# Patient Record
Sex: FEMALE | Race: WHITE | Employment: UNEMPLOYED | URBAN - METROPOLITAN AREA
[De-identification: names, ages, dates, MRNs, and addresses within clinical notes are randomized per-mention and may not be internally consistent; named-entity substitution may affect disease eponyms.]

---

## 2017-08-08 ENCOUNTER — HOSPITAL ENCOUNTER (EMERGENCY)
Age: 47
Discharge: HOME | End: 2017-08-08
Attending: EMERGENCY MEDICINE
Payer: COMMERCIAL

## 2017-08-08 VITALS
WEIGHT: 187 LBS | HEIGHT: 63 IN | RESPIRATION RATE: 18 BRPM | OXYGEN SATURATION: 99 % | SYSTOLIC BLOOD PRESSURE: 111 MMHG | DIASTOLIC BLOOD PRESSURE: 72 MMHG | BODY MASS INDEX: 33.13 KG/M2 | HEART RATE: 72 BPM | TEMPERATURE: 98.2 F

## 2017-08-08 DIAGNOSIS — K52.9 GASTROENTERITIS: Primary | ICD-10-CM

## 2017-08-08 LAB
ABSOLUTE EOS #: 0.1 K/UL (ref 0–0.4)
ABSOLUTE LYMPH #: 2.4 K/UL (ref 1–4.8)
ABSOLUTE MONO #: 0.8 K/UL (ref 0.1–1.3)
ALBUMIN SERPL-MCNC: 4.2 G/DL (ref 3.5–5.2)
ALBUMIN/GLOBULIN RATIO: ABNORMAL (ref 1–2.5)
ALP BLD-CCNC: 63 U/L (ref 35–104)
ALT SERPL-CCNC: 17 U/L (ref 5–33)
ANION GAP SERPL CALCULATED.3IONS-SCNC: 14 MMOL/L (ref 9–17)
AST SERPL-CCNC: 17 U/L
BASOPHILS # BLD: 0 %
BASOPHILS ABSOLUTE: 0 K/UL (ref 0–0.2)
BILIRUB SERPL-MCNC: 0.28 MG/DL (ref 0.3–1.2)
BILIRUBIN DIRECT: <0.08 MG/DL
BILIRUBIN, INDIRECT: ABNORMAL MG/DL (ref 0–1)
BUN BLDV-MCNC: 13 MG/DL (ref 6–20)
BUN/CREAT BLD: NORMAL (ref 9–20)
CALCIUM SERPL-MCNC: 8.6 MG/DL (ref 8.6–10.4)
CHLORIDE BLD-SCNC: 100 MMOL/L (ref 98–107)
CO2: 23 MMOL/L (ref 20–31)
CREAT SERPL-MCNC: 0.75 MG/DL (ref 0.5–0.9)
DIFFERENTIAL TYPE: NORMAL
EOSINOPHILS RELATIVE PERCENT: 2 %
GFR AFRICAN AMERICAN: >60 ML/MIN
GFR NON-AFRICAN AMERICAN: >60 ML/MIN
GFR SERPL CREATININE-BSD FRML MDRD: NORMAL ML/MIN/{1.73_M2}
GFR SERPL CREATININE-BSD FRML MDRD: NORMAL ML/MIN/{1.73_M2}
GLOBULIN: ABNORMAL G/DL (ref 1.5–3.8)
GLUCOSE BLD-MCNC: 93 MG/DL (ref 70–99)
HCT VFR BLD CALC: 38.3 % (ref 36–46)
HEMOGLOBIN: 13 G/DL (ref 12–16)
LACTIC ACID: 0.6 MMOL/L (ref 0.5–2.2)
LIPASE: 45 U/L (ref 13–60)
LYMPHOCYTES # BLD: 31 %
MCH RBC QN AUTO: 30.6 PG (ref 26–34)
MCHC RBC AUTO-ENTMCNC: 33.9 G/DL (ref 31–37)
MCV RBC AUTO: 90.3 FL (ref 80–100)
MONOCYTES # BLD: 11 %
PDW BLD-RTO: 12.4 % (ref 11.5–14.9)
PLATELET # BLD: 192 K/UL (ref 150–450)
PLATELET ESTIMATE: NORMAL
PMV BLD AUTO: 9.5 FL (ref 6–12)
POTASSIUM SERPL-SCNC: 4 MMOL/L (ref 3.7–5.3)
RBC # BLD: 4.24 M/UL (ref 4–5.2)
RBC # BLD: NORMAL 10*6/UL
SEG NEUTROPHILS: 56 %
SEGMENTED NEUTROPHILS ABSOLUTE COUNT: 4.4 K/UL (ref 1.3–9.1)
SODIUM BLD-SCNC: 137 MMOL/L (ref 135–144)
TOTAL PROTEIN: 7 G/DL (ref 6.4–8.3)
WBC # BLD: 7.8 K/UL (ref 3.5–11)
WBC # BLD: NORMAL 10*3/UL

## 2017-08-08 PROCEDURE — 85025 COMPLETE CBC W/AUTO DIFF WBC: CPT

## 2017-08-08 PROCEDURE — 2580000003 HC RX 258: Performed by: EMERGENCY MEDICINE

## 2017-08-08 PROCEDURE — 96374 THER/PROPH/DIAG INJ IV PUSH: CPT

## 2017-08-08 PROCEDURE — 80048 BASIC METABOLIC PNL TOTAL CA: CPT

## 2017-08-08 PROCEDURE — 83605 ASSAY OF LACTIC ACID: CPT

## 2017-08-08 PROCEDURE — 99284 EMERGENCY DEPT VISIT MOD MDM: CPT

## 2017-08-08 PROCEDURE — 83690 ASSAY OF LIPASE: CPT

## 2017-08-08 PROCEDURE — 6360000002 HC RX W HCPCS: Performed by: EMERGENCY MEDICINE

## 2017-08-08 PROCEDURE — 36415 COLL VENOUS BLD VENIPUNCTURE: CPT

## 2017-08-08 PROCEDURE — 80076 HEPATIC FUNCTION PANEL: CPT

## 2017-08-08 RX ORDER — ONDANSETRON 2 MG/ML
4 INJECTION INTRAMUSCULAR; INTRAVENOUS ONCE
Status: COMPLETED | OUTPATIENT
Start: 2017-08-08 | End: 2017-08-08

## 2017-08-08 RX ORDER — DICYCLOMINE HYDROCHLORIDE 10 MG/1
10 CAPSULE ORAL EVERY 6 HOURS PRN
Qty: 20 CAPSULE | Refills: 0 | Status: SHIPPED | OUTPATIENT
Start: 2017-08-08 | End: 2017-09-24

## 2017-08-08 RX ORDER — METRONIDAZOLE 500 MG/1
500 TABLET ORAL 3 TIMES DAILY
Qty: 14 TABLET | Refills: 0 | Status: SHIPPED | OUTPATIENT
Start: 2017-08-08 | End: 2017-08-15

## 2017-08-08 RX ORDER — CIPROFLOXACIN 500 MG/1
500 TABLET, FILM COATED ORAL 2 TIMES DAILY
Qty: 14 TABLET | Refills: 0 | Status: SHIPPED | OUTPATIENT
Start: 2017-08-08 | End: 2017-08-15

## 2017-08-08 RX ORDER — ONDANSETRON HYDROCHLORIDE 8 MG/1
8 TABLET, FILM COATED ORAL EVERY 8 HOURS PRN
Qty: 20 TABLET | Refills: 0 | Status: SHIPPED | OUTPATIENT
Start: 2017-08-08 | End: 2017-09-24

## 2017-08-08 RX ORDER — 0.9 % SODIUM CHLORIDE 0.9 %
1000 INTRAVENOUS SOLUTION INTRAVENOUS ONCE
Status: COMPLETED | OUTPATIENT
Start: 2017-08-08 | End: 2017-08-08

## 2017-08-08 RX ADMIN — ONDANSETRON 4 MG: 2 INJECTION INTRAMUSCULAR; INTRAVENOUS at 21:39

## 2017-08-08 RX ADMIN — SODIUM CHLORIDE 1000 ML: 9 INJECTION, SOLUTION INTRAVENOUS at 21:39

## 2017-08-08 ASSESSMENT — PAIN SCALES - GENERAL: PAINLEVEL_OUTOF10: 7

## 2017-08-08 ASSESSMENT — PAIN DESCRIPTION - LOCATION: LOCATION: ABDOMEN

## 2017-08-08 ASSESSMENT — PAIN DESCRIPTION - DESCRIPTORS: DESCRIPTORS: SHARP;CRAMPING

## 2017-08-08 ASSESSMENT — PAIN DESCRIPTION - PAIN TYPE: TYPE: ACUTE PAIN

## 2017-08-08 ASSESSMENT — PAIN DESCRIPTION - ORIENTATION: ORIENTATION: MID

## 2017-08-09 ASSESSMENT — ENCOUNTER SYMPTOMS
SHORTNESS OF BREATH: 0
ABDOMINAL PAIN: 1
COUGH: 0
DIARRHEA: 1
VOMITING: 1
NAUSEA: 1

## 2017-09-24 ENCOUNTER — HOSPITAL ENCOUNTER (EMERGENCY)
Age: 47
Discharge: HOME | End: 2017-09-24
Attending: EMERGENCY MEDICINE
Payer: COMMERCIAL

## 2017-09-24 ENCOUNTER — APPOINTMENT (OUTPATIENT)
Dept: GENERAL RADIOLOGY | Age: 47
End: 2017-09-24
Payer: COMMERCIAL

## 2017-09-24 VITALS
SYSTOLIC BLOOD PRESSURE: 102 MMHG | DIASTOLIC BLOOD PRESSURE: 51 MMHG | HEART RATE: 73 BPM | RESPIRATION RATE: 14 BRPM | WEIGHT: 180 LBS | TEMPERATURE: 98.1 F | BODY MASS INDEX: 31.89 KG/M2 | OXYGEN SATURATION: 96 %

## 2017-09-24 DIAGNOSIS — J40 BRONCHITIS: Primary | ICD-10-CM

## 2017-09-24 LAB
ABSOLUTE EOS #: 0.2 K/UL (ref 0–0.4)
ABSOLUTE LYMPH #: 1.6 K/UL (ref 1–4.8)
ABSOLUTE MONO #: 0.8 K/UL (ref 0.1–1.3)
ANION GAP SERPL CALCULATED.3IONS-SCNC: 15 MMOL/L (ref 9–17)
BASOPHILS # BLD: 1 %
BASOPHILS ABSOLUTE: 0.1 K/UL (ref 0–0.2)
BNP INTERPRETATION: NORMAL
BUN BLDV-MCNC: 5 MG/DL (ref 6–20)
BUN/CREAT BLD: ABNORMAL (ref 9–20)
CALCIUM SERPL-MCNC: 8.7 MG/DL (ref 8.6–10.4)
CHLORIDE BLD-SCNC: 103 MMOL/L (ref 98–107)
CO2: 21 MMOL/L (ref 20–31)
CREAT SERPL-MCNC: 0.66 MG/DL (ref 0.5–0.9)
DIFFERENTIAL TYPE: NORMAL
EOSINOPHILS RELATIVE PERCENT: 3 %
GFR AFRICAN AMERICAN: >60 ML/MIN
GFR NON-AFRICAN AMERICAN: >60 ML/MIN
GFR SERPL CREATININE-BSD FRML MDRD: ABNORMAL ML/MIN/{1.73_M2}
GFR SERPL CREATININE-BSD FRML MDRD: ABNORMAL ML/MIN/{1.73_M2}
GLUCOSE BLD-MCNC: 85 MG/DL (ref 70–99)
HCT VFR BLD CALC: 40.6 % (ref 36–46)
HEMOGLOBIN: 13.8 G/DL (ref 12–16)
LYMPHOCYTES # BLD: 23 %
MCH RBC QN AUTO: 31.3 PG (ref 26–34)
MCHC RBC AUTO-ENTMCNC: 34.1 G/DL (ref 31–37)
MCV RBC AUTO: 91.8 FL (ref 80–100)
MONOCYTES # BLD: 11 %
MYOGLOBIN: 34 NG/ML (ref 25–58)
PDW BLD-RTO: 12.9 % (ref 11.5–14.9)
PLATELET # BLD: 185 K/UL (ref 150–450)
PLATELET ESTIMATE: NORMAL
PMV BLD AUTO: 8.9 FL (ref 6–12)
POTASSIUM SERPL-SCNC: 3.9 MMOL/L (ref 3.7–5.3)
PRO-BNP: 102 PG/ML
RBC # BLD: 4.42 M/UL (ref 4–5.2)
RBC # BLD: NORMAL 10*6/UL
SEG NEUTROPHILS: 62 %
SEGMENTED NEUTROPHILS ABSOLUTE COUNT: 4.3 K/UL (ref 1.3–9.1)
SODIUM BLD-SCNC: 139 MMOL/L (ref 135–144)
TROPONIN INTERP: NORMAL
TROPONIN T: <0.03 NG/ML
WBC # BLD: 6.9 K/UL (ref 3.5–11)
WBC # BLD: NORMAL 10*3/UL

## 2017-09-24 PROCEDURE — 71020 XR CHEST STANDARD TWO VW: CPT

## 2017-09-24 PROCEDURE — 36415 COLL VENOUS BLD VENIPUNCTURE: CPT

## 2017-09-24 PROCEDURE — 85025 COMPLETE CBC W/AUTO DIFF WBC: CPT

## 2017-09-24 PROCEDURE — 2580000003 HC RX 258: Performed by: EMERGENCY MEDICINE

## 2017-09-24 PROCEDURE — 99285 EMERGENCY DEPT VISIT HI MDM: CPT

## 2017-09-24 PROCEDURE — 96360 HYDRATION IV INFUSION INIT: CPT

## 2017-09-24 PROCEDURE — 93005 ELECTROCARDIOGRAM TRACING: CPT

## 2017-09-24 PROCEDURE — 6370000000 HC RX 637 (ALT 250 FOR IP): Performed by: EMERGENCY MEDICINE

## 2017-09-24 PROCEDURE — 80048 BASIC METABOLIC PNL TOTAL CA: CPT

## 2017-09-24 PROCEDURE — 96361 HYDRATE IV INFUSION ADD-ON: CPT

## 2017-09-24 PROCEDURE — 83874 ASSAY OF MYOGLOBIN: CPT

## 2017-09-24 PROCEDURE — 84484 ASSAY OF TROPONIN QUANT: CPT

## 2017-09-24 PROCEDURE — 83880 ASSAY OF NATRIURETIC PEPTIDE: CPT

## 2017-09-24 RX ORDER — BENZONATATE 100 MG/1
100 CAPSULE ORAL 3 TIMES DAILY PRN
Qty: 30 CAPSULE | Refills: 0 | Status: SHIPPED | OUTPATIENT
Start: 2017-09-24 | End: 2018-11-07 | Stop reason: ALTCHOICE

## 2017-09-24 RX ORDER — 0.9 % SODIUM CHLORIDE 0.9 %
500 INTRAVENOUS SOLUTION INTRAVENOUS ONCE
Status: COMPLETED | OUTPATIENT
Start: 2017-09-24 | End: 2017-09-24

## 2017-09-24 RX ORDER — BENZONATATE 100 MG/1
100 CAPSULE ORAL ONCE
Status: COMPLETED | OUTPATIENT
Start: 2017-09-24 | End: 2017-09-24

## 2017-09-24 RX ORDER — OXYMETAZOLINE HYDROCHLORIDE 0.05 G/100ML
2 SPRAY NASAL 2 TIMES DAILY
Qty: 1 BOTTLE | Refills: 0 | Status: SHIPPED | OUTPATIENT
Start: 2017-09-24 | End: 2017-09-27

## 2017-09-24 RX ADMIN — BENZONATATE 100 MG: 100 CAPSULE ORAL at 13:34

## 2017-09-24 RX ADMIN — SODIUM CHLORIDE 500 ML: 9 INJECTION, SOLUTION INTRAVENOUS at 13:39

## 2017-09-24 ASSESSMENT — ENCOUNTER SYMPTOMS
COLOR CHANGE: 0
SHORTNESS OF BREATH: 1
RHINORRHEA: 1
CONSTIPATION: 0
ABDOMINAL PAIN: 1
EYE REDNESS: 0
VOMITING: 0
EYE DISCHARGE: 0
CHEST TIGHTNESS: 1
BACK PAIN: 0
BLOOD IN STOOL: 0
SINUS PRESSURE: 0
NAUSEA: 0
COUGH: 1
DIARRHEA: 0
EYE PAIN: 0
WHEEZING: 0
FACIAL SWELLING: 0
SORE THROAT: 0
TROUBLE SWALLOWING: 0

## 2017-09-24 ASSESSMENT — PAIN SCALES - GENERAL: PAINLEVEL_OUTOF10: 8

## 2017-09-24 ASSESSMENT — PAIN DESCRIPTION - PAIN TYPE: TYPE: ACUTE PAIN

## 2017-09-24 ASSESSMENT — PAIN DESCRIPTION - LOCATION: LOCATION: CHEST

## 2017-09-24 ASSESSMENT — PAIN SCALES - WONG BAKER: WONGBAKER_NUMERICALRESPONSE: 8

## 2017-09-25 LAB
EKG ATRIAL RATE: 72 BPM
EKG P AXIS: 66 DEGREES
EKG P-R INTERVAL: 114 MS
EKG Q-T INTERVAL: 368 MS
EKG QRS DURATION: 90 MS
EKG QTC CALCULATION (BAZETT): 402 MS
EKG R AXIS: 79 DEGREES
EKG T AXIS: 49 DEGREES
EKG VENTRICULAR RATE: 72 BPM

## 2018-11-07 ENCOUNTER — OFFICE VISIT (OUTPATIENT)
Dept: FAMILY MEDICINE CLINIC | Age: 48
End: 2018-11-07
Payer: COMMERCIAL

## 2018-11-07 ENCOUNTER — HOSPITAL ENCOUNTER (OUTPATIENT)
Age: 48
Setting detail: SPECIMEN
Discharge: HOME | End: 2018-11-07
Payer: COMMERCIAL

## 2018-11-07 VITALS
OXYGEN SATURATION: 97 % | SYSTOLIC BLOOD PRESSURE: 114 MMHG | BODY MASS INDEX: 33.17 KG/M2 | DIASTOLIC BLOOD PRESSURE: 68 MMHG | HEIGHT: 63 IN | HEART RATE: 73 BPM | WEIGHT: 187.2 LBS

## 2018-11-07 DIAGNOSIS — R51.9 CHRONIC NONINTRACTABLE HEADACHE, UNSPECIFIED HEADACHE TYPE: ICD-10-CM

## 2018-11-07 DIAGNOSIS — R35.0 FREQUENT URINATION: ICD-10-CM

## 2018-11-07 DIAGNOSIS — F41.9 ANXIETY: ICD-10-CM

## 2018-11-07 DIAGNOSIS — G37.9 DEMYELINATING DISEASE OF CENTRAL NERVOUS SYSTEM (HCC): Primary | ICD-10-CM

## 2018-11-07 DIAGNOSIS — Z23 NEED FOR INFLUENZA VACCINATION: ICD-10-CM

## 2018-11-07 DIAGNOSIS — Z00.00 PREVENTATIVE HEALTH CARE: ICD-10-CM

## 2018-11-07 DIAGNOSIS — R82.90 ABNORMAL URINALYSIS: ICD-10-CM

## 2018-11-07 DIAGNOSIS — Z83.3 FAMILY HISTORY OF DIABETES MELLITUS: ICD-10-CM

## 2018-11-07 DIAGNOSIS — G89.29 CHRONIC NONINTRACTABLE HEADACHE, UNSPECIFIED HEADACHE TYPE: ICD-10-CM

## 2018-11-07 DIAGNOSIS — R31.29 OTHER MICROSCOPIC HEMATURIA: ICD-10-CM

## 2018-11-07 DIAGNOSIS — D50.9 IRON DEFICIENCY ANEMIA, UNSPECIFIED IRON DEFICIENCY ANEMIA TYPE: ICD-10-CM

## 2018-11-07 DIAGNOSIS — E55.9 VITAMIN D DEFICIENCY: ICD-10-CM

## 2018-11-07 DIAGNOSIS — Z11.4 ENCOUNTER FOR SCREENING FOR HIV: ICD-10-CM

## 2018-11-07 PROBLEM — D64.9 ANEMIA: Status: ACTIVE | Noted: 2018-11-07

## 2018-11-07 LAB
BILIRUBIN, POC: NEGATIVE
BLOOD URINE, POC: NORMAL
CLARITY, POC: CLEAR
COLOR, POC: NORMAL
GLUCOSE URINE, POC: NEGATIVE
KETONES, POC: NEGATIVE
LEUKOCYTE EST, POC: NEGATIVE
NITRITE, POC: NEGATIVE
PH, POC: 7
PROTEIN, POC: NEGATIVE
SPECIFIC GRAVITY, POC: >1.03
UROBILINOGEN, POC: 0.2

## 2018-11-07 PROCEDURE — G8427 DOCREV CUR MEDS BY ELIG CLIN: HCPCS | Performed by: NURSE PRACTITIONER

## 2018-11-07 PROCEDURE — 90471 IMMUNIZATION ADMIN: CPT | Performed by: NURSE PRACTITIONER

## 2018-11-07 PROCEDURE — 96160 PT-FOCUSED HLTH RISK ASSMT: CPT | Performed by: NURSE PRACTITIONER

## 2018-11-07 PROCEDURE — 81003 URINALYSIS AUTO W/O SCOPE: CPT | Performed by: NURSE PRACTITIONER

## 2018-11-07 PROCEDURE — 90686 IIV4 VACC NO PRSV 0.5 ML IM: CPT | Performed by: NURSE PRACTITIONER

## 2018-11-07 PROCEDURE — 99215 OFFICE O/P EST HI 40 MIN: CPT | Performed by: NURSE PRACTITIONER

## 2018-11-07 PROCEDURE — G8482 FLU IMMUNIZE ORDER/ADMIN: HCPCS | Performed by: NURSE PRACTITIONER

## 2018-11-07 PROCEDURE — 1036F TOBACCO NON-USER: CPT | Performed by: NURSE PRACTITIONER

## 2018-11-07 PROCEDURE — G8417 CALC BMI ABV UP PARAM F/U: HCPCS | Performed by: NURSE PRACTITIONER

## 2018-11-07 RX ORDER — LANOLIN ALCOHOL/MO/W.PET/CERES
325 CREAM (GRAM) TOPICAL
Qty: 30 TABLET | Refills: 5 | Status: SHIPPED | OUTPATIENT
Start: 2018-11-07 | End: 2019-12-17 | Stop reason: SDUPTHER

## 2018-11-07 RX ORDER — BUSPIRONE HYDROCHLORIDE 7.5 MG/1
7.5 TABLET ORAL 3 TIMES DAILY PRN
Qty: 90 TABLET | Refills: 0 | Status: SHIPPED | OUTPATIENT
Start: 2018-11-07 | End: 2019-12-17 | Stop reason: SDUPTHER

## 2018-11-07 ASSESSMENT — ENCOUNTER SYMPTOMS
SHORTNESS OF BREATH: 0
EYE DISCHARGE: 0
COUGH: 0
ABDOMINAL PAIN: 0
BLOOD IN STOOL: 0

## 2018-11-07 ASSESSMENT — PATIENT HEALTH QUESTIONNAIRE - PHQ9
6. FEELING BAD ABOUT YOURSELF - OR THAT YOU ARE A FAILURE OR HAVE LET YOURSELF OR YOUR FAMILY DOWN: 0
8. MOVING OR SPEAKING SO SLOWLY THAT OTHER PEOPLE COULD HAVE NOTICED. OR THE OPPOSITE, BEING SO FIGETY OR RESTLESS THAT YOU HAVE BEEN MOVING AROUND A LOT MORE THAN USUAL: 0
4. FEELING TIRED OR HAVING LITTLE ENERGY: 3
3. TROUBLE FALLING OR STAYING ASLEEP: 3
SUM OF ALL RESPONSES TO PHQ9 QUESTIONS 1 & 2: 6
2. FEELING DOWN, DEPRESSED OR HOPELESS: 3
9. THOUGHTS THAT YOU WOULD BE BETTER OFF DEAD, OR OF HURTING YOURSELF: 0
7. TROUBLE CONCENTRATING ON THINGS, SUCH AS READING THE NEWSPAPER OR WATCHING TELEVISION: 3
5. POOR APPETITE OR OVEREATING: 0
SUM OF ALL RESPONSES TO PHQ QUESTIONS 1-9: 15
1. LITTLE INTEREST OR PLEASURE IN DOING THINGS: 3
SUM OF ALL RESPONSES TO PHQ QUESTIONS 1-9: 15
10. IF YOU CHECKED OFF ANY PROBLEMS, HOW DIFFICULT HAVE THESE PROBLEMS MADE IT FOR YOU TO DO YOUR WORK, TAKE CARE OF THINGS AT HOME, OR GET ALONG WITH OTHER PEOPLE: 3

## 2018-11-07 ASSESSMENT — ANXIETY QUESTIONNAIRES
4. TROUBLE RELAXING: 3-NEARLY EVERY DAY
7. FEELING AFRAID AS IF SOMETHING AWFUL MIGHT HAPPEN: 3-NEARLY EVERY DAY
3. WORRYING TOO MUCH ABOUT DIFFERENT THINGS: 3-NEARLY EVERY DAY
6. BECOMING EASILY ANNOYED OR IRRITABLE: 3-NEARLY EVERY DAY
5. BEING SO RESTLESS THAT IT IS HARD TO SIT STILL: 1-SEVERAL DAYS
2. NOT BEING ABLE TO STOP OR CONTROL WORRYING: 3-NEARLY EVERY DAY
GAD7 TOTAL SCORE: 19
1. FEELING NERVOUS, ANXIOUS, OR ON EDGE: 3-NEARLY EVERY DAY

## 2018-11-07 NOTE — PROGRESS NOTES
mouth 3 times daily as needed (anxiety) 90 tablet 0    ferrous sulfate 325 (65 Fe) MG EC tablet Take 1 tablet by mouth daily (with breakfast) 30 tablet 5     No current facility-administered medications for this visit. Allergies   Allergen Reactions    Effexor [Venlafaxine Hcl]     Morphine     Pcn [Penicillins]          Subjective:      Review of Systems   Constitutional: Positive for fatigue. Negative for activity change, chills and fever. Eyes: Negative for discharge and visual disturbance. Respiratory: Negative for cough and shortness of breath. Cardiovascular: Negative for chest pain and leg swelling. Gastrointestinal: Negative for abdominal pain and blood in stool. Endocrine: Negative for cold intolerance and heat intolerance. Genitourinary: Positive for frequency. Negative for dysuria and flank pain. Musculoskeletal: Negative for joint swelling and myalgias. Skin: Negative for pallor and rash.        woiunds of face from anxiety scratching    Neurological: Positive for tremors (at night rarely ,when tired ) and headaches. Negative for dizziness. Psychiatric/Behavioral: Negative for hallucinations and suicidal ideas. The patient is nervous/anxious. Objective:     Physical Exam   Constitutional: She is oriented to person, place, and time. She appears well-developed and well-nourished. /68   Pulse 73   Ht 5' 3\" (1.6 m)   Wt 187 lb 3.2 oz (84.9 kg)   SpO2 97%   BMI 33.16 kg/m²      HENT:   Head: Normocephalic and atraumatic. Eyes: Conjunctivae and EOM are normal. No scleral icterus. Neck: Neck supple. Carotid bruit is not present. Cardiovascular: Normal rate, regular rhythm, normal heart sounds and intact distal pulses. Pulmonary/Chest: Effort normal and breath sounds normal. She has no wheezes. She has no rales. Abdominal: Soft. Bowel sounds are normal.   Musculoskeletal: Normal range of motion. She exhibits no edema.    Neurological: She is alert and oriented to person, place, and time. No essential tremors noted today on exam   Skin: Skin is warm and dry. Abrasions , superficial on  face from anxiety scratching . On chin and perioral area    Psychiatric: She has a normal mood and affect. Nursing note and vitals reviewed. /68   Pulse 73   Ht 5' 3\" (1.6 m)   Wt 187 lb 3.2 oz (84.9 kg)   SpO2 97%   BMI 33.16 kg/m²     CBC:   Lab Results   Component Value Date    WBC 6.9 09/24/2017    RBC 4.42 09/24/2017    HGB 13.8 09/24/2017    HCT 40.6 09/24/2017    MCV 91.8 09/24/2017    MCH 31.3 09/24/2017    MCHC 34.1 09/24/2017    RDW 12.9 09/24/2017     09/24/2017    MPV 8.9 09/24/2017     CMP:    Lab Results   Component Value Date     09/24/2017    K 3.9 09/24/2017     09/24/2017    CO2 21 09/24/2017    BUN 5 09/24/2017    CREATININE 0.66 09/24/2017    GFRAA >60 09/24/2017    LABGLOM >60 09/24/2017    GLUCOSE 85 09/24/2017    PROT 7.0 08/08/2017    LABALBU 4.2 08/08/2017    CALCIUM 8.7 09/24/2017    BILITOT 0.28 08/08/2017    ALKPHOS 63 08/08/2017    AST 17 08/08/2017    ALT 17 08/08/2017     No results found for: LABA1C  No radiation information found for this patient   Narrative   MRI brain with and without intravenous contrast, 7/3/2015       History: Demyelinating disease of the central nervous system.       Technique: Multiplanar multisequence MR imaging of the brain was performed before and after intravenous demonstration of 16 cc of Magnevist.       Findings: No evidence for shift of midline structures, mass effect or compression of the ventricles noted.       No acute intra-or extra-axial hemorrhage is seen. No abnormal intracranial fluid collections are identified.       Basal cisterns are patent.  Posterior fossa structures demonstrate no discrete mass.       Scattered foci of T2/FLAIR hyperintensity noted in the periventricular and subcortical white matter which essentially a nonspecific in imaging appearance however 5     Sig: Take 1 tablet by mouth daily (with breakfast)       All patient questions answered. Patient voiced understanding. Quality Measures    Body mass index is 33.16 kg/m². Elevated. Weight control planned discussed Healthy diet and regular exercise. BP: 114/68 Blood pressure is normal. Treatment plan consists of No treatment change needed. No results found for: LDLCALC, LDLCHOLESTEROL, LDLDIRECT (goal LDL reduction with dx if diabetes is 50% LDL reduction)      PHQ Scores 11/7/2018 1/27/2015   PHQ2 Score 6 6   PHQ9 Score 15 19     Interpretation of Total Score Depression Severity: 1-4 = Minimal depression, 5-9 = Mild depression, 10-14 = Moderate depression, 15-19 = Moderately severe depression, 20-27 = Severe depression    Return for and oralee. Orders Placed This Encounter   Procedures    Urine Culture     Order Specific Question:   Specify (ex-cath, midstream, cysto, etc)?      Answer:   midstream    INFLUENZA, QUADV, 3 YRS AND OLDER, IM, PF, PREFILL SYR OR SDV, 0.5ML (FLUZONE QUADV, PF)    HIV Screen     Standing Status:   Future     Standing Expiration Date:   11/7/2019    Lipid, Fasting     Standing Status:   Future     Standing Expiration Date:   11/7/2019    Vitamin D 25 Hydroxy     Standing Status:   Future     Standing Expiration Date:   11/7/2019    CBC Auto Differential     Standing Status:   Future     Standing Expiration Date:   11/7/2019    Hemoglobin A1C     Standing Status:   Future     Standing Expiration Date:   11/7/2019   Amado Angelucci, MD, [facility] [place]     Referral Priority:   Routine     Referral Type:   Consult for Advice and Opinion     Referral Reason:   Specialty Services Required     Referred to Provider:   Jose Martin MD     Requested Specialty:   Neurology     Number of Visits Requested:   Freda Heard MD, [facility] [place]*     Referral Priority:   Routine     Referral Type:   Consult for Advice and Opinion     Referral Reason:

## 2018-11-08 LAB
CULTURE: NORMAL
Lab: NORMAL
SPECIMEN DESCRIPTION: NORMAL
STATUS: NORMAL

## 2019-04-08 ENCOUNTER — HOSPITAL ENCOUNTER (OUTPATIENT)
Age: 49
Discharge: HOME | End: 2019-04-08
Payer: COMMERCIAL

## 2019-04-08 DIAGNOSIS — D50.9 IRON DEFICIENCY ANEMIA, UNSPECIFIED IRON DEFICIENCY ANEMIA TYPE: ICD-10-CM

## 2019-04-08 DIAGNOSIS — Z11.4 ENCOUNTER FOR SCREENING FOR HIV: ICD-10-CM

## 2019-04-08 DIAGNOSIS — E55.9 VITAMIN D DEFICIENCY: ICD-10-CM

## 2019-04-08 DIAGNOSIS — Z83.3 FAMILY HISTORY OF DIABETES MELLITUS: ICD-10-CM

## 2019-04-08 LAB
ABSOLUTE EOS #: 0.1 K/UL (ref 0–0.4)
ABSOLUTE IMMATURE GRANULOCYTE: ABNORMAL K/UL (ref 0–0.3)
ABSOLUTE LYMPH #: 2.5 K/UL (ref 1–4.8)
ABSOLUTE MONO #: 0.6 K/UL (ref 0.1–1.3)
BASOPHILS # BLD: 1 % (ref 0–2)
BASOPHILS ABSOLUTE: 0 K/UL (ref 0–0.2)
DIFFERENTIAL TYPE: ABNORMAL
EOSINOPHILS RELATIVE PERCENT: 2 % (ref 0–4)
ESTIMATED AVERAGE GLUCOSE: 114 MG/DL
HBA1C MFR BLD: 5.6 % (ref 4–6)
HCT VFR BLD CALC: 42 % (ref 36–46)
HEMOGLOBIN: 13.9 G/DL (ref 12–16)
HIV AG/AB: NONREACTIVE
IMMATURE GRANULOCYTES: ABNORMAL %
LYMPHOCYTES # BLD: 34 % (ref 24–44)
MCH RBC QN AUTO: 29.8 PG (ref 26–34)
MCHC RBC AUTO-ENTMCNC: 33.1 G/DL (ref 31–37)
MCV RBC AUTO: 90 FL (ref 80–100)
MONOCYTES # BLD: 8 % (ref 1–7)
NRBC AUTOMATED: ABNORMAL PER 100 WBC
PDW BLD-RTO: 12.8 % (ref 11.5–14.9)
PLATELET # BLD: 220 K/UL (ref 150–450)
PLATELET ESTIMATE: ABNORMAL
PMV BLD AUTO: 9.3 FL (ref 6–12)
RBC # BLD: 4.66 M/UL (ref 4–5.2)
RBC # BLD: ABNORMAL 10*6/UL
SEG NEUTROPHILS: 55 % (ref 36–66)
SEGMENTED NEUTROPHILS ABSOLUTE COUNT: 4.2 K/UL (ref 1.3–9.1)
VITAMIN D 25-HYDROXY: 22 NG/ML (ref 30–100)
WBC # BLD: 7.5 K/UL (ref 3.5–11)
WBC # BLD: ABNORMAL 10*3/UL

## 2019-04-08 PROCEDURE — 87389 HIV-1 AG W/HIV-1&-2 AB AG IA: CPT

## 2019-04-08 PROCEDURE — 82306 VITAMIN D 25 HYDROXY: CPT

## 2019-04-08 PROCEDURE — 83036 HEMOGLOBIN GLYCOSYLATED A1C: CPT

## 2019-04-08 PROCEDURE — 87086 URINE CULTURE/COLONY COUNT: CPT

## 2019-04-08 PROCEDURE — 85025 COMPLETE CBC W/AUTO DIFF WBC: CPT

## 2019-04-08 PROCEDURE — 36415 COLL VENOUS BLD VENIPUNCTURE: CPT

## 2019-04-09 ENCOUNTER — HOSPITAL ENCOUNTER (OUTPATIENT)
Age: 49
Discharge: HOME | End: 2019-04-09
Payer: COMMERCIAL

## 2019-04-09 DIAGNOSIS — Z00.00 PREVENTATIVE HEALTH CARE: ICD-10-CM

## 2019-04-09 LAB
CHOLESTEROL, FASTING: 198 MG/DL
CHOLESTEROL/HDL RATIO: 4
CULTURE: NORMAL
HDLC SERPL-MCNC: 50 MG/DL
LDL CHOLESTEROL: 125 MG/DL (ref 0–130)
Lab: NORMAL
SPECIMEN DESCRIPTION: NORMAL
TRIGLYCERIDE, FASTING: 114 MG/DL
VLDLC SERPL CALC-MCNC: NORMAL MG/DL (ref 1–30)

## 2019-04-09 PROCEDURE — 80061 LIPID PANEL: CPT

## 2019-04-09 PROCEDURE — 36415 COLL VENOUS BLD VENIPUNCTURE: CPT

## 2019-04-10 DIAGNOSIS — E55.9 VITAMIN D DEFICIENCY: Primary | ICD-10-CM

## 2019-04-10 RX ORDER — ERGOCALCIFEROL 1.25 MG/1
50000 CAPSULE ORAL WEEKLY
Qty: 12 CAPSULE | Refills: 0 | Status: SHIPPED | OUTPATIENT
Start: 2019-04-10 | End: 2020-09-15 | Stop reason: SDUPTHER

## 2019-04-18 ENCOUNTER — HOSPITAL ENCOUNTER (OUTPATIENT)
Age: 49
Setting detail: SPECIMEN
Discharge: HOME | End: 2019-04-18
Payer: COMMERCIAL

## 2019-04-18 ENCOUNTER — OFFICE VISIT (OUTPATIENT)
Dept: NEUROLOGY | Age: 49
End: 2019-04-18
Payer: COMMERCIAL

## 2019-04-18 VITALS
DIASTOLIC BLOOD PRESSURE: 78 MMHG | BODY MASS INDEX: 34.69 KG/M2 | WEIGHT: 195.8 LBS | SYSTOLIC BLOOD PRESSURE: 113 MMHG | HEIGHT: 63 IN | HEART RATE: 66 BPM

## 2019-04-18 DIAGNOSIS — G44.221 CHRONIC TENSION-TYPE HEADACHE, INTRACTABLE: ICD-10-CM

## 2019-04-18 DIAGNOSIS — R26.9 GAIT DIFFICULTY: ICD-10-CM

## 2019-04-18 DIAGNOSIS — G95.9 MYELOPATHY (HCC): ICD-10-CM

## 2019-04-18 DIAGNOSIS — R42 DIZZINESS: ICD-10-CM

## 2019-04-18 DIAGNOSIS — G95.9 MYELOPATHY (HCC): Primary | ICD-10-CM

## 2019-04-18 LAB
ANION GAP SERPL CALCULATED.3IONS-SCNC: 12 MMOL/L (ref 9–17)
BUN BLDV-MCNC: 9 MG/DL (ref 6–20)
BUN/CREAT BLD: ABNORMAL (ref 9–20)
CALCIUM SERPL-MCNC: 9.2 MG/DL (ref 8.6–10.4)
CHLORIDE BLD-SCNC: 108 MMOL/L (ref 98–107)
CO2: 24 MMOL/L (ref 20–31)
CREAT SERPL-MCNC: 0.87 MG/DL (ref 0.5–0.9)
GFR AFRICAN AMERICAN: >60 ML/MIN
GFR NON-AFRICAN AMERICAN: >60 ML/MIN
GFR SERPL CREATININE-BSD FRML MDRD: ABNORMAL ML/MIN/{1.73_M2}
GFR SERPL CREATININE-BSD FRML MDRD: ABNORMAL ML/MIN/{1.73_M2}
GLUCOSE BLD-MCNC: 96 MG/DL (ref 70–99)
POTASSIUM SERPL-SCNC: 4.2 MMOL/L (ref 3.7–5.3)
SODIUM BLD-SCNC: 144 MMOL/L (ref 135–144)

## 2019-04-18 PROCEDURE — 1036F TOBACCO NON-USER: CPT | Performed by: PSYCHIATRY & NEUROLOGY

## 2019-04-18 PROCEDURE — G8417 CALC BMI ABV UP PARAM F/U: HCPCS | Performed by: PSYCHIATRY & NEUROLOGY

## 2019-04-18 PROCEDURE — G8427 DOCREV CUR MEDS BY ELIG CLIN: HCPCS | Performed by: PSYCHIATRY & NEUROLOGY

## 2019-04-18 PROCEDURE — 99204 OFFICE O/P NEW MOD 45 MIN: CPT | Performed by: PSYCHIATRY & NEUROLOGY

## 2019-04-18 NOTE — PROGRESS NOTES
NEUROLOGY CONSULT  Patient Name:       Kentrell Bhat  :        1970  Clinic Visit Date:    2019    Dear Dr. Cortez Maradiaga MD     I had the opportunity to see your patient, Ms. Kentrell Bhat in neurology consultation today. As you know she  is a 50 y.o. right handed  female presented after prolonged absence of 4 year duration. She comes to the clinic stating that she went to [facility] in the past and they did not repeat MRI brain nor spinal fluid studies but they told her that she needs to be followed up for abnormal white matter changes but they started treating her with Botox injections. She stopped going as Botox injections made her to have facial twitching from Botox therapy. She was never given any diagnosis but she was told that she needs to be followed up for multiple sclerosis. She comes to the clinic stating that she has been having gait difficulties and tremors or shaking at night occurring every night affecting her sleep and weakness of lower extremities and right wrist is acting up and headaches never got better even after having had botox injection therapy. She does not want to go back to headache clinic and also does not want to have botox injection therapy. She also has been having constant aching pain \" feeling of getting big and about to explode\" lasting for several hours of the day and occur almost everyday; pain at level 7/10 severity. She has photophobia but denied phonophobia. She does have nausea all the time and denied vomiting. She was on Zofran in the past with help. She does have dizziness with lightheadedness and occasional room spinning sensations. She has been taking ibuprofen on frequent basis. She has taken Percocet and Vicodin but she is aware of addiction potential.   She has been having increasing gait difficulties and tripping on her own feet. She is very concerned about these problems and not able to function properly.   She has been age 48     Blood pressure 113/78, pulse 66, height 5' 3\" (1.6 m), weight 195 lb 12.8 oz (88.8 kg). GENERAL  Appears uncomfortable and exhausted due to lack of sleep but in no acute cardioresp distress   HEENT  NC/ AT   NECK  Supple and no bruits heard   MENTAL STATUS:  Alert, oriented, intact memory, no confusion, normal speech, normal language, no hallucination or delusion   CRANIAL NERVES: II     -      DEREK, Fundi reveal intact venous pulsations; Visual fields intact to confrontation  III,IV,VI -  EOMs full, no afferent defect, no                      MISBAH, no ptosis  V     -     Normal facial sensation  VII    -     Normal facial symmetry  VIII   -     Intact hearing  IX,X -     Symmetrical palate  XI    -     Symmetrical shoulder shrug  XII   -     Midline tongue, no atrophy    MOTOR FUNCTION:  significant for good strength of grade 5/5 in bilateral proximal and distal muscle groups of both upper and lower extremities with normal bulk, normal tone and no involuntary movements, no tremor   SENSORY FUNCTION:  Normal touch, normal pin, normal vibration, normal proprioception   CEREBELLAR FUNCTION:  Intact fine motor control over upper limbs   REFLEX FUNCTION:  Symmetric and brisk DTRs with equivocal plantars bilaterally   STATION and GAIT  Normal station, unable to walk on heels and toes and unable to do tandem gait           Diagnostic data reviewed with the patient:     MRI brain (w/wo) (11-3-2014): No acute or subacute ischemic insult seen. Multiple scattered white matter changes. MRI cervical spine (w/wo) (11-3-2014): with multilevel degenerative changes. There is no abnormal signal or enhancement within the cervical spine.      EEG (11-3-2014): normal.         Impression and Plan: Ms. Martín Benavides is a 50 y.o. female with   Chronic daily headaches; refractory to NSAIDs and failed Botox therapy  Progressive gait difficulties with dizziness and unsteadiness with features raising concern for myelopathy  Unprovoked do fatigue and increased affective disorder symptoms due to ongoing symptomatology  Hx of abnormal brain MRI with white matter changes    Will get MRI brain(w/wo) and MRI cervical spine (w/wo) in further evaluation  She has tried multiple medications and she is more concerned about diagnostic possibilities. Therefore will see her in the clinic once the above testing is done. Please note that portions of this note were completed with a voice recognition program.  Although every effort was made to ensure the accuracy of this  automated transcription, some errors in transcription may have occurred, occasionally words are mis-transcribed.

## 2019-04-18 NOTE — LETTER
[id number] [facility] Neurology Specialist  [address] [place] [id number]  Phone: [phone]  Fax: [phone]    Braden Leo MD        2019     MD Kaci Ricks [facility] [address]  [address] [facility] [address]    Patient: Hany Dixon  MR Number: [id number]  YOB: 1970  Date of Visit: 2019    Dear Dr. Greer Speak: Thank you for the request for consultation for Marylene Patient to me for the evaluation of Deena Evan  Below are the relevant portions of my assessment and plan of care. NEUROLOGY FOLLOW-UP  Patient Name:  Hany Dixon  :   1970  Clinic Visit Date: 2019        REVIEW OF SYSTEMS  Constitutional Weight changes: present, Fevers : absent, Fatigue: present; Any recent hospitalizations:  absent.    HEENT Ears: pain and ringing, Eyes: normal, Visual disturbance: present   Reespiratory Shortness of breath: present, Cough: absent   Cardivascular Chest pain: present, Leg swelling :absent   GI Constipation: absent, Diarrhea: absent, Swallowing change: absent    Urinary frequency: present, Urinary urgency: absent, Urinary incontinence: absent   Musculoskeletal Neck pain: present, Back pain: present, Stiffness: present, Muscle pain: present, Joint pain: present   Dermatological Hair loss: absent, Skin changes: absent   Neurological Memory loss: present, Confusion: present, Seizures: absent; Trouble walking or imbalance: present, Dizziness: present, Weakness: present, Numbness present, Tremor: present, Spasm: present, Speech difficulty: present, Headache: present, Light sensitivity: present   Psychiatric Anxiety: present, Depression  present, Suicidal ideations absent   Hematologic Abnormal bleeding: absent, Anemia: present, Clotting disorder: absent, Lymph gland changes: absent           NEUROLOGY CONSULT  Patient Name:       Hany Dixon  :        1970 Clinic Visit Date:    4/18/2019    Dear Dr. Yoon Sal:     I had the opportunity to see your patient, Ms. Jordyn Sarabia in neurology consultation today. As you know she  is a 50 y.o. right handed  female presented after prolonged absence of 4 year duration. She comes to the clinic stating that she went to [facility] in the past and they did not repeat MRI brain nor spinal fluid studies but they told her that she needs to be followed up for abnormal white matter changes but they started treating her with Botox injections. She stopped going as Botox injections made her to have facial twitching from Botox therapy. She was never given any diagnosis but she was told that she needs to be followed up for multiple sclerosis. She comes to the clinic stating that she has been having gait difficulties and tremors or shaking at night occurring every night affecting her sleep and weakness of lower extremities and right wrist is acting up and headaches never got better even after having had botox injection therapy. She does not want to go back to headache clinic and also does not want to have botox injection therapy. She also has been having constant aching pain \" feeling of getting big and about to explode\" lasting for several hours of the day and occur almost everyday; pain at level 7/10 severity. She has photophobia but denied phonophobia. She does have nausea all the time and denied vomiting. She was on Zofran in the past with help. She does have dizziness with lightheadedness and occasional room spinning sensations. She has been taking ibuprofen on frequent basis. She has taken Percocet and Vicodin but she is aware of addiction potential.   She has been having increasing gait difficulties and tripping on her own feet. She is very concerned about these problems and not able to function properly.   She has been bothered by fatigue waking up with fatigue even after having good night sleep. She also has been having neck pain and radiating pain into the arms and also with occasional numbness in arms and also at times; she has been having loss of fine motor control in hands or clumsiness in the hands. She has been having weakness in lower extremities. Denies bladder dysfunction. But admits to having electric shock-like sensation in the neck, radiating down the spine or into the arms by bending the neck forward. Review of systems done by staff reviewed and pertinent positives include headaches, dizziness and gait difficulties as stated above. Current Outpatient Medications on File Prior to Visit   Medication Sig Dispense Refill    vitamin D (ERGOCALCIFEROL) 35795 units CAPS capsule Take 1 capsule by mouth once a week 12 capsule 0    ferrous sulfate 325 (65 Fe) MG EC tablet Take 1 tablet by mouth daily (with breakfast) 30 tablet 5     No current facility-administered medications on file prior to visit. Allergies: Florentin Boone is allergic to effexor [venlafaxine hcl]; morphine; and pcn [penicillins]. Past Medical History:   Diagnosis Date    Anxiety     unison ,on meds in past     CAD (coronary artery disease) 5/5/2014    Chronic back pain     Depression     post partum and childhood    Fibromyalgia     Headache(784.0)     High cholesterol     Palpitations        Past Surgical History:   Procedure Laterality Date    APPENDECTOMY      SPINE SURGERY      TUBAL LIGATION       Social History: Florentin Boone  reports that she has never smoked. She has never used smokeless tobacco. She reports that she drinks alcohol. She reports that she does not use drugs.     Family History   Problem Relation Age of Onset    Diabetes Mother     Cancer Mother         breast  age 76 cervical     Alcohol Abuse Father     Coronary Art Dis Sister     Cervical Cancer Sister    [facility] Breast Cancer Sister         age 48 Blood pressure 113/78, pulse 66, height 5' 3\" (1.6 m), weight 195 lb 12.8 oz (88.8 kg). GENERAL  Appears uncomfortable and exhausted due to lack of sleep but in no acute cardioresp distress   HEENT  NC/ AT   NECK  Supple and no bruits heard   MENTAL STATUS:  Alert, oriented, intact memory, no confusion, normal speech, normal language, no hallucination or delusion   CRANIAL NERVES: II     -      DEREK, Fundi reveal intact venous pulsations; Visual fields intact to confrontation  III,IV,VI -  EOMs full, no afferent defect, no                      MISBAH, no ptosis  V     -     Normal facial sensation  VII    -     Normal facial symmetry  VIII   -     Intact hearing  IX,X -     Symmetrical palate  XI    -     Symmetrical shoulder shrug  XII   -     Midline tongue, no atrophy    MOTOR FUNCTION:  significant for good strength of grade 5/5 in bilateral proximal and distal muscle groups of both upper and lower extremities with normal bulk, normal tone and no involuntary movements, no tremor   SENSORY FUNCTION:  Normal touch, normal pin, normal vibration, normal proprioception   CEREBELLAR FUNCTION:  Intact fine motor control over upper limbs   REFLEX FUNCTION:  Symmetric and brisk DTRs with equivocal plantars bilaterally   STATION and GAIT  Normal station, unable to walk on heels and toes and unable to do tandem gait           Diagnostic data reviewed with the patient:     MRI brain (w/wo) (11-3-2014): No acute or subacute ischemic insult seen. Multiple scattered white matter changes. MRI cervical spine (w/wo) (11-3-2014): with multilevel degenerative changes. There is no abnormal signal or enhancement within the cervical spine.      EEG (11-3-2014): normal.         Impression and Plan: Ms. Anjel Alvarado is a 50 y.o. female with   Chronic daily headaches; refractory to NSAIDs and failed Botox therapy  Progressive gait difficulties with dizziness and unsteadiness with features raising concern for myelopathy Unprovoked do fatigue and increased affective disorder symptoms due to ongoing symptomatology  Hx of abnormal brain MRI with white matter changes    Will get MRI brain(w/wo) and MRI cervical spine (w/wo) in further evaluation  She has tried multiple medications and she is more concerned about diagnostic possibilities. Therefore will see her in the clinic once the above testing is done. Please note that portions of this note were completed with a voice recognition program.  Although every effort was made to ensure the accuracy of this  automated transcription, some errors in transcription may have occurred, occasionally words are mis-transcribed. If you have questions, please do not hesitate to call me. I look forward to following Kim along with you.     Sincerely,        Allyson Muhammad MD

## 2019-04-18 NOTE — PROGRESS NOTES
NEUROLOGY FOLLOW-UP  Patient Name:  Em Potts  :   1970  Clinic Visit Date: 2019        REVIEW OF SYSTEMS  Constitutional Weight changes: present, Fevers : absent, Fatigue: present; Any recent hospitalizations:  absent.    HEENT Ears: pain and ringing, Eyes: normal, Visual disturbance: present   Reespiratory Shortness of breath: present, Cough: absent   Cardivascular Chest pain: present, Leg swelling :absent   GI Constipation: absent, Diarrhea: absent, Swallowing change: absent    Urinary frequency: present, Urinary urgency: absent, Urinary incontinence: absent   Musculoskeletal Neck pain: present, Back pain: present, Stiffness: present, Muscle pain: present, Joint pain: present   Dermatological Hair loss: absent, Skin changes: absent   Neurological Memory loss: present, Confusion: present, Seizures: absent; Trouble walking or imbalance: present, Dizziness: present, Weakness: present, Numbness present, Tremor: present, Spasm: present, Speech difficulty: present, Headache: present, Light sensitivity: present   Psychiatric Anxiety: present, Depression  present, Suicidal ideations absent   Hematologic Abnormal bleeding: absent, Anemia: present, Clotting disorder: absent, Lymph gland changes: absent

## 2019-04-26 ENCOUNTER — HOSPITAL ENCOUNTER (OUTPATIENT)
Age: 49
Setting detail: SPECIMEN
Discharge: HOME | End: 2019-04-26
Payer: COMMERCIAL

## 2019-04-26 ENCOUNTER — OFFICE VISIT (OUTPATIENT)
Dept: UROLOGY | Age: 49
End: 2019-04-26
Payer: COMMERCIAL

## 2019-04-26 VITALS
HEART RATE: 76 BPM | HEIGHT: 63 IN | SYSTOLIC BLOOD PRESSURE: 103 MMHG | BODY MASS INDEX: 34.55 KG/M2 | DIASTOLIC BLOOD PRESSURE: 69 MMHG | WEIGHT: 195 LBS

## 2019-04-26 DIAGNOSIS — R31.21 ASYMPTOMATIC MICROSCOPIC HEMATURIA: ICD-10-CM

## 2019-04-26 DIAGNOSIS — R31.9 HEMATURIA, UNSPECIFIED TYPE: ICD-10-CM

## 2019-04-26 DIAGNOSIS — R31.9 HEMATURIA, UNSPECIFIED TYPE: Primary | ICD-10-CM

## 2019-04-26 LAB
BILIRUBIN, POC: NEGATIVE
BLOOD URINE, POC: ABNORMAL
CLARITY, POC: CLEAR
COLOR, POC: YELLOW
GLUCOSE URINE, POC: NEGATIVE
KETONES, POC: NEGATIVE
LEUKOCYTE EST, POC: NEGATIVE
NITRITE, POC: NEGATIVE
PH, POC: 5.5
PROTEIN, POC: NEGATIVE
SPECIFIC GRAVITY, POC: 1.02
UROBILINOGEN, POC: 0.2

## 2019-04-26 PROCEDURE — 81003 URINALYSIS AUTO W/O SCOPE: CPT | Performed by: UROLOGY

## 2019-04-26 PROCEDURE — G8427 DOCREV CUR MEDS BY ELIG CLIN: HCPCS | Performed by: UROLOGY

## 2019-04-26 PROCEDURE — 99203 OFFICE O/P NEW LOW 30 MIN: CPT | Performed by: UROLOGY

## 2019-04-26 PROCEDURE — 1036F TOBACCO NON-USER: CPT | Performed by: UROLOGY

## 2019-04-26 PROCEDURE — G8417 CALC BMI ABV UP PARAM F/U: HCPCS | Performed by: UROLOGY

## 2019-04-26 NOTE — PROGRESS NOTES
[facility] [place], Bennett Naik Tyler. [facility] [address] [facility] Consultation / New Patient Visit    Patient:  Jennie Kim  YOB: 1970  Date: 4/26/2019  Consult requested from Alli Velásquez MD   for purpose of evaluation of AMH. HISTORY OF PRESENT ILLNESS:   The patient is a 50 y.o. female who presents today for follow-up for the following problem(s): AMH  Overall the problem(s) : are improving. Associated Symptoms: No dysuria, gross hematuria. Pain Severity:   0/10    Hematuria:   History of AMH  Previous work up many years ago, unremarkable  Asymptomatic    Non smoker  No blood thinning meds   history: Kidney stones   family history: none  Environmental exposure: none  Hematuria,  has resolved at this time  Imaging: none    Summary of old records: N/A  (Patient's old records, notes and chart reviewed and summarized above.)    Urinalysis today:  No results found for this visit on 04/26/19.     Last BUN and creatinine:  Lab Results   Component Value Date    BUN 9 04/18/2019     Lab Results   Component Value Date    CREATININE 0.87 04/18/2019       Imaging Reviewed during this Office Visit:   (results were independently reviewed by physician and radiology report verified)    PAST MEDICAL, FAMILY AND SOCIAL HISTORY:  Past Medical History:   Diagnosis Date    Anxiety     unison ,on meds in past     CAD (coronary artery disease) 5/5/2014    Chronic back pain     Depression     post partum and childhood    Fibromyalgia     Headache(784.0)     High cholesterol     Palpitations      Past Surgical History:   Procedure Laterality Date    APPENDECTOMY      SPINE SURGERY      TUBAL LIGATION       Family History   Problem Relation Age of Onset    Diabetes Mother     Cancer Mother         breast  age 76 cervical     Alcohol Abuse Father     Coronary Art Dis Sister     Cervical Cancer Sister     Breast Cancer Sister         age 48     Outpatient Medications Marked as Taking for the 4/26/19 encounter (Office Visit) with Justin Ross MD   Medication Sig Dispense Refill    vitamin D (ERGOCALCIFEROL) 76719 units CAPS capsule Take 1 capsule by mouth once a week 12 capsule 0    ferrous sulfate 325 (65 Fe) MG EC tablet Take 1 tablet by mouth daily (with breakfast) 30 tablet 5       Effexor [venlafaxine hcl]; Morphine; and Pcn [penicillins]  Social History     Tobacco Use   Smoking Status Never Smoker   Smokeless Tobacco Never Used       Social History     Substance and Sexual Activity   Alcohol Use Yes    Alcohol/week: 0.0 oz    Comment: rarely       REVIEW OF SYSTEMS:  Constitutional: negative  Eyes: negative  Respiratory: negative  Cardiovascular: negative  Gastrointestinal: Positive for abdominal pain, nausea  negative  Genitourinary: positive for difficulty urinating, flank pain, blood in urine, pelvic pain  Musculoskeletal: positive for  Back pain, muscle aches   Skin: negative   Neurological: negative  Hematological/Lymphatic: negative  Psychological: negative    Physical Exam:    This a 50 y.o. female    There were no vitals filed for this visit. Constitutional: Patient in no acute distress   Neuro: alert and oriented to person place and time. Psych: Mood and affect normal.  Head: atraumatic normocephalic  Eyes: EOMi  HEENT: neck supple, trachea midline  Lungs: Respiratory effort normal  Cardiovascular:  Normal peripheral pulses  Abdomen: Soft, non-tender, non-distended, No CVA  Bladder: non-tender and not distended. FROMx4, no cyanosis clubbing edema  Skin: warm and dry     Assessment and Plan      1. Hematuria, unspecified type    2. Asymptomatic microscopic hematuria           Plan:      No follow-ups on file.   AMH workup  CTU  Cystoscopy  UA UCx

## 2019-04-26 NOTE — PATIENT INSTRUCTIONS
Patient Education        Blood in the Urine: Care Instructions  Your Care Instructions    Blood in the urine, or hematuria, may make the urine look red, brown, or pink. There may be blood every time you urinate or just from time to time. You cannot always see blood in the urine, but it will show up in a urine test.  Blood in the urine may be serious. It should always be checked by a doctor. Your doctor may recommend more tests, including an X-ray, a CT scan, or a cystoscopy (which lets a doctor look inside the urethra and bladder). Blood in the urine can be a sign of another problem. Common causes are bladder infections and kidney stones. An injury to your groin or your genital area can also cause bleeding in the urinary tract. Very hard exercise--such as running a marathon--can cause blood in the urine. Blood in the urine can also be a sign of kidney disease or cancer in the bladder or kidney. Many cases of blood in the urine are caused by a harmless condition that runs in families. This is called benign familial hematuria. It does not need any treatment. Sometimes your urine may look red or brown even though it does not contain blood. For example, not getting enough fluids (dehydration), taking certain medicines, or having a liver problem can change the color of your urine. Eating foods such as beets, rhubarb, or blackberries or foods with red food coloring can make your urine look red or pink. Follow-up care is a key part of your treatment and safety. Be sure to make and go to all appointments, and call your doctor if you are having problems. It's also a good idea to know your test results and keep a list of the medicines you take. When should you call for help? Call your doctor now or seek immediate medical care if:    · You have symptoms of a urinary infection. For example:  ? You have pus in your urine. ? You have pain in your back just below your rib cage. This is called flank pain. ?  You have a fever, chills, or body aches. ? It hurts to urinate. ? You have groin or belly pain.     · You have more blood in your urine.    Watch closely for changes in your health, and be sure to contact your doctor if:    · You have new urination problems.     · You do not get better as expected. Where can you learn more? Go to https://Quarticspepiceweb.RallyOn. org and sign in to your [employer] account. Enter H258 in the 60mo box to learn more about \"Blood in the Urine: Care Instructions. \"     If you do not have an account, please click on the \"Sign Up Now\" link. Current as of: March 20, 2018  Content Version: 11.9  © 5428-6194 [employer]. Care instructions adapted under license by [facility] ([facility]). If you have questions about a medical condition or this instruction, always ask your healthcare professional. [address] any warranty or liability for your use of this information.

## 2019-04-27 LAB
CULTURE: NORMAL
Lab: NORMAL
SPECIMEN DESCRIPTION: NORMAL

## 2019-05-02 ENCOUNTER — HOSPITAL ENCOUNTER (OUTPATIENT)
Dept: MRI IMAGING | Age: 49
Discharge: HOME | End: 2019-05-04
Payer: COMMERCIAL

## 2019-05-02 DIAGNOSIS — G95.9 MYELOPATHY (HCC): ICD-10-CM

## 2019-05-02 DIAGNOSIS — G44.221 CHRONIC TENSION-TYPE HEADACHE, INTRACTABLE: ICD-10-CM

## 2019-05-02 DIAGNOSIS — R42 DIZZINESS: ICD-10-CM

## 2019-05-02 DIAGNOSIS — R26.9 GAIT DIFFICULTY: ICD-10-CM

## 2019-05-02 PROCEDURE — A9576 INJ PROHANCE MULTIPACK: HCPCS | Performed by: PSYCHIATRY & NEUROLOGY

## 2019-05-02 PROCEDURE — 72156 MRI NECK SPINE W/O & W/DYE: CPT

## 2019-05-02 PROCEDURE — 70553 MRI BRAIN STEM W/O & W/DYE: CPT

## 2019-05-02 PROCEDURE — 6360000004 HC RX CONTRAST MEDICATION: Performed by: PSYCHIATRY & NEUROLOGY

## 2019-05-02 RX ADMIN — GADOTERIDOL 17 ML: 279.3 INJECTION, SOLUTION INTRAVENOUS at 15:50

## 2019-05-03 ENCOUNTER — HOSPITAL ENCOUNTER (EMERGENCY)
Age: 49
Discharge: HOME | End: 2019-05-03
Attending: EMERGENCY MEDICINE
Payer: COMMERCIAL

## 2019-05-03 ENCOUNTER — HOSPITAL ENCOUNTER (OUTPATIENT)
Dept: CT IMAGING | Age: 49
Discharge: HOME | End: 2019-05-05
Payer: COMMERCIAL

## 2019-05-03 VITALS
RESPIRATION RATE: 14 BRPM | DIASTOLIC BLOOD PRESSURE: 67 MMHG | WEIGHT: 195 LBS | HEART RATE: 76 BPM | OXYGEN SATURATION: 98 % | SYSTOLIC BLOOD PRESSURE: 118 MMHG | TEMPERATURE: 97.3 F | BODY MASS INDEX: 34.54 KG/M2

## 2019-05-03 DIAGNOSIS — R31.21 ASYMPTOMATIC MICROSCOPIC HEMATURIA: ICD-10-CM

## 2019-05-03 DIAGNOSIS — M54.50 CHRONIC BILATERAL LOW BACK PAIN WITHOUT SCIATICA: Primary | ICD-10-CM

## 2019-05-03 DIAGNOSIS — G89.29 CHRONIC BILATERAL LOW BACK PAIN WITHOUT SCIATICA: Primary | ICD-10-CM

## 2019-05-03 DIAGNOSIS — R31.9 HEMATURIA, UNSPECIFIED TYPE: ICD-10-CM

## 2019-05-03 LAB
-: ABNORMAL
ABSOLUTE EOS #: 0.12 K/UL (ref 0–0.44)
ABSOLUTE IMMATURE GRANULOCYTE: 0.03 K/UL (ref 0–0.3)
ABSOLUTE LYMPH #: 2.6 K/UL (ref 1.1–3.7)
ABSOLUTE MONO #: 0.91 K/UL (ref 0.1–1.2)
ALBUMIN SERPL-MCNC: 3.9 G/DL (ref 3.5–5.2)
ALBUMIN/GLOBULIN RATIO: 1.3 (ref 1–2.5)
ALP BLD-CCNC: 78 U/L (ref 35–104)
ALT SERPL-CCNC: 18 U/L (ref 5–33)
AMORPHOUS: ABNORMAL
ANION GAP SERPL CALCULATED.3IONS-SCNC: 11 MMOL/L (ref 9–17)
AST SERPL-CCNC: 14 U/L
BACTERIA: ABNORMAL
BASOPHILS # BLD: 0 % (ref 0–2)
BASOPHILS ABSOLUTE: 0.04 K/UL (ref 0–0.2)
BILIRUB SERPL-MCNC: 0.33 MG/DL (ref 0.3–1.2)
BILIRUBIN URINE: NEGATIVE
BUN BLDV-MCNC: 9 MG/DL (ref 6–20)
BUN/CREAT BLD: NORMAL (ref 9–20)
CALCIUM SERPL-MCNC: 8.9 MG/DL (ref 8.6–10.4)
CASTS UA: ABNORMAL /LPF (ref 0–8)
CHLORIDE BLD-SCNC: 100 MMOL/L (ref 98–107)
CO2: 24 MMOL/L (ref 20–31)
COLOR: YELLOW
CREAT SERPL-MCNC: 0.88 MG/DL (ref 0.5–0.9)
CRYSTALS, UA: ABNORMAL /HPF
DIFFERENTIAL TYPE: ABNORMAL
EOSINOPHILS RELATIVE PERCENT: 1 % (ref 1–4)
EPITHELIAL CELLS UA: ABNORMAL /HPF (ref 0–5)
GFR AFRICAN AMERICAN: >60 ML/MIN
GFR NON-AFRICAN AMERICAN: >60 ML/MIN
GFR SERPL CREATININE-BSD FRML MDRD: NORMAL ML/MIN/{1.73_M2}
GFR SERPL CREATININE-BSD FRML MDRD: NORMAL ML/MIN/{1.73_M2}
GLUCOSE BLD-MCNC: 83 MG/DL (ref 70–99)
GLUCOSE URINE: NEGATIVE
HCT VFR BLD CALC: 39.5 % (ref 36.3–47.1)
HEMOGLOBIN: 12.9 G/DL (ref 11.9–15.1)
IMMATURE GRANULOCYTES: 0 %
KETONES, URINE: NEGATIVE
LEUKOCYTE ESTERASE, URINE: NEGATIVE
LIPASE: 50 U/L (ref 13–60)
LYMPHOCYTES # BLD: 24 % (ref 24–43)
MCH RBC QN AUTO: 30.1 PG (ref 25.2–33.5)
MCHC RBC AUTO-ENTMCNC: 32.7 G/DL (ref 28.4–34.8)
MCV RBC AUTO: 92.1 FL (ref 82.6–102.9)
MONOCYTES # BLD: 8 % (ref 3–12)
MUCUS: ABNORMAL
NITRITE, URINE: NEGATIVE
NRBC AUTOMATED: 0 PER 100 WBC
OTHER OBSERVATIONS UA: ABNORMAL
PDW BLD-RTO: 12.1 % (ref 11.8–14.4)
PH UA: 6 (ref 5–8)
PLATELET # BLD: 180 K/UL (ref 138–453)
PLATELET ESTIMATE: ABNORMAL
PMV BLD AUTO: 10.6 FL (ref 8.1–13.5)
POTASSIUM SERPL-SCNC: 4 MMOL/L (ref 3.7–5.3)
PROTEIN UA: NEGATIVE
RBC # BLD: 4.29 M/UL (ref 3.95–5.11)
RBC # BLD: ABNORMAL 10*6/UL
RBC UA: ABNORMAL /HPF (ref 0–4)
RENAL EPITHELIAL, UA: ABNORMAL /HPF
SEG NEUTROPHILS: 67 % (ref 36–65)
SEGMENTED NEUTROPHILS ABSOLUTE COUNT: 7.13 K/UL (ref 1.5–8.1)
SODIUM BLD-SCNC: 135 MMOL/L (ref 135–144)
SPECIFIC GRAVITY UA: 1.02 (ref 1–1.03)
TOTAL PROTEIN: 6.8 G/DL (ref 6.4–8.3)
TRICHOMONAS: ABNORMAL
TURBIDITY: CLEAR
URINE HGB: ABNORMAL
UROBILINOGEN, URINE: NORMAL
WBC # BLD: 10.8 K/UL (ref 3.5–11.3)
WBC # BLD: ABNORMAL 10*3/UL
WBC UA: ABNORMAL /HPF (ref 0–5)
YEAST: ABNORMAL

## 2019-05-03 PROCEDURE — 6360000002 HC RX W HCPCS: Performed by: PHYSICIAN ASSISTANT

## 2019-05-03 PROCEDURE — 6360000004 HC RX CONTRAST MEDICATION: Performed by: UROLOGY

## 2019-05-03 PROCEDURE — 96374 THER/PROPH/DIAG INJ IV PUSH: CPT

## 2019-05-03 PROCEDURE — 81001 URINALYSIS AUTO W/SCOPE: CPT

## 2019-05-03 PROCEDURE — 80053 COMPREHEN METABOLIC PANEL: CPT

## 2019-05-03 PROCEDURE — 74178 CT ABD&PLV WO CNTR FLWD CNTR: CPT

## 2019-05-03 PROCEDURE — 99283 EMERGENCY DEPT VISIT LOW MDM: CPT

## 2019-05-03 PROCEDURE — 85025 COMPLETE CBC W/AUTO DIFF WBC: CPT

## 2019-05-03 PROCEDURE — 83690 ASSAY OF LIPASE: CPT

## 2019-05-03 RX ORDER — OXYCODONE HYDROCHLORIDE AND ACETAMINOPHEN 5; 325 MG/1; MG/1
1 TABLET ORAL EVERY 6 HOURS PRN
Qty: 6 TABLET | Refills: 0 | Status: SHIPPED | OUTPATIENT
Start: 2019-05-03 | End: 2019-05-05

## 2019-05-03 RX ORDER — KETOROLAC TROMETHAMINE 15 MG/ML
15 INJECTION, SOLUTION INTRAMUSCULAR; INTRAVENOUS ONCE
Status: COMPLETED | OUTPATIENT
Start: 2019-05-03 | End: 2019-05-03

## 2019-05-03 RX ADMIN — KETOROLAC TROMETHAMINE 15 MG: 15 INJECTION, SOLUTION INTRAMUSCULAR; INTRAVENOUS at 17:48

## 2019-05-03 RX ADMIN — IOHEXOL 130 ML: 350 INJECTION, SOLUTION INTRAVENOUS at 16:51

## 2019-05-03 ASSESSMENT — ENCOUNTER SYMPTOMS
VOMITING: 0
SHORTNESS OF BREATH: 0
COLOR CHANGE: 0
NAUSEA: 0
BACK PAIN: 1
SORE THROAT: 0
DIARRHEA: 0
ABDOMINAL PAIN: 0
COUGH: 0

## 2019-05-03 ASSESSMENT — PAIN SCALES - GENERAL
PAINLEVEL_OUTOF10: 9
PAINLEVEL_OUTOF10: 9

## 2019-05-03 ASSESSMENT — PAIN DESCRIPTION - ORIENTATION: ORIENTATION: RIGHT;LEFT

## 2019-05-03 ASSESSMENT — PAIN DESCRIPTION - LOCATION: LOCATION: FLANK

## 2019-05-03 NOTE — ED PROVIDER NOTES
[facility]     Emergency Department     Faculty Attestation    I performed a history and physical examination of the patient and discussed management with the resident. I have reviewed and agree with the residents findings including all diagnostic interpretations, and treatment plans as written. Any areas of disagreement are noted on the chart. I was personally present for the key portions of any procedures. I have documented in the chart those procedures where I was not present during the key portions. I have reviewed the emergency nurses triage note. I agree with the chief complaint, past medical history, past surgical history, allergies, medications, social and family history as documented unless otherwise noted below. Documentation of the HPI, Physical Exam and Medical Decision Making performed by marcosibsridhar is based on my personal performance of the HPI, PE and MDM. For Physician Assistant/ Nurse Practitioner cases/documentation I have personally evaluated this patient and have completed at least one if not all key elements of the E/M (history, physical exam, and MDM). Additional findings are as noted. Primary Care Physician: Lexis Castro MD    History: This is a 50 y.o. female who presents to the Emergency Department with complaint of lower back pain, radiating from the left side over to the right. Patient states it has been ongoing over the past week and a half. She does reports she's had a history of a kidney stone, she's also had a history of a back surgery years ago. She recently just followed with her urologist in regards to some previous issues she had with her bladder, and had a CT urogram today. After the imaging study she came up to the ER because she was just tired of having the pain. She reports that she's been taking Motrin 800s without relief of symptoms.   She does feel nauseated, she states nothing makes the pain better or worse.  She denies any dysuria or hematuria. No vomiting, no fevers or chills. No numbness, tingling or weakness. She has chronic increased frequency in urination which is unchanged. She denies a history of IV drug use. No recent back injections. Physical:   weight is 195 lb (88.5 kg). Her oral temperature is 97.3 °F (36.3 °C). Her blood pressure is 118/67 and her pulse is 76. Her respiration is 14 and oxygen saturation is 98%. Patient is well-appearing nontoxic, speaking in full sentences in no distress next line abdomen is soft nondistended nontender to palpation all quadrants no rebound or guarding noted  Patient with tenderness to palpation in the bilateral lumbar or spinal musculature bilaterally. Negative straight leg raise. 5 out of 5 lower extremity motor strength, pedal pulses are 2+ bilaterally, and sensation to light touch intact bilaterally in the lower extremities. Proprioception and takes toes is intact bilaterally. Patient ambulates without any difficulty. Impression: Bilateral back pain    Plan: We'll call radiology and upgrade the [facility] for her CT urogram.  And pain control, urine analysis. We'll also get basic labs.       Ross Mahoney D.O, M.P.H  Attending Emergency Medicine Physician         Ross Mahoney,   05/03/19 [id number]

## 2019-05-03 NOTE — ED PROVIDER NOTES
[address] [facility]  ED  eMERGENCY dEPARTMENT eNCOUnter      Pt Name: Janet Simon  MRN: [id number]  [place] 1970  Date of evaluation: 5/3/2019  Provider: Haylee Ferris Dr       Chief Complaint   Patient presents with    Flank Pain     both sides, more on the left. was in hospital getting CTs done per urologist for lower back pain but came to ED due to pain. pt states the pain has been ongoing for a week and a half. hx of kidney stones             HISTORY OF PRESENT ILLNESS  (Location/Symptom, Timing/Onset, Context/Setting, Quality, Duration, Modifying Factors, Severity.)   Janet Simon is a 50 y.o. female who presents to the emergencydepartment complaining of low back pain that radiates to her low abdomen starting approximately week and half ago. She does have a history of chronic low back pain and does also have a history of kidney stone and denies any hematuria and states she cannot differentiate between the 2 at this time. No chest pain or shortness of breath. No abdominal pain. No fevers or chills. No nausea or vomiting. She is ambulatory. No other symptoms. She is followed by urology at this time and did just have a CT urogram done today. REVIEW OF SYSTEMS    (2-9 systems for level 4, 10 ormore for level 5)     Review of Systems   Constitutional: Negative for chills, fatigue and fever. HENT: Negative for sore throat. Respiratory: Negative for cough and shortness of breath. Cardiovascular: Negative for chest pain, palpitations and leg swelling. Gastrointestinal: Negative for abdominal pain, diarrhea, nausea and vomiting. Genitourinary: Positive for flank pain. Negative for dysuria, frequency, hematuria, pelvic pain, vaginal bleeding and vaginal discharge. Musculoskeletal: Positive for back pain. Negative for neck pain and neck stiffness. Skin: Negative for color change.    Neurological: Negative for dizziness, facial asymmetry, speech Cardiovascular: Normal rate, regular rhythm, normal heart sounds and intact distal pulses. Exam reveals no gallop and no friction rub. No murmur heard. Pulmonary/Chest: Effort normal and breath sounds normal. No stridor. No respiratory distress. She has no wheezes. She has no rales. Abdominal: Soft. Bowel sounds are normal. She exhibits no distension and no mass. There is no tenderness. There is no rigidity, no rebound, no guarding, no CVA tenderness, no tenderness at McBurney's point and negative Mims's sign. No hernia. No peritoneal signs. Musculoskeletal: Normal range of motion. Back:    No midline lumbar tenderness. No radicular symptoms. No saddle anesthesia. No bowel or bladder dysfunction. No urinary retention. No IV drug use. Low suspicion for epidural abscess or cauda equina syndrome. Ambulatory. Neurovascularly intact. Cap refills less than 2 seconds in all extremities. Light sensation is intact. Proprioception is within normal limits. All compartments are soft and compressible. No septic joints noted. Distal pulses and deep tendon reflexes are within normal limits. Motor function is 5 out of 5 bilaterally in all extremities. Neurological: She is alert and oriented to person, place, and time. No cranial nerve deficit. Skin: Skin is warm and dry. Capillary refill takes less than 2 seconds. She is not diaphoretic. Psychiatric: She has a normal mood and affect. Her behavior is normal. Judgment and thought content normal.   Nursing note and vitals reviewed.         DIAGNOSTIC RESULTS       No orders to display         LABS:  Labs Reviewed   URINALYSIS WITH MICROSCOPIC - Abnormal; Notable for the following components:       Result Value    Urine Hgb TRACE (*)     All other components within normal limits   CBC WITH AUTO DIFFERENTIAL - Abnormal; Notable for the following components:    Seg Neutrophils 67 (*)     All other components within normal limits   COMPREHENSIVE METABOLIC PANEL LIPASE           EMERGENCY DEPARTMENT COURSE and DIFFERENTIAL DIAGNOSIS/MDM:   Vitals:    Vitals:    05/03/19 1727   BP: 118/67   Pulse: 76   Resp: 14   Temp: 97.3 °F (36.3 °C)   TempSrc: Oral   SpO2: 98%   Weight: 195 lb (88.5 kg)       This is a 49-year-old female presenting to the emergency department complaining of low back pain and flank pain. This could potentially be a kidney stone versus her chronic back pain. We will obtain basic laboratory studies including a urinalysis at this time. We will give her Toradol for pain and I will speak with radiology to have a stat read on her CT urogram. Laboratory studies are unremarkable at this time and her CT urogram was unremarkable from our standpoint as well. There is a potential this is colitis and I did discuss this with the patient. We will send her home with a short course of Percocet to take as needed and she should follow-up with her urologist as scheduled. Patient states she does feel better at this time and is ready for discharge. We will discharge patient home in stable condition and nontoxic-appearing. Patient understood and will comply. Patient is satisfied. All questions answered. Attending physician, myself, and patient agree no further workup is necessary at this time. Patient was given very strict return protocols and is completely agreeable with this plan. This patient was seen by the attending [phone] they agreed with the assessment and plan. CONSULTS:  None    PROCEDURES:  Procedures    FINAL IMPRESSION      1.  Chronic bilateral low back pain without sciatica          DISPOSITION/PLAN   DISPOSITION        PATIENT REFERRED TO:  [facility] ED  [address] [address]  [phone]  Go to   As needed, If symptoms worsen    Debra Gomez MD  [address] [facility]  [address] [facility]  [address] [address]  [phone]    Schedule an appointment as soon as possible for a visit in 3 days  For Re-check      DISCHARGE MEDICATIONS:  New Prescriptions    OXYCODONE-ACETAMINOPHEN (PERCOCET) 5-325 MG PER TABLET    Take 1 tablet by mouth every 6 hours as needed for Pain for up to 2 days. Intended supply: 3 days.  Take lowest dose possible to manage pain       (Please note that portions of this note were completed with a voice recognition program.  Efforts were made to edit the dictations but occasionally words are mis-transcribed.)    ONUR Thrasher PA-C  05/03/19 7367

## 2019-05-06 ENCOUNTER — TELEPHONE (OUTPATIENT)
Dept: NEUROLOGY | Age: 49
End: 2019-05-06

## 2019-05-06 ENCOUNTER — TELEPHONE (OUTPATIENT)
Dept: FAMILY MEDICINE CLINIC | Age: 49
End: 2019-05-06

## 2019-05-06 NOTE — TELEPHONE ENCOUNTER
[facility]) ED Follow up Call    Reason for ED visit:  CHRONIC LOW BACK PAIN         Kip Valdivia , this is HYACINTH from Dr. Gita Vizcarra office, just calling to see how you are doing after your recent ED visit. Did you receive discharge instructions? Do you understand the discharge instructions? Did the ED give you any new prescriptions? Were you able to fill your prescriptions? Do you have one of our red, yellow and green  Zone sheets that help you to determine when you should go to the ED? Do you need or want to make a follow up appt with your PCP? Do you have any further needs in the home i.e. Equipment? FU appts/Provider:    Future Appointments   Date Time Provider Derek Rosas   6/5/2019  9:40 AM Brandie Valdez MD [facility]   6/11/2019  9:30 [facility]       VOICEMAIL DOCUMENTATION - ERASE IF NOT USED  Hi, this message is for: [facility]  This is Hyacinth from [address] [place] [address] office. Just calling to see how you are doing after your recent visit to the Emergency Room. [address] [place] [address] wants to make sure you were able to fill any prescriptions and that you understand your discharge instructions. Please return our call if you need to make a follow up appointment with your provider or have any further needs. Our phone number is [phone]. Have a great day.

## 2019-05-06 NOTE — TELEPHONE ENCOUNTER
Skip called in today and lm she would like her MRI results. The results are in. Can you please advise. She also wanted to see if she can come in sooner as she is having back issues.   At this time June is soonest we have but will put on cancellation list.

## 2019-05-07 NOTE — TELEPHONE ENCOUNTER
Tito Husain  Please let the patient know that   MRI Brain is showing nonspecific white matter changes  MRI Cervical spine is showing multiple bone spurs causing some compression on nerve roots but no compression on spinal cord  I review findings more during upcoming visit   Thank you.   -dr. Alexus Lopez

## 2019-05-09 ENCOUNTER — TELEPHONE (OUTPATIENT)
Dept: NEUROLOGY | Age: 49
End: 2019-05-09

## 2019-05-17 ENCOUNTER — TELEPHONE (OUTPATIENT)
Dept: UROLOGY | Age: 49
End: 2019-05-17

## 2019-05-17 ENCOUNTER — OFFICE VISIT (OUTPATIENT)
Dept: NEUROLOGY | Age: 49
End: 2019-05-17
Payer: COMMERCIAL

## 2019-05-17 VITALS
HEIGHT: 63 IN | SYSTOLIC BLOOD PRESSURE: 112 MMHG | HEART RATE: 69 BPM | DIASTOLIC BLOOD PRESSURE: 73 MMHG | BODY MASS INDEX: 34.91 KG/M2 | WEIGHT: 197 LBS

## 2019-05-17 DIAGNOSIS — M54.50 ACUTE LEFT-SIDED LOW BACK PAIN WITHOUT SCIATICA: ICD-10-CM

## 2019-05-17 DIAGNOSIS — G44.221 CHRONIC TENSION-TYPE HEADACHE, INTRACTABLE: Primary | ICD-10-CM

## 2019-05-17 PROCEDURE — 1036F TOBACCO NON-USER: CPT | Performed by: PSYCHIATRY & NEUROLOGY

## 2019-05-17 PROCEDURE — 99214 OFFICE O/P EST MOD 30 MIN: CPT | Performed by: PSYCHIATRY & NEUROLOGY

## 2019-05-17 PROCEDURE — G8427 DOCREV CUR MEDS BY ELIG CLIN: HCPCS | Performed by: PSYCHIATRY & NEUROLOGY

## 2019-05-17 PROCEDURE — G8417 CALC BMI ABV UP PARAM F/U: HCPCS | Performed by: PSYCHIATRY & NEUROLOGY

## 2019-05-17 RX ORDER — TOPIRAMATE 25 MG/1
TABLET ORAL
Qty: 70 TABLET | Refills: 0 | Status: SHIPPED | OUTPATIENT
Start: 2019-05-17 | End: 2020-09-15 | Stop reason: ALTCHOICE

## 2019-05-17 RX ORDER — TOPIRAMATE 50 MG/1
50 TABLET, FILM COATED ORAL 2 TIMES DAILY
Qty: 60 TABLET | Refills: 3 | Status: SHIPPED | OUTPATIENT
Start: 2019-05-17 | End: 2020-09-15 | Stop reason: ALTCHOICE

## 2019-05-17 NOTE — PROGRESS NOTES
NEUROLOGY FOLLOW UP VISIT  Patient Name:       Percy So  :        1970  Clinic Visit Date:    2019    Dear Dr. Sancho Astudillo MD     I saw Ms. Percy So in follow-up in the office today in continuation of neurologic care. As you know she  is a 50 y.o. right handed  female is seen in clinic on 19 for evaluation of chronic daily headaches. Today is the first follow up visit. She also stated that in addition to ongoing headaches she has been having low back pain predominantly in left lower back and it does not radiate down the extremities but it does radiate down into the hip. She has extreme difficulty with ongoing back pain. Denied bladder dysfunction. Regarding headaches she was evaluated at [facility] clinic in the past.  She was treated with steroid injections and Botox injections without any relief. She stopped going to [facility] clinic as she started having side effects from Botox injection therapy. She also has been having constant aching pain \" feeling of getting big and about to explode\" lasting for several hours of the day and occur almost everyday; pain at level 7/10 severity. She has photophobia but denied phonophobia. She does have nausea all the time and denied vomiting. She was on Zofran in the past with help. She does have dizziness with lightheadedness and occasional room spinning sensations. She has been taking ibuprofen on frequent basis. She has taken Percocet and Vicodin but she is aware of addiction potential.   She has been bothered by fatigue waking up with fatigue even after having good night sleep. She also has been having neck pain and radiating pain into the arms and also with occasional numbness in arms and also at times; she has been having loss of fine motor control in hands or clumsiness in the hands. She has been having weakness in lower extremities. Denies bladder dysfunction.  But admits to having electric shock-like sensation in the neck, radiating down the spine or into the arms by bending the neck forward. Review of systems done by staff reviewed and pertinent positives include headaches, dizziness and gait difficulties as stated above. Current Outpatient Medications on File Prior to Visit   Medication Sig Dispense Refill    vitamin D (ERGOCALCIFEROL) 47417 units CAPS capsule Take 1 capsule by mouth once a week 12 capsule 0    ferrous sulfate 325 (65 Fe) MG EC tablet Take 1 tablet by mouth daily (with breakfast) 30 tablet 5     No current facility-administered medications on file prior to visit. Allergies: Dillan Rocha is allergic to effexor [venlafaxine hcl]; morphine; and pcn [penicillins]. Past Medical History:   Diagnosis Date    Anxiety     unison ,on meds in past     CAD (coronary artery disease) 5/5/2014    Chronic back pain     Depression     post partum and childhood    Fibromyalgia     Headache(784.0)     High cholesterol     Palpitations        Past Surgical History:   Procedure Laterality Date    APPENDECTOMY      SPINE SURGERY      TUBAL LIGATION       Social History: Dillan Rocha  reports that she has never smoked. She has never used smokeless tobacco. She reports that she drinks alcohol. She reports that she does not use drugs. Family History   Problem Relation Age of Onset    Diabetes Mother     Cancer Mother         breast  age 76 cervical     Alcohol Abuse Father     Coronary Art Dis Sister     Cervical Cancer Sister     Breast Cancer Sister         age 48     Blood pressure 112/73, pulse 69, height 5' 3\" (1.6 m), weight 197 lb (89.4 kg), not currently breastfeeding.     GENERAL  Appears uncomfortable and exhausted due to lack of sleep but in no acute cardioresp distress   HEENT  NC/ AT   NECK  Supple and no bruits heard   MENTAL STATUS:  Alert, oriented, intact memory, no confusion, normal speech, normal language, no hallucination or delusion   CRANIAL NERVES: II     -      DEREK, Fundi reveal intact venous pulsations; Visual fields intact to confrontation  III,IV,VI -  EOMs full, no afferent defect, no                      MISBAH, no ptosis  V     -     Normal facial sensation  VII    -     Normal facial symmetry  VIII   -     Intact hearing  IX,X -     Symmetrical palate  XI    -     Symmetrical shoulder shrug  XII   -     Midline tongue, no atrophy    MOTOR FUNCTION:  significant for good strength of grade 5/5 in bilateral proximal and distal muscle groups of both upper and lower extremities with normal bulk, normal tone and no involuntary movements, no tremor; SLR negative. She has tenderness in left lower back. SENSORY FUNCTION:  Normal touch, normal pin, normal vibration, normal proprioception   CEREBELLAR FUNCTION:  Intact fine motor control over upper limbs   REFLEX FUNCTION:  Symmetric and brisk DTRs with equivocal plantars bilaterally   STATION and GAIT  Normal station, unable to walk on heels and toes and unable to do tandem gait           Diagnostic data reviewed with the patient:     MRI brain (w/wo) (11-3-2014): No acute or subacute ischemic insult seen. Multiple scattered white matter changes. MRI cervical spine (w/wo) (11-3-2014): with multilevel degenerative changes. There is no abnormal signal or enhancement within the cervical spine. EEG (11-3-2014): normal.     MRI Brain (w/wo) 5/2/19:      Unchanged nonspecific T2 hyperintense white matter foci. MRI Cervical Spine (w/wo) 5/2/19:   Multilevel degenerative disc disease with associated uncovertebral and facet   hypertrophy.       Moderate right and severe left foraminal narrowing at C6-7. Impression and Plan: Ms. Cleopatra Miller is a 50 y.o. female with   Chronic daily headaches; refractory to NSAIDs and failed Botox therapy; MRI brain and MRI cervical Spine unrevealing as above.  She is willing to try prophylactic meds as headaches continued; will start her on topiramate at smaller dose with gradual dose escalation. Medication counseling including risks and benefits discussed. She denied history of glaucoma, renal stones etc.      3-4 week history of progressive low back pain with features suggestive of left sacral iliac joint dysfunction: Will refer her to her prior orthopedic surgeon. Unprovoked fatigue and increased affective disorder symptoms due to ongoing symptomatology; does not want to be on any antidepressant therapy. Follow-up in 3 months. Please note that portions of this note were completed with a voice recognition program.  Although every effort was made to ensure the accuracy of this  automated transcription, some errors in transcription may have occurred, occasionally words are mis-transcribed.

## 2019-05-17 NOTE — PROGRESS NOTES
NEUROLOGY FOLLOW-UP  Patient Name:  Prieto Chery  :   1970  Clinic Visit Date: 2019        REVIEW OF SYSTEMS  Constitutional Weight changes: present, Fevers : absent, Fatigue: present; Any recent hospitalizations:  absent.    HEENT Ears: pain, Eyes: no correction, Visual disturbance: present   Reespiratory Shortness of breath: absent, Cough: absent   Cardivascular Chest pain: absent, Leg swelling :absent   GI Constipation: absent, Diarrhea: absent, Swallowing change: absent    Urinary frequency: present, Urinary urgency: absent, Urinary incontinence: absent   Musculoskeletal Neck pain: present, Back pain: present, Stiffness: present, Muscle pain: present, Joint pain: present   Dermatological Hair loss: absent, Skin changes: absent   Neurological Memory loss: absent, Confusion: absent, Seizures: absent; Trouble walking or imbalance: present, Dizziness: absent, Weakness: present, Numbness present, Tremor: present, Spasm: present, Speech difficulty: absent, Headache: present, Light sensitivity: absent   Psychiatric Anxiety: absent, Depression  absent, Suicidal ideations absent   Hematologic Abnormal bleeding: absent, Anemia: present, Clotting disorder: absent, Lymph gland changes: absent

## 2019-05-17 NOTE — TELEPHONE ENCOUNTER
Patient is scheduled for surgery on 5/24 at 1:00PM.  Left voicemail with date, time and instructions and to call back and confirm. Letter mailed out today.

## 2019-05-24 ENCOUNTER — HOSPITAL ENCOUNTER (OUTPATIENT)
Age: 49
Setting detail: OUTPATIENT SURGERY
Discharge: HOME | End: 2019-05-24
Attending: UROLOGY | Admitting: UROLOGY
Payer: COMMERCIAL

## 2019-05-24 VITALS
DIASTOLIC BLOOD PRESSURE: 64 MMHG | SYSTOLIC BLOOD PRESSURE: 95 MMHG | OXYGEN SATURATION: 98 % | RESPIRATION RATE: 16 BRPM | TEMPERATURE: 97.7 F | HEART RATE: 62 BPM

## 2019-05-24 PROCEDURE — 7100000040 HC SPAR PHASE II RECOVERY - FIRST 15 MIN: Performed by: UROLOGY

## 2019-05-24 PROCEDURE — 2709999900 HC NON-CHARGEABLE SUPPLY: Performed by: UROLOGY

## 2019-05-24 PROCEDURE — 3600000002 HC SURGERY LEVEL 2 BASE: Performed by: UROLOGY

## 2019-05-24 PROCEDURE — 3600000012 HC SURGERY LEVEL 2 ADDTL 15MIN: Performed by: UROLOGY

## 2019-05-24 PROCEDURE — 6370000000 HC RX 637 (ALT 250 FOR IP): Performed by: UROLOGY

## 2019-05-24 PROCEDURE — 7100000041 HC SPAR PHASE II RECOVERY - ADDTL 15 MIN: Performed by: UROLOGY

## 2019-05-24 RX ORDER — OXYBUTYNIN CHLORIDE 10 MG/1
10 TABLET, EXTENDED RELEASE ORAL DAILY
Qty: 30 TABLET | Refills: 1 | Status: SHIPPED | OUTPATIENT
Start: 2019-05-24 | End: 2020-09-15 | Stop reason: SDUPTHER

## 2019-05-24 ASSESSMENT — PAIN SCALES - GENERAL: PAINLEVEL_OUTOF10: 0

## 2019-05-24 ASSESSMENT — PAIN DESCRIPTION - DESCRIPTORS: DESCRIPTORS: ACHING

## 2019-05-24 ASSESSMENT — PAIN - FUNCTIONAL ASSESSMENT: PAIN_FUNCTIONAL_ASSESSMENT: 0-10

## 2019-05-24 NOTE — H&P
Descending colon is nondistended, limiting assessment of the bowel wall. There is no abnormal bowel distention or pericolonic inflammation. No free intraperitoneal air or fluid. Appendix is surgically absent. Pelvis: Uterus and adnexal structures are grossly unremarkable. No free fluid or lymphadenopathy in the pelvis. Peritoneum/Retroperitoneum: The abdominal aorta is normal in caliber. There is no retroperitoneal or mesenteric lymphadenopathy. Bones/Soft Tissues: There is no acute or suspicious osseous abnormality. Visualized superficial soft tissues are within normal limits. 1.  No urolithiasis or obstructive uropathy. No evidence of renal or urinary bladder mass. 2. Nondistended descending colon, limiting assessment of the bowel wall. Possibility of colitis would be difficult to exclude and clinical correlation is necessary. 3.  No other significant findings in the abdomen or pelvis. Past Medical History:    Past Medical History:   Diagnosis Date    Anxiety     unison ,on meds in past     CAD (coronary artery disease) 5/5/2014    Chronic back pain     Depression     post partum and childhood    Fibromyalgia     Headache(784.0)     High cholesterol     Palpitations        Past Surgical History:    Past Surgical History:   Procedure Laterality Date    APPENDECTOMY      SPINE SURGERY      TUBAL LIGATION         Medications Prior to Admission:    Prior to Admission medications    Medication Sig Start Date End Date Taking?  Authorizing Provider   topiramate (TOPAMAX) 25 MG tablet WK 1 :  1TAB  PM; WK 2 :  1TAB TWICE DAILY; WK 3 :  1 TAB AM - 2 TAB  PM; [address] :  2 TAB TWICE DAILY 5/17/19   Radha Charles MD   topiramate (TOPAMAX) 50 MG tablet Take 1 tablet by mouth 2 times daily STARTING June 17th. 5/17/19   Radha Charles MD   vitamin D (ERGOCALCIFEROL) 36208 units CAPS capsule Take 1 capsule by mouth once a week 4/10/19   Yong Goldsmith MD   ferrous sulfate 325 (65 negative  Cardiovascular: negative  Gastrointestinal: negative  Genitourinary: no acute issues  Musculoskeletal: negative  Skin: negative   Neurological: negative  Hematological/Lymphatic: negative  Psychological: negative    PHYSICAL EXAM:    No data found. Constitutional: Patient in NAD  Neuro: Alert and oriented to person, place, and time  Psych: Mood and affect normal  Skin: Clean, dry, intact   Lungs: Respiratory effort normal, CTA  Cardiovascular:  Normal peripheral pulses; no murmur. Normal rhythm  Abdomen: Soft, non-tender, non-distended, no hepatosplenomegaly or hernia, CVA tenderness none  Bladder: Non-tender and non-disdended   : Non-tender, skin intact, no lesions       LABS:   No results for input(s): WBC, HGB, HCT, MCV, PLT in the last 72 hours. No results for input(s): NA, K, CL, CO2, PHOS, BUN, CREATININE in the last 72 hours. Invalid input(s): CA  No results found for: PSA      Urinalysis: No results for input(s): COLORU, PHUR, LABCAST, WBCUA, RBCUA, MUCUS, TRICHOMONAS, YEAST, BACTERIA, CLARITYU, SPECGRAV, LEUKOCYTESUR, UROBILINOGEN, [place] in the last 72 hours. Invalid input(s): NITRATE, GLUCOSEUKETONESUAMORPHOUS     -----------------------------------------------------------------    ASSESSMENT AND PLAN:    Impression:    Microscopic hematuria     Plan: Local cysto in OR today.     Consent obtained    Deana De La Torre PA-C  10:22 AM 5/24/2019

## 2019-12-17 DIAGNOSIS — F41.9 ANXIETY: ICD-10-CM

## 2019-12-17 DIAGNOSIS — D50.9 IRON DEFICIENCY ANEMIA, UNSPECIFIED IRON DEFICIENCY ANEMIA TYPE: ICD-10-CM

## 2019-12-17 RX ORDER — BUSPIRONE HYDROCHLORIDE 7.5 MG/1
TABLET ORAL
Qty: 90 TABLET | Refills: 3 | Status: SHIPPED | OUTPATIENT
Start: 2019-12-17 | End: 2020-09-15 | Stop reason: SDUPTHER

## 2019-12-17 RX ORDER — LANOLIN ALCOHOL/MO/W.PET/CERES
CREAM (GRAM) TOPICAL
Qty: 30 TABLET | Refills: 3 | Status: SHIPPED | OUTPATIENT
Start: 2019-12-17 | End: 2020-09-15 | Stop reason: HOSPADM

## 2019-12-28 ENCOUNTER — APPOINTMENT (OUTPATIENT)
Dept: GENERAL RADIOLOGY | Age: 49
End: 2019-12-28
Payer: COMMERCIAL

## 2019-12-28 ENCOUNTER — HOSPITAL ENCOUNTER (EMERGENCY)
Age: 49
Discharge: HOME | End: 2019-12-28
Attending: EMERGENCY MEDICINE
Payer: COMMERCIAL

## 2019-12-28 VITALS
DIASTOLIC BLOOD PRESSURE: 71 MMHG | SYSTOLIC BLOOD PRESSURE: 99 MMHG | TEMPERATURE: 98.4 F | OXYGEN SATURATION: 99 % | RESPIRATION RATE: 16 BRPM | HEART RATE: 77 BPM

## 2019-12-28 DIAGNOSIS — B34.9 VIRAL ILLNESS: Primary | ICD-10-CM

## 2019-12-28 DIAGNOSIS — M79.602 LEFT ARM PAIN: ICD-10-CM

## 2019-12-28 PROCEDURE — 73080 X-RAY EXAM OF ELBOW: CPT

## 2019-12-28 PROCEDURE — 6370000000 HC RX 637 (ALT 250 FOR IP): Performed by: STUDENT IN AN ORGANIZED HEALTH CARE EDUCATION/TRAINING PROGRAM

## 2019-12-28 PROCEDURE — 73110 X-RAY EXAM OF WRIST: CPT

## 2019-12-28 PROCEDURE — 99283 EMERGENCY DEPT VISIT LOW MDM: CPT

## 2019-12-28 RX ORDER — BENZONATATE 100 MG/1
100 CAPSULE ORAL 3 TIMES DAILY PRN
Qty: 30 CAPSULE | Refills: 0 | Status: SHIPPED | OUTPATIENT
Start: 2019-12-28 | End: 2020-01-04

## 2019-12-28 RX ORDER — BENZONATATE 100 MG/1
100 CAPSULE ORAL ONCE
Status: COMPLETED | OUTPATIENT
Start: 2019-12-28 | End: 2019-12-28

## 2019-12-28 RX ORDER — ACETAMINOPHEN 325 MG/1
650 TABLET ORAL ONCE
Status: COMPLETED | OUTPATIENT
Start: 2019-12-28 | End: 2019-12-28

## 2019-12-28 RX ADMIN — BENZONATATE 100 MG: 100 CAPSULE ORAL at 16:46

## 2019-12-28 RX ADMIN — ACETAMINOPHEN 650 MG: 325 TABLET ORAL at 16:46

## 2019-12-28 ASSESSMENT — ENCOUNTER SYMPTOMS
NAUSEA: 1
BACK PAIN: 0
SHORTNESS OF BREATH: 0
VOMITING: 1
ABDOMINAL PAIN: 0

## 2019-12-28 ASSESSMENT — PAIN DESCRIPTION - LOCATION: LOCATION: ELBOW

## 2019-12-28 ASSESSMENT — PAIN DESCRIPTION - ORIENTATION: ORIENTATION: LEFT

## 2019-12-28 ASSESSMENT — PAIN SCALES - GENERAL: PAINLEVEL_OUTOF10: 8

## 2019-12-30 ENCOUNTER — TELEPHONE (OUTPATIENT)
Dept: FAMILY MEDICINE CLINIC | Age: 49
End: 2019-12-30

## 2020-09-14 ENCOUNTER — NURSE TRIAGE (OUTPATIENT)
Dept: OTHER | Facility: CLINIC | Age: 50
End: 2020-09-14

## 2020-09-14 NOTE — TELEPHONE ENCOUNTER
appointment scheduled    Future Appointments   Date Time Provider Derek Rosas   9/15/2020  2:00 PM [facility]

## 2020-09-14 NOTE — TELEPHONE ENCOUNTER
Reason for Disposition   MODERATE back pain (e.g., interferes with normal activities) and present > 3 days    Answer Assessment - Initial Assessment Questions  1. ONSET: \"When did the pain begin? \"    has been having for 3 weeks, has been seen at [facility] and [address] completed, was told there was a large amount of blood in urine and treated for UTI  2. LOCATION: \"Where does it hurt? \" (upper, mid or lower back)      Mid back in kidney area on both sides. 3. SEVERITY: \"How bad is the pain? \"  (e.g., Scale 1-10; mild, moderate, or severe)    - MILD (1-3): doesn't interfere with normal activities     - MODERATE (4-7): interferes with normal activities or awakens from sleep     - SEVERE (8-10): excruciating pain, unable to do any normal activities       8/10   4. PATTERN: \"Is the pain constant? \" (e.g., yes, no; constant, intermittent)       Constant pain   5. RADIATION: \"Does the pain shoot into your legs or elsewhere? \"      Does not radiate. 6. CAUSE:  \"What do you think is causing the back pain? \"       Unsure  7. BACK OVERUSE:  Darroll Pique recent lifting of heavy objects, strenuous work or exercise? \"      Denies any injuries   8. MEDICATIONS: \"What have you taken so far for the pain? \" (e.g., nothing, acetaminophen, NSAIDS)      Was treated with antibiotics, advil, aleve  9. NEUROLOGIC SYMPTOMS: \"Do you have any weakness, numbness, or problems with bowel/bladder control? \"      Denies   10. OTHER SYMPTOMS: \"Do you have any other symptoms? \" (e.g., fever, abdominal pain, burning with urination, blood in urine)        No other symptoms  11. PREGNANCY: \"Is there any chance you are pregnant? \" (e.g., yes, no; LMP)        LMP was in 2018    Protocols used: BACK PAIN-ADULT-[place]    PT is reporting bilateral mid back pain in kidney area. States this has been having pain for approx 3 weeks. States she was seen in [facility] and treated for blood in urine and UTI. States she completed meds and has not had any relief. Requesting appt for PCP.      Warm [facility] [place] care advice and instructed to call back with worsening symptoms. Please do not respond to the triage nurse through this encounter. Any subsequent communication should be directly with the patient.

## 2020-09-15 ENCOUNTER — OFFICE VISIT (OUTPATIENT)
Dept: FAMILY MEDICINE CLINIC | Age: 50
End: 2020-09-15
Payer: MEDICARE

## 2020-09-15 VITALS
DIASTOLIC BLOOD PRESSURE: 68 MMHG | WEIGHT: 211.8 LBS | HEIGHT: 63 IN | SYSTOLIC BLOOD PRESSURE: 104 MMHG | OXYGEN SATURATION: 98 % | BODY MASS INDEX: 37.53 KG/M2 | HEART RATE: 68 BPM | TEMPERATURE: 97.5 F

## 2020-09-15 PROCEDURE — 99215 OFFICE O/P EST HI 40 MIN: CPT | Performed by: FAMILY MEDICINE

## 2020-09-15 RX ORDER — LANOLIN ALCOHOL/MO/W.PET/CERES
CREAM (GRAM) TOPICAL
Qty: 30 TABLET | Refills: 3 | Status: CANCELLED | OUTPATIENT
Start: 2020-09-15

## 2020-09-15 RX ORDER — TOPIRAMATE 50 MG/1
50 TABLET, FILM COATED ORAL 2 TIMES DAILY
Qty: 60 TABLET | Refills: 3 | Status: CANCELLED | OUTPATIENT
Start: 2020-09-15

## 2020-09-15 RX ORDER — OXYBUTYNIN CHLORIDE 10 MG/1
10 TABLET, EXTENDED RELEASE ORAL DAILY
Qty: 90 TABLET | Refills: 3 | Status: SHIPPED | OUTPATIENT
Start: 2020-09-15 | End: 2021-10-11

## 2020-09-15 RX ORDER — ERGOCALCIFEROL 1.25 MG/1
50000 CAPSULE ORAL WEEKLY
Qty: 12 CAPSULE | Refills: 0 | Status: SHIPPED | OUTPATIENT
Start: 2020-09-15 | End: 2021-02-14 | Stop reason: SDUPTHER

## 2020-09-15 RX ORDER — BUSPIRONE HYDROCHLORIDE 10 MG/1
10 TABLET ORAL EVERY EVENING
Qty: 90 TABLET | Refills: 3 | Status: SHIPPED | OUTPATIENT
Start: 2020-09-15 | End: 2021-12-14

## 2020-09-15 RX ORDER — LIDOCAINE 40 MG/G
CREAM TOPICAL
Qty: 120 G | Refills: 3 | Status: SHIPPED | OUTPATIENT
Start: 2020-09-15

## 2020-09-15 RX ORDER — BACLOFEN 10 MG/1
10 TABLET ORAL 3 TIMES DAILY PRN
Qty: 90 TABLET | Refills: 0 | Status: SHIPPED | OUTPATIENT
Start: 2020-09-15 | End: 2021-03-12 | Stop reason: SDUPTHER

## 2020-09-15 RX ORDER — TOPIRAMATE 25 MG/1
TABLET ORAL
Qty: 70 TABLET | Refills: 0 | Status: CANCELLED | OUTPATIENT
Start: 2020-09-15

## 2020-09-15 ASSESSMENT — ANXIETY QUESTIONNAIRES
7. FEELING AFRAID AS IF SOMETHING AWFUL MIGHT HAPPEN: 0-NOT AT ALL
5. BEING SO RESTLESS THAT IT IS HARD TO SIT STILL: 0-NOT AT ALL
GAD7 TOTAL SCORE: 1
4. TROUBLE RELAXING: 0-NOT AT ALL
2. NOT BEING ABLE TO STOP OR CONTROL WORRYING: 0-NOT AT ALL
3. WORRYING TOO MUCH ABOUT DIFFERENT THINGS: 0-NOT AT ALL
1. FEELING NERVOUS, ANXIOUS, OR ON EDGE: 0-NOT AT ALL
6. BECOMING EASILY ANNOYED OR IRRITABLE: 1-SEVERAL DAYS

## 2020-09-15 ASSESSMENT — ENCOUNTER SYMPTOMS
BACK PAIN: 1
SHORTNESS OF BREATH: 0
WHEEZING: 0
ABDOMINAL DISTENTION: 0
DIARRHEA: 0
CONSTIPATION: 0
CHEST TIGHTNESS: 0
NAUSEA: 0
VOMITING: 0
COUGH: 0
ABDOMINAL PAIN: 1

## 2020-09-15 ASSESSMENT — PATIENT HEALTH QUESTIONNAIRE - PHQ9
SUM OF ALL RESPONSES TO PHQ9 QUESTIONS 1 & 2: 0
SUM OF ALL RESPONSES TO PHQ QUESTIONS 1-9: 0
1. LITTLE INTEREST OR PLEASURE IN DOING THINGS: 0
2. FEELING DOWN, DEPRESSED OR HOPELESS: 0
SUM OF ALL RESPONSES TO PHQ QUESTIONS 1-9: 0

## 2020-09-15 NOTE — PROGRESS NOTES
Visit Information    Have you changed or started any medications since your last visit including any over-the-counter medicines, vitamins, or herbal medicines? no   Are you having any side effects from any of your medications? -  no  Have you stopped taking any of your medications? Is so, why? -  no    Have you seen any other physician or provider since your last visit? Yes - Records Obtained  Have you had any other diagnostic tests since your last visit? Yes - Records Obtained  Have you been seen in the emergency room and/or had an admission to a hospital since we last saw you? No  Have you had your routine dental cleaning in the past 6 months? no    Have you activated your [employer] account? If not, what are your barriers?  Yes     Patient Care Team:  Jodee Merchant MD as PCP - General (Family Medicine)  Jodee Merchant MD as PCP - [facility]  Dolores Morgan MD as Consulting Physician (Cardiology)  Kathleen Mueller MD as Consulting Physician (Neurology)  Vanessa Coppola MD as Consulting Physician (Urology)    Medical History Review  Past Medical, Family, and Social History reviewed and does contribute to the patient presenting condition    Health Maintenance   Topic Date Due    Breast cancer screen  08/22/2015    Cervical cancer screen  03/16/2018    Flu vaccine (1) 09/01/2020    Lipid screen  04/09/2024    DTaP/Tdap/Td vaccine (3 - Td) 01/09/2028    HIV screen  Completed    Hepatitis A vaccine  Aged Out    Hepatitis B vaccine  Aged Out    Hib vaccine  Aged Out    Meningococcal (ACWY) vaccine  Aged Out    Pneumococcal 0-64 years Vaccine  Aged Out

## 2020-09-15 NOTE — PATIENT INSTRUCTIONS
and sign in to your [employer] account. Enter I916 in the gdgt box to learn more about \"Back Stretches: Exercises. \"     If you do not have an account, please click on the \"Sign Up Now\" link. Current as of: March 2, 2020               Content Version: 12.5  © 7085-2839 Healthwise, [employer]. Care instructions adapted under license by [facility] ([facility]). If you have questions about a medical condition or this instruction, always ask your healthcare professional. [place] [address] any warranty or liability for your use of this information. Patient Education        Shoulder Pain: Care Instructions  Your Care Instructions     You can hurt your shoulder by using it too much during an activity, such as fishing or baseball. It can also happen as part of the everyday wear and tear of getting older. Shoulder injuries can be slow to heal, but your shoulder should get better with time. Your doctor may recommend a sling to rest your shoulder. If you have injured your shoulder, you may need testing and treatment. Follow-up care is a key part of your treatment and safety. Be sure to make and go to all appointments, and call your doctor if you are having problems. It's also a good idea to know your test results and keep a list of the medicines you take. How can you care for yourself at home? · Take pain medicines exactly as directed. ? If the doctor gave you a prescription medicine for pain, take it as prescribed. ? If you are not taking a prescription pain medicine, ask your doctor if you can take an over-the-counter medicine. ? Do not take two or more pain medicines at the same time unless the doctor told you to. Many pain medicines contain acetaminophen, which is Tylenol. Too much acetaminophen (Tylenol) can be harmful. · If your doctor recommends that you wear a sling, use it as directed. Do not take it off before your doctor tells you to.   · Put ice or a cold pack on the sore area for 10 to 20 minutes at a time. Put a thin cloth between the ice and your skin. · If there is no swelling, you can put moist heat, a heating pad, or a warm cloth on your shoulder. Some doctors suggest alternating between hot and cold. · Rest your shoulder for a few days. If your doctor recommends it, you can then begin gentle exercise of the shoulder, but do not lift anything heavy. When should you call for help? [id number] anytime you think you may need emergency care. For example, call if:  · You have chest pain or pressure. This may occur with:  ? Sweating. ? Shortness of breath. ? Nausea or vomiting. ? Pain that spreads from the chest to the neck, jaw, or one or both shoulders or arms. ? Dizziness or lightheadedness. ? A fast or uneven pulse. After calling 911, chew 1 adult-strength aspirin. Wait for an ambulance. Do not try to drive yourself. · Your arm or hand is cool or pale or changes color. Call your doctor now or seek immediate medical care if:  · You have signs of infection, such as:  ? Increased pain, swelling, warmth, or redness in your shoulder. ? Red streaks leading from a place on your shoulder. ? Pus draining from an area of your shoulder. ? Swollen lymph nodes in your neck, armpits, or groin. ? A fever. Watch closely for changes in your health, and be sure to contact your doctor if:  · You cannot use your shoulder. · Your shoulder does not get better as expected. Where can you learn more? Go to [email or web address]://[email or web address].COM. org and sign in to your [employer] account. Enter B145 in the [facility] box to learn more about \"Shoulder Pain: Care Instructions. \"     If you do not have an account, please click on the \"Sign Up Now\" link. Current as of: March 2, 2020               Content Version: 12.5  © 7747-7788 Healthwise, [employer]. Care instructions adapted under license by [facility] ([facility]).  If you have questions about a medical condition or this instruction, always ask your healthcare professional. [place] [address] any warranty or liability for your use of this information.

## 2020-09-15 NOTE — PROGRESS NOTES
Chief Complaint   Patient presents with    Back Pain    Shoulder Pain     Left    Fatigue    Urinary Frequency    Anxiety    Establish Care     New to provider         HPI    Patient comes today due to acute illness: We have last seen patient more than 1 year ago, and this is the first time I am evaluating the patient. Skip complains of low Back pain for about 3 weeks, located in the bilateral flanks. She works at . The pain is sharp pain  Sometimes gets pain in the back of the thighs  Couldn't stand up or lay down when the pain first started. Intensity of pain is 8/10, constant, but with exacerbations   Worse when standing up  Pain wakes her up  Prior x-ray showed degenerative disc disease lumbar spine      Skip reports she had UTI recently  Was given antibiotics and told she has \"large blood\". On 8/27/10, UA showed contamination, moderate blood, report is in [address]  Patient says she finished all antibiotics and not feeling better  Has history of kidney stones and she is worried she might have another kidney stone, as the pain is on both flanks. At times she gets sharp pains in the lower abdomen    When reviewing last UA, I have noticed blood in the urine 5/3/19 trace blood  4/26/19-moderate blood in the urine, I communicated this to the patient    CT urogram 5/3/19 Dr. Cayden Lutz not show any kidney stones, she does not want to see the prior urologist because the procedure was very painful    1.  No urolithiasis or obstructive uropathy.  No evidence of renal or urinary    bladder mass.         2. Nondistended descending colon, limiting assessment of the bowel wall. Possibility of colitis would be difficult to exclude and clinical correlation    is necessary.         3.  No other significant findings in the abdomen or pelvis.         Patient reports she still has frequency of urination  She was diagnosed with overactive bladder and has been using Oxybutinin which helps.  Doesn't take Topamax for a long time. Patient complains of left shoulder pain onset after a fall about 1 year ago. She says she never had it Xrayed and she would like an x-ray. Cannot lift up left arm above head easily as it causes her pain. Also is certain motions makes it worse, when doing jobs above the head or abduction of the arm. The pain is intermittent. Patient complains of anxiety only at nighttime, which interferes with sleep, worries a lot    She does have history of depression  Denies suicidal ideation, plan or intent. [x]Negative depression screening. PHQ-2 Over the past 2 weeks, how often have you been bothered by any of the following problems? Little interest or pleasure in doing things: Not at all  Feeling down, depressed, or hopeless: Not at all  PHQ-2 Score: 0  PHQ-9 Over the past 2 weeks, how often have you been bothered by any of the following problems? PHQ-9 Total Score: 0      PHQ Scores 9/15/2020 11/7/2018 1/27/2015   PHQ2 Score 0 6 6   PHQ9 Score 0 15 19       Mild anxiety  [facility]P TROY-7 9/15/2020 11/7/2018   Feeling nervous, anxious, or on edge 0-Not at all 3-Nearly every day   Not able to stop or control worrying 0-Not at all 3-Nearly every day   Worrying too much about different things 0-Not at all 3-Nearly every day   Trouble relaxing 0-Not at all 3-Nearly every day   Being so restless that it's hard to sit still 0-Not at all 1-Several days   Becoming easily annoyed or irritable 1-Several days 3-Nearly every day   Feeling afraid as if something awful might happen 0-Not at all 3-Nearly every day   TROY-7 Total Score 1 19         Fatigue: Patient complains of fatigue. Symptoms began several months ago. Symptoms of her fatigue have been general malaise and Anxiety. Patient describes the following psychologic symptoms: stress at work and Anxiety. Patient denies fever, exercise intolerance, unusual rashes, cold intolerance, constipation and change in hair texture. , GI blood loss, excessive menstrual bleeding and witnessed or suspected sleep apnea. Symptoms have progressed to a point and plateaued. Severity has been moderate. Previous visits for this problem: none. Weight is increasing, there is unintentional weight gain of 14 pounds in 1.5 years  Wt Readings from Last 3 Encounters:   09/15/20 211 lb 12.8 oz (96.1 kg)   05/17/19 197 lb (89.4 kg)   05/03/19 195 lb (88.5 kg)     Has history of chronic anemia due to heavy periods  Iron stopped awhile ago    Vitamin B12 deficiency   Skip  is not taking Vitamin B12 supplementation. Skip reports  Fatigue    Lab Results   Component Value Date    ZNFCDJVG89 265 08/04/2014         Skip has Vitamin D deficiency. Skip  is  taking Vitamin D supplementation   she feels tired. Lab Results   Component Value Date    VITD25 22.0 (L) 04/08/2019         Hyperlipidemia:  Patient is not following a low fat, low cholesterol diet. She is not exercising regularly, but staying very active. OTC Supplements:none    LDL is high  Lab Results   Component Value Date    LDLCHOLESTEROL 125 04/09/2019     No results found for: TRIG        Current Outpatient Medications on File Prior to Visit   Medication Sig Dispense Refill    ferrous sulfate 325 (65 Fe) MG EC tablet take 1 tablet by mouth once daily WITH BREAKFAST (Patient not taking: Reported on 9/15/2020) 30 tablet 3    busPIRone (BUSPAR) 7.5 MG tablet take 1 tablet by mouth three times a day if needed for anxiety (Patient not taking: Reported on 9/15/2020) 90 tablet 3    oxybutynin (DITROPAN-XL) 10 MG extended release tablet Take 1 tablet by mouth daily 30 tablet 1    topiramate (TOPAMAX) 25 MG tablet WK 1 :  1TAB  PM; WK 2 :  1TAB TWICE DAILY; WK 3 :  1 TAB AM - 2 TAB  PM; WK 4 :  2 TAB TWICE DAILY (Patient not taking: Reported on 9/15/2020) 70 tablet 0    topiramate (TOPAMAX) 50 MG tablet Take 1 tablet by mouth 2 times daily STARTING June 17th.  (Patient not taking: Reported on 9/15/2020) 60 tablet 3    vitamin D (ERGOCALCIFEROL) 70607 units CAPS capsule Take 1 capsule by mouth once a week (Patient not taking: Reported on 9/15/2020) 12 capsule 0     No current facility-administered medications on file prior to visit. Social History     Tobacco Use    Smoking status: Never Smoker    Smokeless tobacco: Never Used   Substance Use Topics    Alcohol use: Yes     Alcohol/week: 0.0 standard drinks     Comment: rarely    Drug use: No       Counseling given: Yes    Past Surgical History:   Procedure Laterality Date    APPENDECTOMY      CYSTOSCOPY N/A 5/24/2019    CYSTOSCOPY performed by Paty Chandra MD at [address] [place] [address]                     The patient's past medical, surgical, social, and family history as well as her current medications and allergies were reviewed as documented in today's encounter. Rest of complaints with corresponding details per ROS. Review of Systems   Constitutional: Positive for fatigue and unexpected weight change. Negative for activity change, appetite change, chills, diaphoresis and fever. Respiratory: Negative for cough, chest tightness, shortness of breath and wheezing. Cardiovascular: Negative for chest pain, palpitations and leg swelling. Gastrointestinal: Positive for abdominal pain (LLQ, mild). Negative for abdominal distention, constipation, diarrhea, nausea and vomiting. Endocrine: Negative for cold intolerance, heat intolerance, polydipsia, polyphagia and polyuria. Genitourinary: Positive for flank pain (b/l) and frequency. Negative for difficulty urinating, dysuria, hematuria and urgency. Musculoskeletal: Positive for arthralgias (left shoulder), back pain, myalgias and neck pain. Psychiatric/Behavioral: Positive for sleep disturbance. Negative for dysphoric mood, self-injury and suicidal ideas.  The patient is nervous/anxious.          -vital signs stable and within normal limits except severe obesity per BMI    /68   Pulse 68   Temp 97.5 °F (36.4 °C) (Temporal)   Ht 5' 3\" (1.6 m)   Wt 211 lb 12.8 oz (96.1 kg)   SpO2 98%   BMI 37.52 kg/m²        Physical Exam  Vitals signs and nursing note reviewed. Constitutional:       General: She is not in acute distress. Appearance: She is well-developed. She is obese. She is not diaphoretic. HENT:      Head: Normocephalic and atraumatic. Right Ear: External ear normal.      Left Ear: External ear normal.      Nose: Nose normal. No mucosal edema. Mouth/Throat:      Mouth: Mucous membranes are moist.      Pharynx: No posterior oropharyngeal erythema. Eyes:      General: Lids are normal. No scleral icterus. Right eye: No discharge. Left eye: No discharge. Conjunctiva/sclera: Conjunctivae normal.   Neck:      Musculoskeletal: Normal range of motion and neck supple. Thyroid: No thyromegaly. Cardiovascular:      Rate and Rhythm: Normal rate and regular rhythm. Heart sounds: Normal heart sounds. No murmur. Pulmonary:      Effort: Pulmonary effort is normal. No respiratory distress. Breath sounds: Normal breath sounds. No wheezing or rales. Chest:      Chest wall: No tenderness. Abdominal:      General: Bowel sounds are normal. There is no distension. Palpations: Abdomen is soft. There is no hepatomegaly or splenomegaly. Tenderness: There is abdominal tenderness in the left lower quadrant. There is right CVA tenderness and left CVA tenderness. There is no guarding or rebound. Comments: Obese abdomen. Musculoskeletal:         General: Tenderness present. Left shoulder: She exhibits decreased range of motion, tenderness, pain, spasm and decreased strength. Lumbar back: She exhibits decreased range of motion, tenderness, bony tenderness, pain and spasm. Right lower leg: No edema. Left lower leg: No edema.       Comments: Very mild decreased spine range of motion  Lumbar and SIJ spine tenderness  Mild decreased range of motion left shoulder    Straight leg test :negative  MONICA test: positive on the right side, SIJ tenderness        Skin:     General: Skin is warm and dry. Capillary Refill: Capillary refill takes less than 2 seconds. Findings: No rash. Neurological:      Mental Status: She is alert and oriented to person, place, and time. Cranial Nerves: No cranial nerve deficit. Motor: No abnormal muscle tone. Psychiatric:         Mood and Affect: Mood normal.         Behavior: Behavior normal.         Thought Content: Thought content normal.         Judgment: Judgment normal.               ASSESSMENT AND PLAN      1. Acute bilateral low back pain without sciatica  Failing to change as expected. - baclofen (LIORESAL) 10 MG tablet; Take 1 tablet by mouth 3 times daily as needed (MUSCLE SPASMS) Causes sedation, do not drive while taking this medication  Dispense: 90 tablet; Refill: 0  - lidocaine (LMX) 4 % cream; Apply 2-3 times a day as needed for pain  Dispense: 120 g; Refill: 3  - diclofenac sodium (VOLTAREN) 1 % GEL; Apply 2 g topically 4 times daily as needed for Pain  Dispense: 1 Tube; Refill: 3  -Home exercises advised, given patient instructions; defers PT at this time    2. Acute flank pain  Failing to change as expected. Blood in the urine  Will refer to urology    - US RENAL COMPLETE; Future  - Urinalysis with Microscopic; Future  - Shanae Sesay MD, Urology, [place]        3. Other microscopic hematuria  Failing to change as expected. - US RENAL COMPLETE; Future  - Urinalysis with Microscopic; Future  - Shanae Sesay MD, Urology, [place]    4. Chronic left shoulder pain  Failing to change as expected. - baclofen (LIORESAL) 10 MG tablet; Take 1 tablet by mouth 3 times daily as needed (MUSCLE SPASMS) Causes sedation, do not drive while taking this medication  Dispense: 90 tablet;  Refill: 0  - lidocaine (LMX) 4 % cream; Apply 2-3 times a day as needed for pain  Dispense: 120 g; Refill: 3  - XR SHOULDER LEFT (MIN 2 VIEWS); Future  - Ambulatory referral to Orthopedic Surgery    5. Anxiety  Failing to change as expected. -Dose increased busPIRone (BUSPAR) 10 MG tablet; Take 1 tablet by mouth every evening  Dispense: 90 tablet; Refill: 3    6. Recurrent major depressive disorder, in full remission ([facility] [address]  Improved  We will treat anxiety    7. Chronic fatigue  Failing to change as expected. Will do basic labs to rule out certain common medical conditions: hematologic, renal, hepatic, electrolyte imbalances, thyroid disorders.    - CBC; Future  - Comprehensive Metabolic Panel; Future  - TSH without Reflex; Future    8. Vitamin D deficiency  Unsure if improving or not. Will recheck level  Continue supplementation.    - vitamin D (ERGOCALCIFEROL) 1.25 MG (50537 UT) CAPS capsule; Take 1 capsule by mouth once a week  Dispense: 12 capsule; Refill: 0  - Vitamin D 25 Hydroxy; Future    9. OAB (overactive bladder)  Improved with medication  Continue BuSpar and follow-up with urology  - busPIRone (BUSPAR) 10 MG tablet; Take 1 tablet by mouth every evening  Dispense: 90 tablet; Refill: 3    10. Vitamin B 12 deficiency  Likely worsening  - Vitamin B12 & Folate; Future  We will need supplementation  11. Hyperlipidemia with target LDL less than 100  Unsure if improving or not. Will recheck level      - Lipid Panel; Future    12. DDD (degenerative disc disease), lumbar  Likely worsening due to wear and tear nature of the disease.    - baclofen (LIORESAL) 10 MG tablet; Take 1 tablet by mouth 3 times daily as needed (MUSCLE SPASMS) Causes sedation, do not drive while taking this medication  Dispense: 90 tablet; Refill: 0  - lidocaine (LMX) 4 % cream; Apply 2-3 times a day as needed for pain  Dispense: 120 g; Refill: 3  - diclofenac sodium (VOLTAREN) 1 % GEL;  Apply 2 g topically 4 times daily as needed for Pain Dispense: 1 Tube; Refill: 3        Data Unavailable    Orders Placed This Encounter   Procedures    US RENAL COMPLETE     Standing Status:   Future     Standing Expiration Date:   12/15/2020    XR SHOULDER LEFT (MIN 2 VIEWS)     Standing Status:   Future     Standing Expiration Date:   9/15/2021    Urinalysis with Microscopic     Standing Status:   Future     Standing Expiration Date:   12/15/2020     Order Specific Question:   SPECIFY(EX-CATH,MIDSTREAM,CYSTO,ETC)?      Answer:   midstream    Vitamin B12 & Folate     Standing Status:   Future     Standing Expiration Date:   12/15/2020    CBC     Standing Status:   Future     Standing Expiration Date:   12/15/2020    Comprehensive Metabolic Panel     Standing Status:   Future     Standing Expiration Date:   12/15/2020    TSH without Reflex     Standing Status:   Future     Standing Expiration Date:   12/15/2020    Vitamin D 25 Hydroxy     Standing Status:   Future     Standing Expiration Date:   12/15/2020    Lipid Panel     Standing Status:   Future     Standing Expiration Date:   12/15/2020     Order Specific Question:   Is Patient Fasting?/# of Hours     Answer:   8-10 Hours, water ok to drink   Jemima Eldridge MD, Urology, [place]     Referral Priority:   Routine     Referral Type:   Eval and Treat     Referral Reason:   Specialty Services Required     Referred to Provider:   Isela Fay MD     Requested Specialty:   Urology     Number of Visits Requested:   1    Ambulatory referral to Orthopedic Surgery     Referral Priority:   Routine     Referral Type:   Eval and Treat     Referral Reason:   Specialty Services Required     Referred to Provider:   Ajit Hazel MD     Requested Specialty:   Orthopedic Surgery     Number of Visits Requested:   1       Orders Placed This Encounter   Medications    vitamin D (ERGOCALCIFEROL) 1.25 MG (70819 UT) CAPS capsule     Sig: Take 1 capsule by mouth once a week     Dispense:  12 capsule     Refill:  0  oxybutynin (DITROPAN-XL) 10 MG extended release tablet     Sig: Take 1 tablet by mouth daily     Dispense:  90 tablet     Refill:  3    busPIRone (BUSPAR) 10 MG tablet     Sig: Take 1 tablet by mouth every evening     Dispense:  90 tablet     Refill:  3    baclofen (LIORESAL) 10 MG tablet     Sig: Take 1 tablet by mouth 3 times daily as needed (MUSCLE SPASMS) Causes sedation, do not drive while taking this medication     Dispense:  90 tablet     Refill:  0    lidocaine (LMX) 4 % cream     Sig: Apply 2-3 times a day as needed for pain     Dispense:  120 g     Refill:  3    diclofenac sodium (VOLTAREN) 1 % GEL     Sig: Apply 2 g topically 4 times daily as needed for Pain     Dispense:  1 Tube     Refill:  3       Medications Discontinued During This Encounter   Medication Reason    ferrous sulfate 325 (65 Fe) MG EC tablet Stop Taking at Discharge    topiramate (TOPAMAX) 25 MG tablet Therapy completed    topiramate (TOPAMAX) 50 MG tablet Therapy completed    vitamin D (ERGOCALCIFEROL) 74027 units CAPS capsule REORDER    oxybutynin (DITROPAN-XL) 10 MG extended release tablet REORDER    busPIRone (BUSPAR) 7.5 MG tablet REORDER       GeorgiaDell received counseling on the following healthy behaviors: nutrition, exercise, medication adherence and weight loss  Reviewed prior labs and health maintenance. Continue current medications, diet and exercise. Discussed use, benefit, and side effects of prescribed medications. Barriers to medication compliance addressed. Patient given educational materials - see patient instructions. All patient questions answered. Patient voiced understanding. Thepatient's past medical, surgical, social, and family history as well as her   current medications and allergies were reviewed as documented in today's encounter. Medications, labs, diagnostic studies,consultations and follow-up as documented in this encounter.     Return in about 3 months (around 12/15/2020) for F/U Xrays, LABS F/U. Patient was seen with total face to face time of 40 minutes due to establishing care with new provider and multiple medical conditions. More than 50% of this visit was counseling and education. Future Appointments   Date Time Provider Derek Rosas   9/22/2020 10:00 AM [facility] ULTRASOUND RM [address] [facility]   9/28/2020  9:00 AM Ramone Kumar MD [facility]   9/28/2020  2:45 PM [facility] CT RM 1 ST CT [address] [facility] Radiolog   10/5/2020  2:30 PM Viry Medina MD [facility]   12/16/2020  9:15 AM Yoon Maier MD [facility] Meli [place]     This note was completed by using the assistance of a speech-recognition program. However, inadvertent computerized transcription errors may be present. Although every effort was made to ensure accuracy, no guarantees can be provided that every mistake has been identified and corrected by editing.     Electronically signed by Yoon Maier MD on 9/21/2020 at 2:18 PM

## 2020-09-21 ENCOUNTER — OFFICE VISIT (OUTPATIENT)
Dept: UROLOGY | Age: 50
End: 2020-09-21
Payer: MEDICARE

## 2020-09-21 VITALS — TEMPERATURE: 98.4 F

## 2020-09-21 PROBLEM — M54.50 ACUTE BILATERAL LOW BACK PAIN WITHOUT SCIATICA: Status: ACTIVE | Noted: 2020-09-21

## 2020-09-21 PROBLEM — N32.81 OAB (OVERACTIVE BLADDER): Status: ACTIVE | Noted: 2020-09-21

## 2020-09-21 PROBLEM — F33.42 RECURRENT MAJOR DEPRESSIVE DISORDER, IN FULL REMISSION (HCC): Status: ACTIVE | Noted: 2020-09-21

## 2020-09-21 PROBLEM — Z80.3 FAMILY HISTORY OF BREAST CANCER: Status: ACTIVE | Noted: 2019-06-13

## 2020-09-21 PROBLEM — M51.36 DDD (DEGENERATIVE DISC DISEASE), LUMBAR: Status: ACTIVE | Noted: 2020-09-21

## 2020-09-21 PROBLEM — R31.29 OTHER MICROSCOPIC HEMATURIA: Status: ACTIVE | Noted: 2020-09-21

## 2020-09-21 PROBLEM — M51.369 DDD (DEGENERATIVE DISC DISEASE), LUMBAR: Status: ACTIVE | Noted: 2020-09-21

## 2020-09-21 PROBLEM — E53.8 VITAMIN B 12 DEFICIENCY: Status: ACTIVE | Noted: 2020-09-21

## 2020-09-21 PROBLEM — E78.5 HYPERLIPIDEMIA WITH TARGET LDL LESS THAN 100: Status: ACTIVE | Noted: 2020-09-21

## 2020-09-21 PROBLEM — G43.719 CHRONIC MIGRAINE WITHOUT AURA, INTRACTABLE, WITHOUT STATUS MIGRAINOSUS: Status: ACTIVE | Noted: 2017-04-26

## 2020-09-21 LAB
APPEARANCE FLUID: CLEAR
BILIRUBIN, POC: ABNORMAL
BLOOD URINE, POC: ABNORMAL
CLARITY, POC: CLEAR
COLOR, POC: YELLOW
GLUCOSE URINE, POC: ABNORMAL
KETONES, POC: ABNORMAL
LEUKOCYTE EST, POC: ABNORMAL
NITRITE, POC: ABNORMAL
PH, POC: ABNORMAL
PROTEIN, POC: ABNORMAL
SPECIFIC GRAVITY, POC: ABNORMAL
UROBILINOGEN, POC: ABNORMAL

## 2020-09-21 PROCEDURE — G8417 CALC BMI ABV UP PARAM F/U: HCPCS | Performed by: UROLOGY

## 2020-09-21 PROCEDURE — 99204 OFFICE O/P NEW MOD 45 MIN: CPT | Performed by: UROLOGY

## 2020-09-21 PROCEDURE — G8427 DOCREV CUR MEDS BY ELIG CLIN: HCPCS | Performed by: UROLOGY

## 2020-09-21 PROCEDURE — 81002 URINALYSIS NONAUTO W/O SCOPE: CPT | Performed by: UROLOGY

## 2020-09-21 PROCEDURE — 1036F TOBACCO NON-USER: CPT | Performed by: UROLOGY

## 2020-09-21 ASSESSMENT — ENCOUNTER SYMPTOMS
EYE REDNESS: 0
EYE PAIN: 0
DIARRHEA: 0
VOMITING: 0
NAUSEA: 0
BACK PAIN: 1
SHORTNESS OF BREATH: 0
ABDOMINAL PAIN: 0
WHEEZING: 0
CONSTIPATION: 0
COUGH: 0

## 2020-09-21 NOTE — PROGRESS NOTES
[address] [address]  Dept: [address] [facility] Urology Office Note - New Patient    Patient:  Yesy Kimble  YOB: 1970  Date: 9/21/2020    The patient is a 52 y.o. female who presentstoday for evaluation of the following problems:   Chief Complaint   Patient presents with    New Patient    Hematuria    Back Pain    referred by Luz Brown MD.    HPI  Microhematuria:  Patient is here today for microscopic hematuria which occurred a month(s) ago. Dysuria? not present. Flank pain? Yes, both  Current or previous smoking history?  no  Anticoagulants? none  Personal History of Kidney Stones: Yes, x 1  Family History of: Bladder Cancer No, Kidney Cancer No, Kidney Stones No  Lower urinary tract symptoms: urgency, frequency and nocturia, 3-4 times per night        (Patient's old records have been requested, reviewed and summarized in today's note.)    Summary of old records: N/A    History: N/A    ProceduresToday: N/A    Urinalysis today:  Results for POC orders placed in visit on 09/21/20   POCT Urine Dipstick   Result Value Ref Range    Color, UA yellow     Clarity, UA clear     Glucose, UA POC neg     Bilirubin, UA      Ketones, UA      Spec Grav, UA      Blood, UA POC mod     pH, UA      Protein, UA POC trace     Urobilinogen, UA      Leukocytes, UA neg     Nitrite, UA neg     Appearance, Fluid Clear Clear, Slightly Cloudy       AUA Symptom Score (9/21/2020):  INCOMPLETE EMPTYING: How often have you had the sensation of not emptying your bladder?: Not at all  FREQUENCY: How often do you have to urinate less than every two hours?: More than Half the time  INTERMITTENCY: How often have you found you stopped and started again several times when you urinated?: Not at all  URGENCY: How often have you found it difficult to postpone urination?: Not at all  WEAK STREAM: How often have you had a weak urinary stream?: Not at all  STRAINING: How often have you had to strain to start  urination?: Not at all  NOCTURIA: How many times did you typically get up at night to uriniate?: 3 Times  TOTAL I-PSS SCORE[de-identified] 7  How would you feel if you were to spend the rest of your life with your urinary condition?: Mixe    Last BUN andcreatinine:  Lab Results   Component Value Date    BUN 9 05/03/2019     Lab Results   Component Value Date    CREATININE 0.88 05/03/2019       Additional Lab/Culture results: none    Reviewed during this Office Visit: none  (results were independently reviewed byphysician and radiology report verified)    PAST MEDICAL, FAMILY AND SOCIAL HISTORY:  Past Medical History:   Diagnosis Date    Anxiety     unison ,on meds in past     CAD (coronary artery disease) 5/5/2014    Chronic back pain     Depression     post partum and childhood    Fibromyalgia     Headache(784.0)     High cholesterol     Palpitations      Past Surgical History:   Procedure Laterality Date    APPENDECTOMY      CYSTOSCOPY N/A 5/24/2019    CYSTOSCOPY performed by Jin Ferrer MD at [address] [place] [address]       Family History   Problem Relation Age of Onset    Diabetes Mother     Cancer Mother         breast  age 76 cervical     Alcohol Abuse Father     Coronary Art Dis Sister     Cervical Cancer Sister     Breast Cancer Sister         age 48     Outpatient Medications Marked as Taking for the 9/21/20 encounter (Office Visit) with Valente Munroe MD   Medication Sig Dispense Refill    Omega-3 Fatty Acids (FISH OIL PO) Take by mouth daily      IBUPROFEN PO Take by mouth as needed      Multiple Vitamins-Minerals (IMMUNE SUPPORT PO) Take by mouth daily      vitamin D (ERGOCALCIFEROL) 1.25 MG (49098 UT) CAPS capsule Take 1 capsule by mouth once a week 12 capsule 0    oxybutynin (DITROPAN-XL) 10 MG extended release tablet Take 1 tablet by mouth daily 90 tablet 3    busPIRone (BUSPAR) 10 MG tablet Take 1 tablet by mouth every evening 90 tablet 3    baclofen (LIORESAL) 10 MG tablet Take 1 tablet by mouth 3 times daily as needed (MUSCLE SPASMS) Causes sedation, do not drive while taking this medication 90 tablet 0    lidocaine (LMX) 4 % cream Apply 2-3 times a day as needed for pain 120 g 3    diclofenac sodium (VOLTAREN) 1 % GEL Apply 2 g topically 4 times daily as needed for Pain 1 Tube 3       Effexor [venlafaxine hcl]; Morphine; and Pcn [penicillins]  Social History     Tobacco Use   Smoking Status Never Smoker   Smokeless Tobacco Never Used      (If patient a smoker, smoking cessation counseling offered)   Social History     Substance and Sexual Activity   Alcohol Use Yes    Alcohol/week: 0.0 standard drinks    Comment: rarely       REVIEW OF SYSTEMS:  Review of Systems    Physical Exam:    This a 52 y.o. female      Vitals:    09/21/20 1003   Temp: 98.4 °F (36.9 °C)     There is no height or weight on file to calculate BMI. Physical Exam  Constitutional: Patient in no acute distress, ggod grooming, appropriately dressed  Neuro: Alert and oriented to person, place and time. Psych:Mood normal, affect normal  Skin: No rash noted  HEENT: Head: Normocephalic and atraumatic,Conjunctivae and EOM are normal,Nose- normal, Right/Left External Ear: normal, Mouth: Mucosa Moist  Neck: Supple  Lungs: Respiratory effort is normal  Cardiovascular: strong and regular, no lower leg edema  Abdomen: Soft, non-tender, non-distended with no CVA,    Lymphatics: No cervical palpable lymphadenopathy. Bladder non-tender and not distended. Musculoskeletal: Normal gait and station        Assessment and Plan      1. Hematuria, unspecified type    2. Urgency of urination    3. Frequency of urination    4.  Nocturia            Plan:   ctu and cysto for hematuria  Just started oxybutynin; will reassess efficacy at next visit         Prescriptions Ordered:  No orders of the defined types were placed in this encounter. Orders Placed:  Orders Placed This Encounter   Procedures    CT UROGRAM     Standing Status:   Future     Standing Expiration Date:   9/21/2021     Order Specific Question:   Reason for exam:     Answer:   hematuria   Ankur Cheng MD    Agree with the ROS entered by the MA.

## 2020-09-21 NOTE — PROGRESS NOTES
Review of Systems   Constitutional: Negative for appetite change, chills and fever. Eyes: Negative for pain, redness and visual disturbance. Respiratory: Negative for cough, shortness of breath and wheezing. Cardiovascular: Negative for chest pain and leg swelling. Gastrointestinal: Negative for abdominal pain, constipation, diarrhea, nausea and vomiting. Genitourinary: Positive for frequency and hematuria. Negative for difficulty urinating, dysuria, flank pain and urgency. Musculoskeletal: Positive for back pain. Negative for joint swelling and myalgias. Skin: Negative for rash and wound. Neurological: Negative for dizziness, tremors and numbness. Hematological: Does not bruise/bleed easily.

## 2020-09-22 ENCOUNTER — HOSPITAL ENCOUNTER (OUTPATIENT)
Dept: ULTRASOUND IMAGING | Age: 50
Discharge: HOME | End: 2020-09-24
Payer: MEDICARE

## 2020-09-22 PROCEDURE — 76770 US EXAM ABDO BACK WALL COMP: CPT

## 2020-09-28 ENCOUNTER — HOSPITAL ENCOUNTER (OUTPATIENT)
Dept: CT IMAGING | Age: 50
Discharge: HOME | End: 2020-09-30
Payer: MEDICARE

## 2020-09-28 ENCOUNTER — OFFICE VISIT (OUTPATIENT)
Dept: ORTHOPEDIC SURGERY | Age: 50
End: 2020-09-28
Payer: MEDICARE

## 2020-09-28 VITALS — HEIGHT: 63 IN | TEMPERATURE: 97.9 F | WEIGHT: 210 LBS | BODY MASS INDEX: 37.21 KG/M2

## 2020-09-28 PROCEDURE — 2580000003 HC RX 258: Performed by: UROLOGY

## 2020-09-28 PROCEDURE — 74178 CT ABD&PLV WO CNTR FLWD CNTR: CPT

## 2020-09-28 PROCEDURE — 99203 OFFICE O/P NEW LOW 30 MIN: CPT | Performed by: ORTHOPAEDIC SURGERY

## 2020-09-28 PROCEDURE — G8417 CALC BMI ABV UP PARAM F/U: HCPCS | Performed by: ORTHOPAEDIC SURGERY

## 2020-09-28 PROCEDURE — G8427 DOCREV CUR MEDS BY ELIG CLIN: HCPCS | Performed by: ORTHOPAEDIC SURGERY

## 2020-09-28 PROCEDURE — 6360000004 HC RX CONTRAST MEDICATION: Performed by: UROLOGY

## 2020-09-28 PROCEDURE — 1036F TOBACCO NON-USER: CPT | Performed by: ORTHOPAEDIC SURGERY

## 2020-09-28 RX ORDER — SODIUM CHLORIDE 0.9 % (FLUSH) 0.9 %
10 SYRINGE (ML) INJECTION PRN
Status: DISCONTINUED | OUTPATIENT
Start: 2020-09-28 | End: 2020-10-01 | Stop reason: HOSPADM

## 2020-09-28 RX ORDER — 0.9 % SODIUM CHLORIDE 0.9 %
80 INTRAVENOUS SOLUTION INTRAVENOUS ONCE
Status: COMPLETED | OUTPATIENT
Start: 2020-09-28 | End: 2020-09-28

## 2020-09-28 RX ADMIN — SODIUM CHLORIDE 80 ML: 9 INJECTION, SOLUTION INTRAVENOUS at 15:28

## 2020-09-28 RX ADMIN — IOVERSOL 125 ML: 741 INJECTION INTRA-ARTERIAL; INTRAVENOUS at 15:28

## 2020-09-28 RX ADMIN — Medication 10 ML: at 15:28

## 2020-09-28 NOTE — PROGRESS NOTES
Orthopedic Shoulder Encounter Note     Chief complaint: Left shoulder pain    HPI: Lore Kuo is a 52 y.o.  right-hand dominant female who presents for evaluation of her left shoulder which is been hurting for about a year now. She states that a year ago she fell coming down the front steps to her home hurting her left shoulder and had \"massive pain in my shoulder\". She did not feel that she had broken anything so put off seen anybody for it but when she had persistent pain she thought about seeing her PCP but due to the onset of the coronavirus pandemic this got postponed until she felt comfortable getting in to see her PCP. At this time she complains of pain over the lateral aspect of the shoulder. It is constant but certainly worse with movement and use. She has a difficult time lifting her arm up or reaching out and abduction. She also has some positional nighttime pain. Prior to this she really did not have any problems with the shoulder but states that she occasionally will have a popping in her shoulder ever since she was in high school. This began after she fell once again down a flight of stairs while on field trip. Previous treatment:    NSAIDs: Ibuprofen    Physical Therapy:  No    Injections: None    Surgeries: None    Review of Systems:     Constitution: no fever or chills   Pain level: 8/10  Musculoskeletal: As noted in the HPI   Neurologic: no neurologic symptoms    Past Medical History  Skip  has a past medical history of Anxiety, CAD (coronary artery disease), Chronic back pain, Depression, Fibromyalgia, Headache(784.0), High cholesterol, and Palpitations. Past Surgical History  Skip  has a past surgical history that includes Appendectomy; Tubal ligation; Spine surgery; and Cystoscopy (N/A, 5/24/2019).     Current Medications  Current Outpatient Medications   Medication Sig Dispense Refill    Omega-3 Fatty Acids (FISH OIL PO) Take by mouth daily      IBUPROFEN PO Take by mouth as needed      Multiple Vitamins-Minerals (IMMUNE SUPPORT PO) Take by mouth daily      vitamin D (ERGOCALCIFEROL) 1.25 MG (70146 UT) CAPS capsule Take 1 capsule by mouth once a week 12 capsule 0    oxybutynin (DITROPAN-XL) 10 MG extended release tablet Take 1 tablet by mouth daily 90 tablet 3    busPIRone (BUSPAR) 10 MG tablet Take 1 tablet by mouth every evening 90 tablet 3    baclofen (LIORESAL) 10 MG tablet Take 1 tablet by mouth 3 times daily as needed (MUSCLE SPASMS) Causes sedation, do not drive while taking this medication 90 tablet 0    lidocaine (LMX) 4 % cream Apply 2-3 times a day as needed for pain 120 g 3    diclofenac sodium (VOLTAREN) 1 % GEL Apply 2 g topically 4 times daily as needed for Pain 1 Tube 3     No current facility-administered medications for this visit. Allergies  Allergies have been reviewed. Skip is allergic to effexor [venlafaxine hcl]; morphine; and pcn [penicillins]. Social History  Skip  reports that she has never smoked. She has never used smokeless tobacco. She reports current alcohol use. She reports that she does not use drugs. Family History  Skip's family history includes Alcohol Abuse in her father; Breast Cancer in her sister; Cancer in her mother; Cervical Cancer in her sister; Coronary Art Dis in her sister; Diabetes in her mother.      Physical Exam:     Temp 97.9 °F (36.6 °C) (Infrared)   Ht 5' 3\" (1.6 m)   Wt 210 lb (95.3 kg)   BMI 37.20 kg/m²    General Appearance: alert, well appearing, and in no distress  Mental Status: alert, oriented to person, place, and time  Gait: normal    Shoulder:    Skin: warm and dry, no rash or erythema; no swelling or obvious muscular atrophy  Vasculature: 2+ radial pulses bilaterally  Neuro: Sensation grossly intact to light touch diffusely  Tenderness: Tender to palpation over the anterior aspect of the left shoulder and also tender to palpation over the anterolateral corner. ROM: (Degrees)    Right   A P   Left   A P    Elevation  140    Elevation  135 135  Abduction  135    Abduction  140  145  ER   80    ER   75 80  IR   L1    IR   L1   90 abd/ER      90 abd/ER     90 abd/IR      90 abd/IR     Crepitation  No    Crepitation No  Dyskenesia  No    Dyskenesia No      Muscle strength:    Right       Left    Deltoid   5    Deltoid   5  Supraspinatus  5    Supraspinatus  4  ER   5    ER   5  IR   5    IR   5    Special tests    Right   Rotator Cuff    Left    n   Painful arc    y   n   Pain with ER    n    n   Neer's     n    n   Hawkin's    y    n   Drop Arm    n  n   Lift off/Belly Press   n  n   ER Lag    n          AC Joint  n   AC tenderness   n  n   Cross-chest adduction  n       Labrum/biceps    n   [place]'s    y (equivocal)   n   Biceps sheer    n      n   Speed's/Yergason's   y    n   Tenderness Biceps Groove  y    n   Michele's    n         Instability  n   Ant Apprehension   n    n   Post Apprehension   n    n   Ant Load shift    n    n   Post Load shift   n   n   Sulcus     n  n   Generalized Laxity   n  n   Relocation test   n  n   Crank test     n  n   Etienne-superior escape  n       Imaging:  Xrays: 4 views of the left shoulder obtained on 9/28/2020 were independently reviewed  Indications: Left shoulder pain  Findings: Glenohumeral joint space appears to be reasonably preserved. Some narrowing noted at the acromioclavicular joint space associated with osteophytic change. Type I acromion. No obvious fracture, dislocation, or subluxation. Impression: Left shoulder radiograph with moderate degenerative changes at the acromioclavicular joint    Impression/Plan:     [place]a [place] is a 52 y.o. old female  with left shoulder pain that is concerning for the possibility of a full-thickness rotator cuff tear.   Given the patient's age and activity level and the mechanism of injury I would recommend proceeding at this time with an MRI study of her shoulder to better delineate the nature and extent of any injury to the shoulder. We will facilitate her getting the study completed and I will have her follow-up afterwards to review and discuss results proceeding with treatment at that time as indicated.         NA = Not assessed  RTC = Rotator cuff  RCT = Rotator cuff tear  ER = External rotation  IR = Internal rotation  AC = Acromioclavicular  GH = Glenohumeral  n = No  y = Yes

## 2020-09-28 NOTE — LETTER
9/28/2020    Haily Fam MD  [address]  1 [facility],[address], [address] [facility]    RE: Ghazal Dillon    Dear Dr. Alexa Pearce,    Thank you for allowing me to participate in the care of Ms. Nathalia Silverman. I had the opportunity to evaluate the patient on 9/28/2020. Attached you will find my evaluation and recommendations. Thanks again for the confidence you have expressed in me by allowing my participation in the care of your patient. I will keep you apprised of further developments in the patients treatment course as it progresses. If I can be of further assistance in any fashion, please feel free to contact me at your convenience.     Sincerely,        Bea Moise  Shoulder and Elbow Surgery

## 2020-10-05 ENCOUNTER — PROCEDURE VISIT (OUTPATIENT)
Dept: UROLOGY | Age: 50
End: 2020-10-05
Payer: MEDICARE

## 2020-10-05 VITALS — HEART RATE: 68 BPM | TEMPERATURE: 97.6 F | DIASTOLIC BLOOD PRESSURE: 68 MMHG | SYSTOLIC BLOOD PRESSURE: 112 MMHG

## 2020-10-05 PROCEDURE — 52000 CYSTOURETHROSCOPY: CPT | Performed by: UROLOGY

## 2020-10-05 NOTE — PROGRESS NOTES
Cystoscopy Operative Note (10/5/20):  Surgeon: Dotnae Polanco MD   Anesthesia: Urethral 2% Xylocaine  Indications: Hematuria   Position: Dorsal Lithotomy    Findings:   Risks and Benefits discussed with patient prior to procedure. The patient was prepped and draped in the usual sterile fashion. The flexible cystoscope was advanced through the urethra and into the bladder. The bladder was thoroughly inspected and the following was noted:    Vagina: normal appearing vagina with normal color and discharge, no lesions  Residual Urine: none  Urethra: not indicated  Bladder: No tumors or CIS noted. Very small diverticulum. There was none trabeculation noted. Ureters: Clear efflux from both ureters. Orifices with normal configuration and location. The cystoscope was removed. The patient tolerated the procedure well. Agree with the ROS entered by the MA.     Plan: f/u 1 year ua  Cont oxybutynin

## 2020-10-07 ENCOUNTER — HOSPITAL ENCOUNTER (OUTPATIENT)
Dept: MRI IMAGING | Age: 50
Discharge: HOME | End: 2020-10-09
Payer: MEDICARE

## 2020-10-07 PROCEDURE — 73221 MRI JOINT UPR EXTREM W/O DYE: CPT

## 2020-10-11 NOTE — PROGRESS NOTES
HPI: Ms. Nena Sung is here today to review the results of her left shoulder MRI study which was completed on 10/7/2020. I did review the images with the patient today which demonstrates intact subscapularis, supraspinatus, infraspinatus, and teres minor tendons. Biceps is not adequately visualized within the bicipital groove. There is some increased signal within the subacromial space on T2-weighted images consistent with subacromial bursitis. No obvious chondral or labral injury. I had a discussion with the patient today with regards to her MRI findings. She has been dealing now with chronic pain for about a year. She really has not received any treatment for this. Based on her MRI findings I do not find any immediate surgical indications. I would recommend proceeding with a course of physical therapy and also recommended a cortisone injection into the subacromial space which she was amenable to. This was administered as outlined below. I anticipate improvement and resolution of her symptoms with this treatment and so I will have her follow-up my clinic as needed. However she was encouraged to return or call with persistent or worsening symptoms and with any questions and/or concerns. Procedure: left shoulder subacromial space injection  Following an appropriate discussion with the patient regarding the risks and benefits of the procedure she consented to proceed. her left shoulder was prepped using chlorhexadine solution. Using aseptic technique and through a posterior approach, her left shoulder subacromial space was injected with a 4 cc mixture of 1cc 40mg/ml kenalog and 3 cc of 1% lidocaine without epinephrine. A band aid was applied to the injection site. she tolerated the injection with no immediate adverse reactions.

## 2020-10-12 ENCOUNTER — OFFICE VISIT (OUTPATIENT)
Dept: ORTHOPEDIC SURGERY | Age: 50
End: 2020-10-12
Payer: MEDICARE

## 2020-10-12 VITALS — BODY MASS INDEX: 37.21 KG/M2 | TEMPERATURE: 97.9 F | HEIGHT: 63 IN | WEIGHT: 210 LBS

## 2020-10-12 PROCEDURE — G8417 CALC BMI ABV UP PARAM F/U: HCPCS | Performed by: ORTHOPAEDIC SURGERY

## 2020-10-12 PROCEDURE — 20610 DRAIN/INJ JOINT/BURSA W/O US: CPT | Performed by: ORTHOPAEDIC SURGERY

## 2020-10-12 PROCEDURE — 3017F COLORECTAL CA SCREEN DOC REV: CPT | Performed by: ORTHOPAEDIC SURGERY

## 2020-10-12 PROCEDURE — 1036F TOBACCO NON-USER: CPT | Performed by: ORTHOPAEDIC SURGERY

## 2020-10-12 PROCEDURE — G8484 FLU IMMUNIZE NO ADMIN: HCPCS | Performed by: ORTHOPAEDIC SURGERY

## 2020-10-12 PROCEDURE — G8427 DOCREV CUR MEDS BY ELIG CLIN: HCPCS | Performed by: ORTHOPAEDIC SURGERY

## 2020-10-12 PROCEDURE — 99212 OFFICE O/P EST SF 10 MIN: CPT | Performed by: ORTHOPAEDIC SURGERY

## 2020-10-12 RX ORDER — LIDOCAINE HYDROCHLORIDE 10 MG/ML
3 INJECTION, SOLUTION INFILTRATION; PERINEURAL ONCE
Status: COMPLETED | OUTPATIENT
Start: 2020-10-12 | End: 2020-10-12

## 2020-10-12 RX ORDER — TRIAMCINOLONE ACETONIDE 40 MG/ML
40 INJECTION, SUSPENSION INTRA-ARTICULAR; INTRAMUSCULAR ONCE
Status: COMPLETED | OUTPATIENT
Start: 2020-10-12 | End: 2020-10-12

## 2020-10-12 RX ADMIN — LIDOCAINE HYDROCHLORIDE 3 ML: 10 INJECTION, SOLUTION INFILTRATION; PERINEURAL at 09:45

## 2020-10-12 RX ADMIN — TRIAMCINOLONE ACETONIDE 40 MG: 40 INJECTION, SUSPENSION INTRA-ARTICULAR; INTRAMUSCULAR at 09:45

## 2020-10-12 NOTE — LETTER
10/12/2020    Garland Freitas MD  [address]  1 [facility],[address], [address] [facility]    RE: Chioma Parikh    Dear Dr. Chelsi Pereyra,    Thank you for allowing me to participate in the care of Ms. Chela Diego. I had the opportunity to evaluate the patient on 10/12/2020. Attached you will find my evaluation and recommendations. Thanks again for the confidence you have expressed in me by allowing my participation in the care of your patient. I will keep you apprised of further developments in the patients treatment course as it progresses. If I can be of further assistance in any fashion, please feel free to contact me at your convenience.     Sincerely,        Bea Moise  Shoulder and Elbow Surgery

## 2020-10-26 ENCOUNTER — TELEPHONE (OUTPATIENT)
Dept: FAMILY MEDICINE CLINIC | Age: 50
End: 2020-10-26

## 2020-10-26 RX ORDER — GABAPENTIN 100 MG/1
CAPSULE ORAL
Qty: 18 CAPSULE | Refills: 0 | Status: SHIPPED | OUTPATIENT
Start: 2020-10-26 | End: 2021-01-06

## 2020-10-26 NOTE — TELEPHONE ENCOUNTER
Yes, could by tens unit from over the counter at the store      We could try gabapentin small dosage, physical therapy  . Gabapentin is controlled, does she want to try it? How about referral to physical therapy ? ?      Future Appointments   Date Time Provider Derek Rosas   12/16/2020  9:15 AM Holly Umana MD [facility]

## 2020-10-26 NOTE — TELEPHONE ENCOUNTER
Please let the patient know to  prescription from pharmacy. Requested Prescriptions     Signed Prescriptions Disp Refills    gabapentin (NEURONTIN) 100 MG capsule 18 capsule 0     Sig: Take 1 capsule by mouth nightly for 3 days, THEN 1 capsule 2 times daily for 3 days, THEN 1 capsule 3 times daily for 3 days. Authorizing Provider: Milla Camacho #[id number] Mariah Fraustogilbert, [address] [place] [address]  Phone: [phone] Fax: [phone]      Thank you! FYI    Future Appointments   Date Time Provider Derek Rosas   12/16/2020  9:15 AM Garland Freitas MD [id number]       Controlled Substances Monitoring: Periodic Controlled Substance Monitoring: No signs of potential drug abuse or diversion identified.  Garland Freitas MD)

## 2020-11-03 PROBLEM — R42 DIZZINESS: Status: RESOLVED | Noted: 2020-11-03 | Resolved: 2020-11-03

## 2020-11-09 ENCOUNTER — OFFICE VISIT (OUTPATIENT)
Dept: UROLOGY | Age: 50
End: 2020-11-09
Payer: MEDICARE

## 2020-11-09 VITALS
BODY MASS INDEX: 37.21 KG/M2 | TEMPERATURE: 98.1 F | SYSTOLIC BLOOD PRESSURE: 95 MMHG | HEIGHT: 63 IN | DIASTOLIC BLOOD PRESSURE: 58 MMHG | WEIGHT: 210 LBS | HEART RATE: 73 BPM

## 2020-11-09 PROCEDURE — 1036F TOBACCO NON-USER: CPT | Performed by: UROLOGY

## 2020-11-09 PROCEDURE — 3017F COLORECTAL CA SCREEN DOC REV: CPT | Performed by: UROLOGY

## 2020-11-09 PROCEDURE — G8484 FLU IMMUNIZE NO ADMIN: HCPCS | Performed by: UROLOGY

## 2020-11-09 PROCEDURE — G8417 CALC BMI ABV UP PARAM F/U: HCPCS | Performed by: UROLOGY

## 2020-11-09 PROCEDURE — 99214 OFFICE O/P EST MOD 30 MIN: CPT | Performed by: UROLOGY

## 2020-11-09 PROCEDURE — G8427 DOCREV CUR MEDS BY ELIG CLIN: HCPCS | Performed by: UROLOGY

## 2020-11-09 RX ORDER — OXYCODONE HYDROCHLORIDE 5 MG/1
5 CAPSULE ORAL EVERY 6 HOURS PRN
COMMUNITY
Start: 2020-11-04 | End: 2020-11-09 | Stop reason: SDUPTHER

## 2020-11-09 ASSESSMENT — ENCOUNTER SYMPTOMS
BACK PAIN: 1
VOMITING: 0
CONSTIPATION: 0
NAUSEA: 1
DIARRHEA: 0
WHEEZING: 0
EYE PAIN: 0
ABDOMINAL PAIN: 1
COUGH: 0
EYE REDNESS: 0
SHORTNESS OF BREATH: 0

## 2020-11-09 NOTE — PROGRESS NOTES
Component Value Date    BUN 9 05/03/2019     Lab Results   Component Value Date    CREATININE 0.88 05/03/2019       Additional Lab/Culture results: none      PAST MEDICAL, FAMILY AND SOCIAL HISTORY UPDATE:  Past Medical History:   Diagnosis Date    Anxiety     unison ,on meds in past     CAD (coronary artery disease) 5/5/2014    Chronic back pain     Depression     post partum and childhood    Fibromyalgia     Headache(784.0)     High cholesterol     Palpitations      Past Surgical History:   Procedure Laterality Date    APPENDECTOMY      CYSTOSCOPY N/A 5/24/2019    CYSTOSCOPY performed by Alban Mackey MD at [address] [place] [address]       Family History   Problem Relation Age of Onset    Diabetes Mother     Cancer Mother         breast  age 76 cervical     Alcohol Abuse Father     Coronary Art Dis Sister     Cervical Cancer Sister     Breast Cancer Sister         age 48     Outpatient Medications Marked as Taking for the 11/9/20 encounter (Office Visit) with Carmen Renteria MD   Medication Sig Dispense Refill    Omega-3 Fatty Acids (FISH OIL PO) Take by mouth daily      IBUPROFEN PO Take by mouth as needed      Multiple Vitamins-Minerals (IMMUNE SUPPORT PO) Take by mouth daily      vitamin D (ERGOCALCIFEROL) 1.25 MG (44503 UT) CAPS capsule Take 1 capsule by mouth once a week 12 capsule 0    oxybutynin (DITROPAN-XL) 10 MG extended release tablet Take 1 tablet by mouth daily 90 tablet 3    busPIRone (BUSPAR) 10 MG tablet Take 1 tablet by mouth every evening 90 tablet 3    baclofen (LIORESAL) 10 MG tablet Take 1 tablet by mouth 3 times daily as needed (MUSCLE SPASMS) Causes sedation, do not drive while taking this medication 90 tablet 0    lidocaine (LMX) 4 % cream Apply 2-3 times a day as needed for pain 120 g 3    diclofenac sodium (VOLTAREN) 1 % GEL Apply 2 g topically 4 times daily as needed for Pain 1 Tube 3      (All medications reviewed and updated by

## 2020-11-10 ENCOUNTER — OFFICE VISIT (OUTPATIENT)
Dept: FAMILY MEDICINE CLINIC | Age: 50
End: 2020-11-10
Payer: MEDICARE

## 2020-11-10 VITALS
WEIGHT: 214 LBS | BODY MASS INDEX: 37.92 KG/M2 | SYSTOLIC BLOOD PRESSURE: 120 MMHG | DIASTOLIC BLOOD PRESSURE: 70 MMHG | HEART RATE: 62 BPM | HEIGHT: 63 IN | OXYGEN SATURATION: 99 %

## 2020-11-10 PROBLEM — R26.9 GAIT DIFFICULTY: Status: RESOLVED | Noted: 2019-04-18 | Resolved: 2020-11-10

## 2020-11-10 PROCEDURE — 1036F TOBACCO NON-USER: CPT | Performed by: FAMILY MEDICINE

## 2020-11-10 PROCEDURE — 99214 OFFICE O/P EST MOD 30 MIN: CPT | Performed by: FAMILY MEDICINE

## 2020-11-10 PROCEDURE — G8417 CALC BMI ABV UP PARAM F/U: HCPCS | Performed by: FAMILY MEDICINE

## 2020-11-10 PROCEDURE — G8427 DOCREV CUR MEDS BY ELIG CLIN: HCPCS | Performed by: FAMILY MEDICINE

## 2020-11-10 PROCEDURE — G8484 FLU IMMUNIZE NO ADMIN: HCPCS | Performed by: FAMILY MEDICINE

## 2020-11-10 PROCEDURE — 3017F COLORECTAL CA SCREEN DOC REV: CPT | Performed by: FAMILY MEDICINE

## 2020-11-10 ASSESSMENT — ENCOUNTER SYMPTOMS
ABDOMINAL DISTENTION: 0
NAUSEA: 0
ABDOMINAL PAIN: 0
CONSTIPATION: 0
VOMITING: 0
CHEST TIGHTNESS: 0
COUGH: 0
BACK PAIN: 1
SHORTNESS OF BREATH: 0
WHEEZING: 0
DIARRHEA: 0

## 2020-11-10 NOTE — PATIENT INSTRUCTIONS
Patient Education        Abdominal Pain: Care Instructions  Your Care Instructions     Abdominal pain has many possible causes. Some aren't serious and get better on their own in a few days. Others need more testing and treatment. If your pain continues or gets worse, you need to be rechecked and may need more tests to find out what is wrong. You may need surgery to correct the problem. Don't ignore new symptoms, such as fever, nausea and vomiting, urination problems, pain that gets worse, and dizziness. These may be signs of a more serious problem. Your doctor may have recommended a follow-up visit in the next 8 to 12 hours. If you are not getting better, you may need more tests or treatment. The doctor has checked you carefully, but problems can develop later. If you notice any problems or new symptoms, get medical treatment right away. Follow-up care is a key part of your treatment and safety. Be sure to make and go to all appointments, and call your doctor if you are having problems. It's also a good idea to know your test results and keep a list of the medicines you take. How can you care for yourself at home? · Rest until you feel better. · To prevent dehydration, drink plenty of fluids, enough so that your urine is light yellow or clear like water. Choose water and other caffeine-free clear liquids until you feel better. If you have kidney, heart, or liver disease and have to limit fluids, talk with your doctor before you increase the amount of fluids you drink. · If your stomach is upset, eat mild foods, such as rice, dry toast or crackers, bananas, and applesauce. Try eating several small meals instead of two or three large ones. · Wait until 48 hours after all symptoms have gone away before you have spicy foods, alcohol, and drinks that contain caffeine. · Do not eat foods that are high in fat. · Avoid anti-inflammatory medicines such as aspirin, ibuprofen (Advil, Motrin), and naproxen (Aleve). These can cause stomach upset. Talk to your doctor if you take daily aspirin for another health problem. When should you call for help? Call 911 anytime you think you may need emergency care. For example, call if:    · You passed out (lost consciousness).     · You pass maroon or very bloody stools.     · You vomit blood or what looks like coffee grounds.     · You have new, severe belly pain. Call your doctor now or seek immediate medical care if:    · Your pain gets worse, especially if it becomes focused in one area of your belly.     · You have a new or higher fever.     · Your stools are black and look like tar, or they have streaks of blood.     · You have unexpected vaginal bleeding.     · You have symptoms of a urinary tract infection. These may include:  ? Pain when you urinate. ? Urinating more often than usual.  ? Blood in your urine.     · You are dizzy or lightheaded, or you feel like you may faint. Watch closely for changes in your health, and be sure to contact your doctor if:    · You are not getting better after 1 day (24 hours). Where can you learn more? Go to https://[employer].Thinknum. org and sign in to your [employer] account. Enter W855 in the MyNextRun box to learn more about \"Abdominal Pain: Care Instructions. \"     If you do not have an account, please click on the \"Sign Up Now\" link. Current as of: June 26, 2019               Content Version: 12.6  © 6268-8500 [employer]. Care instructions adapted under license by [facility] ([facility]). If you have questions about a medical condition or this instruction, always ask your healthcare professional. [place] [address] any warranty or liability for your use of this information.

## 2020-11-10 NOTE — PROGRESS NOTES
Chief Complaint   Patient presents with    Flank Pain     persistant [facility]          HPI    Patient comes today  For follow up after ED visit    Was seen in ED on 11/4/2020 at  [address] [facility] [address] left flank pain was severe made her have nausea  UA showed blood in the urine  BMP, CBC within normal limits   She says she was given Oxycodone, but didn't help with pain      Currently she complains of left flank pain for 1 week, cramping like, worsening  Thought she pulled a muscle initially but is not getting better  Took Aleve but didn't get better, pain got worse, couldn't bend, couldn't sleep ll  Called off work and rested and the pain still didn't get better, so she went to emergency room. Works in childcare, but doesn't lift      Regarding the blood in the urine seen microscopically , patient says she saw urology, and was  told nothing it's concerning. The urologist also told her the changes on the CAT scan were read wrong on the right side, and they were actually on the left side. However, patient does have history of of kidney stones    She feels pain is not so intense anymore, today intensity of pain is 7-8/10  The pain seems to be worse with activities, especially when bending, twisting towards the left side or when bending forward. ED gave her 3 days off, she says she does not need a letter for work      Patient reports she has chronic frequency of urination and nocturia 3 times at night, every night, and the urologist is treating her for overactive bladder. Denies seeing passing a stone  Denies gross hematuria and dysuria    Patient reports she feels the pain is mostly under the left ribs, and she reports left lower chest pain when taking a deep breath. Pain \"is deep\".   She does not think is due to muscular skeletal tissues  Denies cough  Patient reports intermittently she gets palpitations \"for years\", frequency about monthly  Had stress test in the past and it was normal.    Reprinted all labs and advised patient to complete the testing, as she did not complete it. [address] show possible right renal pelvis prominence and possibly she has passed calculus/stone, her urologist says the radiologic finding is on the left side which coincides with the left flank pain she has. Again she never saw herself passing a stone. \"CT abdomen   Impression:     *  Mild prominence right renal pelvis without obstructive seen lesion may represent stenosis or recently passed calculus  \"    MRI lumbar spine from 7/3/2015 showed degenerative disc disease       Patient is due for colon cancer screening. Skip denies  nausea, vomiting, diarrhea, constipation, blood in the stool or abdominal pain. We discussed options, she would like to have: colonoscopy. Current Outpatient Medications on File Prior to Visit   Medication Sig Dispense Refill    Omega-3 Fatty Acids (FISH OIL PO) Take by mouth daily      IBUPROFEN PO Take by mouth as needed      Multiple Vitamins-Minerals (IMMUNE SUPPORT PO) Take by mouth daily      vitamin D (ERGOCALCIFEROL) 1.25 MG (76846 UT) CAPS capsule Take 1 capsule by mouth once a week 12 capsule 0    oxybutynin (DITROPAN-XL) 10 MG extended release tablet Take 1 tablet by mouth daily 90 tablet 3    busPIRone (BUSPAR) 10 MG tablet Take 1 tablet by mouth every evening 90 tablet 3    baclofen (LIORESAL) 10 MG tablet Take 1 tablet by mouth 3 times daily as needed (MUSCLE SPASMS) Causes sedation, do not drive while taking this medication 90 tablet 0    lidocaine (LMX) 4 % cream Apply 2-3 times a day as needed for pain 120 g 3    diclofenac sodium (VOLTAREN) 1 % GEL Apply 2 g topically 4 times daily as needed for Pain 1 Tube 3    gabapentin (NEURONTIN) 100 MG capsule Take 1 capsule by mouth nightly for 3 days, THEN 1 capsule 2 times daily for 3 days, THEN 1 capsule 3 times daily for 3 days.  18 capsule 0     No current facility-administered medications on file prior to visit. Social History     Tobacco Use    Smoking status: Never Smoker    Smokeless tobacco: Never Used   Substance Use Topics    Alcohol use: Yes     Alcohol/week: 0.0 standard drinks     Comment: rarely    Drug use: No       Counseling given: Yes                    The patient's past medical, surgical, social, and family history as well as her current medications and allergies were reviewed as documented in today's encounter. Rest of complaints with corresponding details per ROS. Review of Systems   Constitutional: Positive for fatigue. Negative for activity change, appetite change, chills, diaphoresis, fever and unexpected weight change. Respiratory: Negative for cough, chest tightness, shortness of breath and wheezing. Cardiovascular: Positive for chest pain (left lower chest, posteriorly) and palpitations. Negative for leg swelling. Gastrointestinal: Negative for abdominal distention, abdominal pain, constipation, diarrhea, nausea and vomiting. Endocrine: Negative for cold intolerance, heat intolerance, polydipsia, polyphagia and polyuria. Genitourinary: Positive for flank pain (left) and frequency. Negative for difficulty urinating, dysuria, hematuria and urgency. Musculoskeletal: Positive for back pain. Psychiatric/Behavioral: Positive for sleep disturbance.         -vital signs stable and within normal limits except severe Obesity per BMI. /70   Pulse 62   Ht 5' 3\" (1.6 m)   Wt 214 lb (97.1 kg)   SpO2 99%   BMI 37.91 kg/m²        Physical Exam  Vitals signs and nursing note reviewed. Constitutional:       General: She is not in acute distress. Appearance: Normal appearance. She is well-developed. She is obese. She is not diaphoretic. HENT:      Head: Normocephalic and atraumatic. Right Ear: External ear normal.      Left Ear: External ear normal.      Nose: Nose normal. No mucosal edema. Mouth/Throat:      Comments: I did not examine the mouth due to coronavirus pandemic and wearing masks    Eyes:      General: Lids are normal. No scleral icterus. Right eye: No discharge. Left eye: No discharge. Conjunctiva/sclera: Conjunctivae normal.   Neck:      Musculoskeletal: Normal range of motion and neck supple. Thyroid: No thyromegaly. Cardiovascular:      Rate and Rhythm: Normal rate and regular rhythm. Heart sounds: Normal heart sounds. No murmur. Pulmonary:      Effort: Pulmonary effort is normal. No respiratory distress. Breath sounds: Normal breath sounds. No wheezing or rales. Chest:      Chest wall: No tenderness. Abdominal:      General: Bowel sounds are normal. There is no distension. Palpations: Abdomen is soft. There is no hepatomegaly or splenomegaly. Tenderness: There is abdominal tenderness in the left upper quadrant. There is left CVA tenderness. Comments: Obese abdomen. Musculoskeletal:         General: Tenderness present. Lumbar back: She exhibits decreased range of motion, bony tenderness (lumbar spine, diffuse), pain and spasm. Right lower leg: No edema. Left lower leg: No edema. Comments: Reproducible pain with bending lateral and forward, and twisting   Skin:     General: Skin is warm and dry. Capillary Refill: Capillary refill takes less than 2 seconds. Findings: No rash. Neurological:      Mental Status: She is alert and oriented to person, place, and time. Cranial Nerves: No cranial nerve deficit. Motor: No abnormal muscle tone. Psychiatric:         Mood and Affect: Mood is anxious. Behavior: Behavior normal.         Thought Content: Thought content normal.         Judgment: Judgment normal.         ASSESSMENT AND PLAN      1. Left flank pain  Failing to change as expected. - XR CHEST (2 VW);  Future  -start camphor-menthol-methyl salicylate (BENGAY ULTRA STRENGTH) 4-10-30 % CREA cream; Apply topically 3 times daily as needed for Pain  Dispense: 113 g; Refill: 0  - [facility]  [place]  Likely musculoskeletal related to degenerative disc disease  Possibly she might have passed a small stone per CT findings  We need to rule out pulmonary and cardiac causes    2. Atypical chest pain  Pleuritic type   - XR CHEST (2 VW); Future  - EKG 12 Lead; Future  - camphor-menthol-methyl salicylate (BENGAY ULTRA STRENGTH) 4-10-30 % CREA cream; Apply topically 3 times daily as needed for Pain  Dispense: 113 g; Refill: 0    3. Lumbar back pain  Failing to change as expected. Topical creams  - [address]    4. Colon cancer screening  - [facility]    5. DDD (degenerative disc disease), lumbar  Likely worsening due to wear and tear nature of the disease. Start PT    Reprinted all labs and advised patient to complete the testing. Has order for urine testing to see if still hematuria, reprinted   Might have passed the stone per CT report  Likely musculoskeletal   rule out cardiac or lung disease     Data Unavailable    Orders Placed This Encounter   Procedures    XR CHEST (2 VW)     Standing Status:   Future     Standing Expiration Date:   11/10/2021     Order Specific Question:   Reason for exam:     Answer:   left lower chest pain when taking a deep breath   [facility] Screening Colonoscopy     Referral Priority:   Routine     Referral Type: Other     Referral Reason:   Specialty Services Required     Number of Visits Requested:   [address]     Referral Priority:   Routine     Referral Type:   Eval and Treat     Referral Reason:   Specialty Services Required     Requested Specialty:   Physical Therapy     Number of Visits Requested:   1    EKG 12 Lead     Standing Status:   Future     Standing Expiration Date:   11/10/2021     Order Specific Question:   Reason for Exam?     Answer:    Atypical chest pain Orders Placed This Encounter   Medications    camphor-menthol-methyl salicylate (BENGAY ULTRA STRENGTH) 4-10-30 % CREA cream     Sig: Apply topically 3 times daily as needed for Pain     Dispense:  113 g     Refill:  0       There are no discontinued medications. Skip received counseling on the following healthy behaviors: nutrition, exercise, medication adherence and weight loss    Reviewed prior labs and health maintenance. Continue current medications, diet and exercise. Discussed use, benefit, and side effects of prescribed medications. Barriers to medication compliance addressed. Patient given educational materials - see patient instructions. All patient questions answered. Patient voiced understanding. Thepatient's past medical, surgical, social, and family history as well as her   current medications and allergies were reviewed as documented in today's encounter. Medications, labs, diagnostic studies,consultations and follow-up as documented in this encounter. Return for KEEP APPT. Patient was seen with total face to face time of  25 minutes. More than 50% of this visit was counseling and education. Future Appointments   Date Time Provider Deerk Alison   12/16/2020  9:15 AM Karla Dockery MD [facility]     This note was completed by using the assistance of a speech-recognition program. However, inadvertent computerized transcription errors may be present. Although every effort was made to ensure accuracy, no guarantees can be provided that every mistake has been identified and corrected by editing.     Electronically signed by Karla Dockery MD on 11/10/2020 at 9:55 PM

## 2020-11-10 NOTE — PROGRESS NOTES
Visit Information    Have you changed or started any medications since your last visit including any over-the-counter medicines, vitamins, or herbal medicines? no   Are you having any side effects from any of your medications? -  no  Have you stopped taking any of your medications? Is so, why? -  no    Have you seen any other physician or provider since your last visit? No  Have you had any other diagnostic tests since your last visit? Yes - Records Obtained  Have you been seen in the emergency room and/or had an admission to a hospital since we last saw you? Yes - Records Obtained  Have you had your routine dental cleaning in the past 6 months? no    Have you activated your [employer] account? If not, what are your barriers?  Yes     Patient Care Team:  Karla Dockery MD as PCP - General (Family Medicine)  Karla Dockery MD as PCP - [facility]  Karlie Junior MD as Consulting Physician (Cardiology)  Justice Navas MD as Consulting Physician (Neurology)  Clayton Irwin MD as Consulting Physician (Urology)    Medical History Review  Past Medical, Family, and Social History reviewed and does contribute to the patient presenting condition    Health Maintenance   Topic Date Due    Cervical cancer screen  03/16/2018    Flu vaccine (1) 09/01/2020    Shingles Vaccine (1 of 2) 10/04/2020    Colon cancer screen colonoscopy  10/04/2020    Breast cancer screen  10/03/2021    Lipid screen  04/09/2024    DTaP/Tdap/Td vaccine (3 - Td) 01/09/2028    HIV screen  Completed    Hepatitis A vaccine  Aged Out    Hepatitis B vaccine  Aged Out    Hib vaccine  Aged Out    Meningococcal (ACWY) vaccine  Aged Out    Pneumococcal 0-64 years Vaccine  Aged Out

## 2020-12-28 ENCOUNTER — TELEPHONE (OUTPATIENT)
Dept: FAMILY MEDICINE CLINIC | Age: 50
End: 2020-12-28

## 2020-12-28 NOTE — TELEPHONE ENCOUNTER
Pt says that she has been experiencing chest pressure and wheezing. She was previously sick and had Covid-19 and has not felt right since getting over the sickness. She is requesting a chest X-ray and is being seen at a flu-clinic for her symptoms. Pt would like a call back with an update on her order or if Dr. Tory Mcallister would like to see her.      Pt contact: [phone]

## 2020-12-28 NOTE — TELEPHONE ENCOUNTER
Order placed  She is seen at urgent care now    Orders Placed This Encounter   Procedures    XR CHEST (2 VW)     Standing Status:   Future     Standing Expiration Date:   12/28/2021     Order Specific Question:   Reason for exam:     Answer:   wheezing, chest pressure, post covid

## 2020-12-29 ENCOUNTER — HOSPITAL ENCOUNTER (OUTPATIENT)
Age: 50
Setting detail: SPECIMEN
Discharge: HOME | End: 2020-12-29
Payer: MEDICARE

## 2020-12-29 ENCOUNTER — OFFICE VISIT (OUTPATIENT)
Dept: PRIMARY CARE CLINIC | Age: 50
End: 2020-12-29
Payer: MEDICARE

## 2020-12-29 VITALS
SYSTOLIC BLOOD PRESSURE: 106 MMHG | DIASTOLIC BLOOD PRESSURE: 75 MMHG | WEIGHT: 210 LBS | HEIGHT: 63 IN | HEART RATE: 78 BPM | TEMPERATURE: 98.3 F | RESPIRATION RATE: 16 BRPM | OXYGEN SATURATION: 98 % | BODY MASS INDEX: 37.21 KG/M2

## 2020-12-29 PROCEDURE — 1036F TOBACCO NON-USER: CPT | Performed by: PHYSICIAN ASSISTANT

## 2020-12-29 PROCEDURE — G8484 FLU IMMUNIZE NO ADMIN: HCPCS | Performed by: PHYSICIAN ASSISTANT

## 2020-12-29 PROCEDURE — G8417 CALC BMI ABV UP PARAM F/U: HCPCS | Performed by: PHYSICIAN ASSISTANT

## 2020-12-29 PROCEDURE — 99203 OFFICE O/P NEW LOW 30 MIN: CPT | Performed by: PHYSICIAN ASSISTANT

## 2020-12-29 PROCEDURE — G8427 DOCREV CUR MEDS BY ELIG CLIN: HCPCS | Performed by: PHYSICIAN ASSISTANT

## 2020-12-29 PROCEDURE — 3017F COLORECTAL CA SCREEN DOC REV: CPT | Performed by: PHYSICIAN ASSISTANT

## 2020-12-29 RX ORDER — PREDNISONE 20 MG/1
20 TABLET ORAL 2 TIMES DAILY
Qty: 10 TABLET | Refills: 0 | Status: SHIPPED | OUTPATIENT
Start: 2020-12-29 | End: 2021-01-03

## 2020-12-29 RX ORDER — BENZONATATE 200 MG/1
200 CAPSULE ORAL 3 TIMES DAILY PRN
Qty: 21 CAPSULE | Refills: 0 | Status: SHIPPED | OUTPATIENT
Start: 2020-12-29 | End: 2021-01-05

## 2020-12-29 ASSESSMENT — ENCOUNTER SYMPTOMS
DIARRHEA: 0
VOMITING: 0
COUGH: 1
EYES NEGATIVE: 1
ABDOMINAL PAIN: 0
CHEST TIGHTNESS: 0
SORE THROAT: 0
SINUS PRESSURE: 0
NAUSEA: 0
WHEEZING: 0
SINUS PAIN: 0

## 2020-12-29 NOTE — LETTER
[address]  [facility] [address]  Phone: [phone]  Fax: [phone]    Julia Sellers        December 29, 2020     Patient: Chetna Valle   YOB: 1970   Date of Visit: 12/29/2020       To Whom it May Concern:    Danette Garcia was seen in my clinic on 12/29/2020. She is to remain off work until her [place] comes back negative    If you have any questions or concerns, please don't hesitate to call.     Sincerely,         Ale Hansen, ONUR

## 2020-12-29 NOTE — PATIENT INSTRUCTIONS
Patient Education        Learning About Coronavirus [phone])  Coronavirus [phone]): Overview  What is coronavirus ([id number])? The coronavirus disease (COVID-19) is caused by a virus. It is an illness that was first found in December 2019. It has since spread worldwide. The virus can cause fever, cough, and trouble breathing. In severe cases, it can cause pneumonia and make it hard to breathe without help. It can cause death. This virus spreads person-to-person through droplets from coughing and sneezing. It can also spread when you are close to someone who is infected. And it can spread when you touch something that has the virus on it, such as a doorknob or a tabletop. Coronaviruses are a large group of viruses. They cause the common cold. They also cause more serious illnesses like [place] respiratory syndrome (MERS) and severe acute respiratory syndrome (SARS). COVID-19 is caused by a novel coronavirus. That means it's a new type that has not been seen in people before. How is COVID-19 treated? Mild illness can be treated at home, but more serious illness needs to be treated in the hospital. Treatment may include medicines to reduce symptoms, plus breathing support such as oxygen therapy or a ventilator. Other treatments, such as antiviral medicines, may help people who have COVID-19. What can you do to protect yourself from COVID-19? The best way to protect yourself from getting sick is to:  · Avoid areas where there is an outbreak. · Avoid contact with people who may be infected. · Avoid crowds and try to stay at least 6 feet away from other people. · Wash your hands often, especially after you cough or sneeze. Use soap and water, and scrub for at least 20 seconds. If soap and water aren't available, use an alcohol-based hand . · Avoid touching your mouth, nose, and eyes. What can you do to avoid spreading the virus to others?   To help avoid spreading the virus to others:  · [employer] your hands often with soap or alcohol-based hand sanitizers. · Cover your mouth with a tissue when you cough or sneeze. Then throw the tissue in the trash. · Use a disinfectant to clean things that you touch often. These include doorknobs, remote controls, phones, and handles on your refrigerator and microwave. And don't forget countertops, tabletops, bathrooms, and computer keyboards. · Wear a cloth face cover if you have to go to public areas. If you know or suspect that you have COVID-19:  · Stay home. Don't go to school, work, or public areas. And don't use public transportation, ride-shares, or taxis unless you have no choice. · Leave your home only if you need to get medical care or testing. But call the doctor's office first so they know you're coming. And wear a face cover. · Limit contact with people in your home. If possible, stay in a separate bedroom and use a separate bathroom. · Wear a face cover whenever you're around other people. It can help stop the spread of the virus when you cough or sneeze. · Clean and disinfect your home every day. Use household  and disinfectant wipes or sprays. Take special care to clean things that you grab with your hands. · Self-isolate until it's safe to be around others again. ? If you have symptoms, it's safe when you haven't had a fever for 3 days and your symptoms have improved and it's been at least 10 days since your symptoms started. ? If you were exposed to the virus but don't have symptoms, it's safe to be around others 14 days after exposure. ? Talk to your doctor about whether you also need testing, especially if you have a weakened immune system. When to call for help  Call 911 anytime you think you may need emergency care. For example, call if:  · You have severe trouble breathing. (You can't talk at all.)  · You have constant chest pain or pressure. · You are severely dizzy or lightheaded.   · You are confused or can't think clearly. · Your face and lips have a blue color. · You passed out (lost consciousness) or are very hard to wake up. Call your doctor now if you develop symptoms such as:  · Shortness of breath. · Fever. · Cough. If you need to get care, call ahead to the doctor's office for instructions before you go. Make sure you wear a face cover to prevent exposing other people to the virus. Where can you get the latest information? The following health organizations are tracking and studying this virus. Their websites contain the most up-to-date information. Ladonna Smiley also learn what to do if you think you may have been exposed to the virus. · U.[place]. Centers for Disease Control and Prevention (CDC): The CDC provides updated news about the disease and travel advice. The website also tells you how to prevent the spread of infection. www.cdc.gov  · World Health Organization [facility]): WHO offers information about the virus outbreaks. WHO also has travel advice. www.who.int  Current as of: July 10, 2020               Content Version: 12.6  © 2006-2020 [employer]. Care instructions adapted under license by [employer] . If you have questions about a medical condition or this instruction, always ask your healthcare professional. [place] [address] any warranty or liability for your use of this information. Patient Education        Coronavirus (PGXOS-01): Care Instructions  Overview  The coronavirus disease (COVID-19) is caused by a virus. Symptoms may include a fever, a cough, and shortness of breath. It mainly spreads person-to-person through droplets from coughing and sneezing. The virus also can spread when people are in close contact with someone who is infected. Most people have mild symptoms and can take care of themselves at home.  If their symptoms get worse, they may need care in a hospital. Treatment may include medicines to reduce symptoms, plus breathing support such as oxygen therapy or a ventilator. It's important to not spread the virus to others. If you have COVID-19, wear a face cover anytime you are around other people. You need to isolate yourself while you are sick. Leave your home only if you need to get medical care or testing. Follow-up care is a key part of your treatment and safety. Be sure to make and go to all appointments, and call your doctor if you are having problems. It's also a good idea to know your test results and keep a list of the medicines you take. How can you care for yourself at home? · Get extra rest. It can help you feel better. · Drink plenty of fluids. This helps replace fluids lost from fever. Fluids also help ease a scratchy throat. Water, soup, fruit juice, and hot tea with lemon are good choices. · Take acetaminophen (such as Tylenol) to reduce a fever. It may also help with muscle aches. Read and follow all instructions on the label. · Use petroleum jelly on sore skin. This can help if the skin around your nose and lips becomes sore from rubbing a lot with tissues. Tips for self-isolation  · Limit contact with people in your home. If possible, stay in a separate bedroom and use a separate bathroom. · Wear a cloth face cover when you are around other people. It can help stop the spread of the virus when you cough or sneeze. · If you have to leave home, avoid crowds and try to stay at least 6 feet away from other people. · Avoid contact with pets and other animals. · Cover your mouth and nose with a tissue when you cough or sneeze. Then throw it in the trash right away. · Wash your hands often, especially after you cough or sneeze. Use soap and water, and scrub for at least 20 seconds. If soap and water aren't available, use an alcohol-based hand . · Don't share personal household items. These include bedding, towels, cups and glasses, and eating utensils.   · [employer] laundry in the warmest water allowed for the fabric type, and dry it completely. It's okay to wash other people's laundry with yours. · Clean and disinfect your home every day. Use household  and disinfectant wipes or sprays. Take special care to clean things that you grab with your hands. These include doorknobs, remote controls, phones, and handles on your refrigerator and microwave. And don't forget countertops, tabletops, bathrooms, and computer keyboards. When you can end self-isolation  · If you know or suspect that you have COVID-19, stay in self-isolation until:  ? You haven't had a fever for 3 days, and  ? Your symptoms have improved, and  ? It's been at least 10 days since your symptoms started. · Talk to your doctor about whether you also need testing, especially if you have a weakened immune system. When should you call for help? Call 911 anytime you think you may need emergency care. For example, call if you have life-threatening symptoms, such as:    · You have severe trouble breathing. (You can't talk at all.)     · You have constant chest pain or pressure.     · You are severely dizzy or lightheaded.     · You are confused or can't think clearly.     · Your face and lips have a blue color.     · You pass out (lose consciousness) or are very hard to wake up. Call your doctor now or seek immediate medical care if:    · You have moderate trouble breathing. (You can't speak a full sentence.)     · You are coughing up blood (more than about 1 teaspoon).     · You have signs of low blood pressure. These include feeling lightheaded; being too weak to stand; and having cold, pale, clammy skin. Watch closely for changes in your health, and be sure to contact your doctor if:    · Your symptoms get worse.     · You are not getting better as expected. Call before you go to the doctor's office. Follow their instructions. And wear a cloth face cover. Current as of: July 10, 2020               Content Version: 12.6  © 6879-5069 [employer]. Care instructions adapted under license by [facility] ([facility]). If you have questions about a medical condition or this instruction, always ask your healthcare professional. [place] [address] any warranty or liability for your use of this information. Patient Education        Cough: Care Instructions  Your Care Instructions     A cough is your body's response to something that bothers your throat or airways. Many things can cause a cough. You might cough because of a cold or the flu, bronchitis, or asthma. Smoking, postnasal drip, allergies, and stomach acid that backs up into your throat also can cause coughs. A cough is a symptom, not a disease. Most coughs stop when the cause, such as a cold, goes away. You can take a few steps at home to cough less and feel better. Follow-up care is a key part of your treatment and safety. Be sure to make and go to all appointments, and call your doctor if you are having problems. It's also a good idea to know your test results and keep a list of the medicines you take. How can you care for yourself at home? · Drink lots of water and other fluids. This helps thin the mucus and soothes a dry or sore throat. Honey or lemon juice in hot water or tea may ease a dry cough. · Take cough medicine as directed by your doctor. · Prop up your head on pillows to help you breathe and ease a dry cough. · Try cough drops to soothe a dry or sore throat. Cough drops don't stop a cough. Medicine-flavored cough drops are no better than candy-flavored drops or hard candy. · Do not smoke. Avoid secondhand smoke. If you need help quitting, talk to your doctor about stop-smoking programs and medicines. These can increase your chances of quitting for good. When should you call for help? Call 911 anytime you think you may need emergency care. For example, call if:    · You have severe trouble breathing.    Call your doctor now or seek immediate medical care if:    · You cough up blood.     · You have new or worse trouble breathing.     · You have a new or higher fever.     · You have a new rash. Watch closely for changes in your health, and be sure to contact your doctor if:    · You cough more deeply or more often, especially if you notice more mucus or a change in the color of your mucus.     · You have new symptoms, such as a sore throat, an earache, or sinus pain.     · You do not get better as expected. Where can you learn more? Go to https://[employer]XP.[employer]. org and sign in to your [employer] account. Enter D279 in the via680 box to learn more about \"Cough: Care Instructions. \"     If you do not have an account, please click on the \"Sign Up Now\" link. Current as of: February 24, 2020               Content Version: 12.6  © 3963-7240 [employer]. Care instructions adapted under license by [facility] ([facility]). If you have questions about a medical condition or this instruction, always ask your healthcare professional. [place] [address] any warranty or liability for your use of this information. Patient Education        Learning About Fever  What is a fever? A fever is a high body temperature. It's one way your body fights being sick. A fever shows that the body is responding to infection or other illnesses, both minor and severe. A fever is a symptom, not an illness by itself. A fever can be a sign that you are ill, but most fevers are not caused by a serious problem. You may have a fever with a minor illness, such as a cold. But sometimes a very serious infection may cause little or no fever. It is important to look at other symptoms, other conditions you have, and how you feel in general. In children, notice how they act and see what symptoms they complain of. What is a normal body temperature? A normal body temperature is about 98. 6ºF.  Some people have a normal temperature that is a little higher or a little lower than this. Your temperature may be a little lower in the morning than it is later in the day. It may go up during hot weather or when you exercise, wear heavy clothes, or take a hot bath. Your temperature may also be different depending on how you take it. A temperature taken in the mouth (oral) or under the arm may be a little lower than your core temperature (rectal). What is a fever temperature? A core temperature of 100.4°F or above is considered a fever. What can cause a fever? A fever may be caused by:  · Infections. This is the most common cause of a fever. Examples of infections that can cause a fever include the flu, a kidney infection, or pneumonia. · Some medicines. · Severe trauma or injury, such as a heart attack, stroke, heatstroke, or burns. · Other medical conditions, such as arthritis and some cancers. How can you treat a fever at home? · Ask your doctor if you can take an over-the-counter pain medicine, such as acetaminophen (Tylenol), ibuprofen (Advil, Motrin), or naproxen (Aleve). Be safe with medicines. Read and follow all instructions on the label. · To prevent dehydration, drink plenty of fluids. Choose water and other caffeine-free clear liquids until you feel better. If you have kidney, heart, or liver disease and have to limit fluids, talk with your doctor before you increase the amount of fluids you drink. Follow-up care is a key part of your treatment and safety. Be sure to make and go to all appointments, and call your doctor if you are having problems. It's also a good idea to know your test results and keep a list of the medicines you take. Where can you learn more? Go to https://[employer]aceuticalsshelbieweb.[employer]. org and sign in to your [employer] account. Enter C914 in the [employer] box to learn more about \"Learning About Fever. \"     If you do not have an account, please click on the \"Sign Up Now\" link.   Current as of: June 26, 2019               Content Version: 12.6  © 1741-2583 [employer]. Care instructions adapted under license by [facility] ([facility]). If you have questions about a medical condition or this instruction, always ask your healthcare professional. [address] any warranty or liability for your use of this information.

## 2020-12-29 NOTE — PROGRESS NOTES
meds in past     CAD (coronary artery disease) 5/5/2014    Chronic back pain     Depression     post partum and childhood    Fibromyalgia     Headache(784.0)     High cholesterol     Palpitations          SURGICAL HISTORY     History reviewed. Past Surgical History:   Procedure Laterality Date    APPENDECTOMY      CYSTOSCOPY N/A 5/24/2019    CYSTOSCOPY performed by Binh Garcia MD at [address] TUBAL LIGATION           CURRENT MEDICATIONS       Current Outpatient Medications   Medication Sig Dispense Refill    predniSONE (DELTASONE) 20 MG tablet Take 1 tablet by mouth 2 times daily for 5 days 10 tablet 0    benzonatate (TESSALON) 200 MG capsule Take 1 capsule by mouth 3 times daily as needed for Cough 21 capsule 0    camphor-menthol-methyl salicylate (BENGAY ULTRA STRENGTH) 4-10-30 % CREA cream Apply topically 3 times daily as needed for Pain 113 g 0    Omega-3 Fatty Acids (FISH OIL PO) Take by mouth daily      IBUPROFEN PO Take by mouth as needed      Multiple Vitamins-Minerals (IMMUNE SUPPORT PO) Take by mouth daily      vitamin D (ERGOCALCIFEROL) 1.25 MG (46766 UT) CAPS capsule Take 1 capsule by mouth once a week 12 capsule 0    oxybutynin (DITROPAN-XL) 10 MG extended release tablet Take 1 tablet by mouth daily 90 tablet 3    busPIRone (BUSPAR) 10 MG tablet Take 1 tablet by mouth every evening 90 tablet 3    baclofen (LIORESAL) 10 MG tablet Take 1 tablet by mouth 3 times daily as needed (MUSCLE SPASMS) Causes sedation, do not drive while taking this medication 90 tablet 0    lidocaine (LMX) 4 % cream Apply 2-3 times a day as needed for pain 120 g 3    diclofenac sodium (VOLTAREN) 1 % GEL Apply 2 g topically 4 times daily as needed for Pain 1 Tube 3    gabapentin (NEURONTIN) 100 MG capsule Take 1 capsule by mouth nightly for 3 days, THEN 1 capsule 2 times daily for 3 days, THEN 1 capsule 3 times daily for 3 days.  18 capsule 0     No current facility-administered medications for this visit. ALLERGIES     Effexor [venlafaxine hcl], Morphine, Morphine sulfate, Pcn [penicillins], and Penicillin g    FAMILY HISTORY           Problem Relation Age of Onset    Diabetes Mother     Cancer Mother         breast  age 76 cervical     Alcohol Abuse Father     Coronary Art Dis Sister     Cervical Cancer Sister     Breast Cancer Sister         age 48     Family Status   Relation Name Status    Mother     Creston Sicard Father     Creston Sicard Sister  Alive    Brother  Alive    Sister  Alive    Brother  Alive          SOCIAL HISTORY      reports that she has never smoked. She has never used smokeless tobacco. She reports current alcohol use. She reports that she does not use drugs. PHYSICAL EXAM    (up to 7 for level 4, 8 or more for level 5)     Vitals:    20 1545   BP: 106/75   Pulse: 78   Resp: 16   Temp: 98.3 °F (36.8 °C)   TempSrc: Temporal   SpO2: 98%   Weight: 210 lb (95.3 kg)   Height: 5' 3\" (1.6 m)         Physical Exam  Vitals signs and nursing note reviewed. Constitutional:       Appearance: Normal appearance. HENT:      Head: Normocephalic and atraumatic. Right Ear: External ear normal.      Left Ear: External ear normal.      Nose: No congestion. Mouth/Throat:      Mouth: Mucous membranes are moist.   Eyes:      Extraocular Movements: Extraocular movements intact. Conjunctiva/sclera: Conjunctivae normal.      Pupils: Pupils are equal, round, and reactive to light. Cardiovascular:      Rate and Rhythm: Normal rate. Pulses: Normal pulses. Pulmonary:      Effort: Pulmonary effort is normal.   Abdominal:      Palpations: Abdomen is soft. Skin:     General: Skin is warm and dry. Neurological:      Mental Status: She is alert and oriented to person, place, and time. DIFFERENTIAL DIAGNOSIS:       Bella Juarez reviewed the disposition diagnosis with the patient and or their family/guardian.   I have answered COVID-19 limit contact with animals until more information is known about the virus. ; When possible, have another member of your household care for your animals while you are sick. If you are sick with COVID-19, avoid contact with your pet, including petting, snuggling, being kissed or licked, and sharing food. If you must care for your pet or be around animals while you are sick, wash your hands before and after you interact with pets and wear a facemask. See COVID-19 and Animals for more information. Other considerations   The ill person should eat/be fed in their room if possible. Non-disposable  items used should be handled with gloves and washed with hot water or in a . Clean hands after handling used  items.  If possible, dedicate a lined trash can for the ill person. Use gloves when removing garbage bags, handling, and disposing of trash. Wash hands after handling or disposing of trash.  Consider consulting with your local health department about trash disposal guidance if available. Information for Household Members and Caregivers of Someone who is Sick   Call ahead before visiting your doctor   Call ahead: If you have a medical appointment, call the healthcare provider and tell them that you have or may have COVID-19. This will help the healthcare provider's office take steps to keep other people from getting infected or exposed. Wear a facemask if you are sick   ; If you are sick: You should wear a facemask when you are around other people (e.g., sharing a room or vehicle) or pets and before you enter a healthcare provider's office. ; If you are caring for others: If the person who is sick is not able to wear a facemask (for example, because it causes trouble breathing), then people who live with the person who is sick should not stay in the same room with them, or they should wear a facemask if they enter a room with the person who is sick.   Cover your toilets, phones, keyboards, tablets, and bedside tables.  ; Disinfect areas with bodily fluids: Also, clean any surfaces that may have blood, stool, or body fluids on them.   ; Household : Use a household cleaning spray or wipe, according to the label instructions. Labels contain instructions for safe and effective use of the cleaning product including precautions you should take when applying the product, such as wearing gloves and making sure you have good ventilation during use of the product. Monitor your symptoms   Seek medical attention: Seek prompt medical attention if your illness is worsening     (e.g., difficulty breathing).   ; Call your doctor: Before seeking care, call your healthcare provider and tell them that you have, or are being evaluated for, COVID-19.   ; Wear a facemask when sick: Put on a facemask before you enter the facility. These steps will help the healthcare provider's office to keep other people in the office or waiting room from getting infected or exposed. ; Alert health department: Ask your healthcare provider to call the local or [facility] health department. Persons who are placed under active monitoring or facilitated self-monitoring should follow instructions provided by their local health department or occupational health professionals, as appropriate.  ; Call 911 if you have a medical emergency: If you have a medical emergency and need to call 911, notify the dispatch personnel that you have, or are being evaluated for COVID-19. If possible, put on a facemask before emergency medical services arrive. Patient instructed to return to the office if symptoms worsen, return, or have any other concerns. Patient understands and is agreeable.          Gricelda Soto PA-C 12/29/2020 4:01 PM

## 2021-01-01 LAB — SARS-COV-2, NAA: DETECTED

## 2021-01-05 ENCOUNTER — HOSPITAL ENCOUNTER (OUTPATIENT)
Age: 51
Discharge: HOME | End: 2021-01-07
Payer: MEDICARE

## 2021-01-05 ENCOUNTER — HOSPITAL ENCOUNTER (OUTPATIENT)
Dept: GENERAL RADIOLOGY | Age: 51
Discharge: HOME | End: 2021-01-07
Payer: MEDICARE

## 2021-01-05 DIAGNOSIS — R06.2 WHEEZING: ICD-10-CM

## 2021-01-05 DIAGNOSIS — R07.89 CHEST PRESSURE: ICD-10-CM

## 2021-01-05 PROCEDURE — 71046 X-RAY EXAM CHEST 2 VIEWS: CPT

## 2021-01-05 NOTE — RESULT ENCOUNTER NOTE
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

## 2021-02-13 ENCOUNTER — HOSPITAL ENCOUNTER (OUTPATIENT)
Age: 51
Discharge: HOME | End: 2021-02-13
Payer: MEDICARE

## 2021-02-13 ENCOUNTER — TELEPHONE (OUTPATIENT)
Dept: GASTROENTEROLOGY | Age: 51
End: 2021-02-13

## 2021-02-13 DIAGNOSIS — R10.9 ACUTE FLANK PAIN: ICD-10-CM

## 2021-02-13 DIAGNOSIS — R53.82 CHRONIC FATIGUE: ICD-10-CM

## 2021-02-13 DIAGNOSIS — E78.5 HYPERLIPIDEMIA WITH TARGET LDL LESS THAN 100: ICD-10-CM

## 2021-02-13 DIAGNOSIS — E53.8 VITAMIN B 12 DEFICIENCY: ICD-10-CM

## 2021-02-13 DIAGNOSIS — E55.9 VITAMIN D DEFICIENCY: ICD-10-CM

## 2021-02-13 DIAGNOSIS — R31.29 OTHER MICROSCOPIC HEMATURIA: ICD-10-CM

## 2021-02-13 DIAGNOSIS — Z12.11 SCREEN FOR COLON CANCER: Primary | ICD-10-CM

## 2021-02-13 LAB
-: ABNORMAL
ALBUMIN SERPL-MCNC: 4.1 G/DL (ref 3.5–5.2)
ALBUMIN/GLOBULIN RATIO: ABNORMAL (ref 1–2.5)
ALP BLD-CCNC: 82 U/L (ref 35–104)
ALT SERPL-CCNC: 34 U/L (ref 5–33)
AMORPHOUS: ABNORMAL
ANION GAP SERPL CALCULATED.3IONS-SCNC: 11 MMOL/L (ref 9–17)
AST SERPL-CCNC: 23 U/L
BACTERIA: ABNORMAL
BILIRUB SERPL-MCNC: 0.44 MG/DL (ref 0.3–1.2)
BILIRUBIN URINE: NEGATIVE
BUN BLDV-MCNC: 12 MG/DL (ref 6–20)
BUN/CREAT BLD: ABNORMAL (ref 9–20)
CALCIUM SERPL-MCNC: 9.5 MG/DL (ref 8.6–10.4)
CASTS UA: ABNORMAL /LPF
CHLORIDE BLD-SCNC: 104 MMOL/L (ref 98–107)
CHOLESTEROL/HDL RATIO: 4.1
CHOLESTEROL: 188 MG/DL
CO2: 25 MMOL/L (ref 20–31)
COLOR: YELLOW
COMMENT UA: ABNORMAL
CREAT SERPL-MCNC: 0.85 MG/DL (ref 0.5–0.9)
CRYSTALS, UA: ABNORMAL /HPF
EPITHELIAL CELLS UA: ABNORMAL /HPF
FOLATE: 14.9 NG/ML
GFR AFRICAN AMERICAN: >60 ML/MIN
GFR NON-AFRICAN AMERICAN: >60 ML/MIN
GFR SERPL CREATININE-BSD FRML MDRD: ABNORMAL ML/MIN/{1.73_M2}
GFR SERPL CREATININE-BSD FRML MDRD: ABNORMAL ML/MIN/{1.73_M2}
GLUCOSE BLD-MCNC: 111 MG/DL (ref 70–99)
GLUCOSE URINE: NEGATIVE
HCT VFR BLD CALC: 40.2 % (ref 36–46)
HDLC SERPL-MCNC: 46 MG/DL
HEMOGLOBIN: 13.3 G/DL (ref 12–16)
KETONES, URINE: NEGATIVE
LDL CHOLESTEROL: 117 MG/DL (ref 0–130)
LEUKOCYTE ESTERASE, URINE: NEGATIVE
MCH RBC QN AUTO: 30.3 PG (ref 26–34)
MCHC RBC AUTO-ENTMCNC: 33 G/DL (ref 31–37)
MCV RBC AUTO: 92 FL (ref 80–100)
MUCUS: ABNORMAL
NITRITE, URINE: NEGATIVE
NRBC AUTOMATED: NORMAL PER 100 WBC
OTHER OBSERVATIONS UA: ABNORMAL
PDW BLD-RTO: 13.8 % (ref 11.5–14.9)
PH UA: 5.5 (ref 5–8)
PLATELET # BLD: 200 K/UL (ref 150–450)
PMV BLD AUTO: 8.7 FL (ref 6–12)
POTASSIUM SERPL-SCNC: 4.1 MMOL/L (ref 3.7–5.3)
PROTEIN UA: NEGATIVE
RBC # BLD: 4.37 M/UL (ref 4–5.2)
RBC UA: ABNORMAL /HPF
RENAL EPITHELIAL, UA: ABNORMAL /HPF
SODIUM BLD-SCNC: 140 MMOL/L (ref 135–144)
SPECIFIC GRAVITY UA: 1.02 (ref 1–1.03)
TOTAL PROTEIN: 7.4 G/DL (ref 6.4–8.3)
TRICHOMONAS: ABNORMAL
TRIGL SERPL-MCNC: 127 MG/DL
TSH SERPL DL<=0.05 MIU/L-ACNC: 1.43 MIU/L (ref 0.3–5)
TURBIDITY: ABNORMAL
URINE HGB: ABNORMAL
UROBILINOGEN, URINE: NORMAL
VITAMIN B-12: 621 PG/ML (ref 232–1245)
VITAMIN D 25-HYDROXY: 21.5 NG/ML (ref 30–100)
VLDLC SERPL CALC-MCNC: NORMAL MG/DL (ref 1–30)
WBC # BLD: 7.3 K/UL (ref 3.5–11)
WBC UA: ABNORMAL /HPF
YEAST: ABNORMAL

## 2021-02-13 PROCEDURE — 36415 COLL VENOUS BLD VENIPUNCTURE: CPT

## 2021-02-13 PROCEDURE — 80053 COMPREHEN METABOLIC PANEL: CPT

## 2021-02-13 PROCEDURE — 82607 VITAMIN B-12: CPT

## 2021-02-13 PROCEDURE — 84443 ASSAY THYROID STIM HORMONE: CPT

## 2021-02-13 PROCEDURE — 81001 URINALYSIS AUTO W/SCOPE: CPT

## 2021-02-13 PROCEDURE — 82746 ASSAY OF FOLIC ACID SERUM: CPT

## 2021-02-13 PROCEDURE — 82306 VITAMIN D 25 HYDROXY: CPT

## 2021-02-13 PROCEDURE — 85027 COMPLETE CBC AUTOMATED: CPT

## 2021-02-13 PROCEDURE — 80061 LIPID PANEL: CPT

## 2021-02-14 DIAGNOSIS — E55.9 VITAMIN D DEFICIENCY: ICD-10-CM

## 2021-02-14 DIAGNOSIS — K75.2 NONSPECIFIC REACTIVE HEPATITIS: Primary | ICD-10-CM

## 2021-02-14 RX ORDER — ERGOCALCIFEROL 1.25 MG/1
50000 CAPSULE ORAL WEEKLY
Qty: 12 CAPSULE | Refills: 0 | Status: SHIPPED | OUTPATIENT
Start: 2021-02-14 | End: 2021-07-19

## 2021-02-14 NOTE — RESULT ENCOUNTER NOTE
ABNORMAL. Please notify patient. Trace blood in the urine, seen by Dr. Beto Palacio already, follow up with him in 1 yr as he said. Drink more water, 8 x 8 oz glasses of water daily. Vitamin D very low, new prescription for high-dose vitamin D weekly for 3 months sent at the pharmacy, needs to take it with food. Blood Glucose 111 mildly high, low carb diet advised  One liver test increased mildly, low carb low fat diet, stop drinking pop. US liver ordered  Otherwise labs within normal limits    No results found for: HAV, HEPAIGM, HEPBIGM, HEPBCAB, HBEAG, HEPCAB    Lab Results       Component                Value               Date                       LABA1C                   5.6                 04/08/2019                        No future appointments.

## 2021-02-15 RX ORDER — BISACODYL 5 MG/1
TABLET, DELAYED RELEASE ORAL
Qty: 4 TABLET | Refills: 0 | Status: SHIPPED | OUTPATIENT
Start: 2021-02-15 | End: 2021-10-11

## 2021-02-15 RX ORDER — POLYETHYLENE GLYCOL 3350 17 G/17G
POWDER, FOR SOLUTION ORAL
Qty: 238 G | Refills: 0 | Status: SHIPPED | OUTPATIENT
Start: 2021-02-15 | End: 2021-10-11

## 2021-03-10 ENCOUNTER — HOSPITAL ENCOUNTER (OUTPATIENT)
Dept: ULTRASOUND IMAGING | Age: 51
Discharge: HOME | End: 2021-03-12
Payer: MEDICARE

## 2021-03-10 DIAGNOSIS — K75.2 NONSPECIFIC REACTIVE HEPATITIS: ICD-10-CM

## 2021-03-10 PROBLEM — K76.0 FATTY LIVER: Status: ACTIVE | Noted: 2021-03-10

## 2021-03-10 PROCEDURE — 76705 ECHO EXAM OF ABDOMEN: CPT

## 2021-03-10 NOTE — LETTER
[address] [place] [address]. [facility]ly [facility] [address]  Phone: [phone]  Fax: [phone]    MD Britni Diez, MD Michael Yu, MD Marge Haynes, BETO Alexander  [address] [place] [address] Ave    We were unable to reach you by phone. Please call our office at your earliest convenience. This message is regarding: results. Please call between the hours of 8:30am and 4:00pm Monday through Friday if possible. If you have any questions or concerns, please don't hesitate to call. Sincerely,      Mary [address] at [facility].

## 2021-03-12 ENCOUNTER — OFFICE VISIT (OUTPATIENT)
Dept: FAMILY MEDICINE CLINIC | Age: 51
End: 2021-03-12
Payer: MEDICARE

## 2021-03-12 VITALS
OXYGEN SATURATION: 98 % | HEART RATE: 68 BPM | HEIGHT: 63 IN | SYSTOLIC BLOOD PRESSURE: 110 MMHG | TEMPERATURE: 99.2 F | DIASTOLIC BLOOD PRESSURE: 64 MMHG | BODY MASS INDEX: 38.8 KG/M2 | WEIGHT: 219 LBS

## 2021-03-12 DIAGNOSIS — M51.36 DDD (DEGENERATIVE DISC DISEASE), LUMBAR: ICD-10-CM

## 2021-03-12 DIAGNOSIS — E55.9 VITAMIN D DEFICIENCY: ICD-10-CM

## 2021-03-12 DIAGNOSIS — F33.42 RECURRENT MAJOR DEPRESSIVE DISORDER, IN FULL REMISSION (HCC): ICD-10-CM

## 2021-03-12 DIAGNOSIS — M79.7 FIBROMYALGIA: ICD-10-CM

## 2021-03-12 DIAGNOSIS — K76.0 FATTY LIVER: Primary | ICD-10-CM

## 2021-03-12 PROBLEM — K75.2 NONSPECIFIC REACTIVE HEPATITIS: Status: RESOLVED | Noted: 2021-02-14 | Resolved: 2021-03-12

## 2021-03-12 PROCEDURE — G8484 FLU IMMUNIZE NO ADMIN: HCPCS | Performed by: FAMILY MEDICINE

## 2021-03-12 PROCEDURE — 99214 OFFICE O/P EST MOD 30 MIN: CPT | Performed by: FAMILY MEDICINE

## 2021-03-12 PROCEDURE — 3017F COLORECTAL CA SCREEN DOC REV: CPT | Performed by: FAMILY MEDICINE

## 2021-03-12 PROCEDURE — G8417 CALC BMI ABV UP PARAM F/U: HCPCS | Performed by: FAMILY MEDICINE

## 2021-03-12 PROCEDURE — 1036F TOBACCO NON-USER: CPT | Performed by: FAMILY MEDICINE

## 2021-03-12 PROCEDURE — G8427 DOCREV CUR MEDS BY ELIG CLIN: HCPCS | Performed by: FAMILY MEDICINE

## 2021-03-12 RX ORDER — GABAPENTIN 100 MG/1
CAPSULE ORAL
Qty: 18 CAPSULE | Refills: 0 | Status: SHIPPED | OUTPATIENT
Start: 2021-03-12 | End: 2021-08-26 | Stop reason: ALTCHOICE

## 2021-03-12 RX ORDER — LANOLIN ALCOHOL/MO/W.PET/CERES
400 CREAM (GRAM) TOPICAL DAILY
Qty: 30 TABLET | Refills: 3 | Status: SHIPPED | OUTPATIENT
Start: 2021-03-12 | End: 2021-12-14

## 2021-03-12 RX ORDER — BACLOFEN 10 MG/1
10 TABLET ORAL 3 TIMES DAILY PRN
Qty: 90 TABLET | Refills: 3 | Status: SHIPPED | OUTPATIENT
Start: 2021-03-12 | End: 2021-08-26 | Stop reason: ALTCHOICE

## 2021-03-12 RX ORDER — VITAMIN E 268 MG
400 CAPSULE ORAL DAILY
Qty: 30 CAPSULE | Refills: 3 | Status: SHIPPED | OUTPATIENT
Start: 2021-03-12 | End: 2021-11-15

## 2021-03-12 ASSESSMENT — ENCOUNTER SYMPTOMS
BACK PAIN: 1
COUGH: 0
VOMITING: 0
WHEEZING: 0
NAUSEA: 0
CHEST TIGHTNESS: 0
DIARRHEA: 0
ABDOMINAL PAIN: 0
ABDOMINAL DISTENTION: 0
SHORTNESS OF BREATH: 0
CONSTIPATION: 0

## 2021-03-12 ASSESSMENT — PATIENT HEALTH QUESTIONNAIRE - PHQ9
SUM OF ALL RESPONSES TO PHQ QUESTIONS 1-9: 0
2. FEELING DOWN, DEPRESSED OR HOPELESS: 0
1. LITTLE INTEREST OR PLEASURE IN DOING THINGS: 0

## 2021-03-12 NOTE — PROGRESS NOTES
Basilia Sung (:  1970) is a 48 y.o. female,Established patient, here for evaluation of the following chief complaint(s): Elevated Hepatic Enzymes (LIVER ULTRASOUND ) and Pain      ASSESSMENT/PLAN:    1. Fatty liver  NEW   Ultrasound liver showed fatty liver, discussed diagnosis, discussed to improve low-carb low-fat diet, attempt to lose weight, could start vitamin E 400 units daily  Recheck labs and rule out infectious hepatitis  -     Hepatic Function Panel; Future  -     Hepatitis Panel, Acute; Future  Avoid Cymbalta due to increased LFTs    2. Fibromyalgia  Worsening  - start    magnesium oxide (MAG-OX) 400 (240 Mg) MG tablet; Take 1 tablet by mouth daily, Disp-30 tablet, R-3Normal  -  start   gabapentin (NEURONTIN) 100 MG capsule; Take 1 capsule by mouth nightly for 3 days, THEN 2 capsules every evening for 3 days, THEN 3 capsules every evening for 3 days. , Disp-18 capsule, R-0Normal  -  -     baclofen (LIORESAL) 10 MG tablet; Take 1 tablet by mouth 3 times daily as needed (MUSCLE SPASMS) Causes sedation, do not drive while taking this medication, Disp-90 tablet, R-3Normal  Home stretching, yoga, sepideh chi, heating pad, topical creams discussed  3. DDD (degenerative disc disease), lumbar  Likely worsening due to wear and tear nature of the disease.   -     baclofen (LIORESAL) 10 MG tablet; Take 1 tablet by mouth 3 times daily as needed (MUSCLE SPASMS) Causes sedation, do not drive while taking this medication, Disp-90 tablet, R-3Normal  4. Recurrent major depressive disorder, in full remission ([facility] [address]  Stable  Continue BuSpar for anxiety  We will continue to monitor  5.  Vitamin D deficiency  New  Recently started on vitamin D supplementation, take with food      Controlled Substance Monitoring:    Acute and Chronic Pain Monitoring:   RX Monitoring 3/12/2021   Attestation -   Periodic Controlled Substance Monitoring Possible medication side effects, risk of tolerance/dependence & alternative treatments discussed. ;No signs of potential drug abuse or diversion identified. ;Assessed functional status. Skip received counseling on the following healthy behaviors: nutrition, exercise, medication adherence and weight loss    Reviewed prior labs and health maintenance  Discussed use, benefit, and side effects of prescribed medications. Barriers to medication compliance addressed. Patient given educational materials - see patient instructions  All patient questions answered. Patient voiced understanding. The patient's past medical,surgical, social, and family history as well as her current medications and allergies were reviewed as documented in today's encounter. Medications, labs, diagnostic studies, consultations and follow-up as documented in this encounter. Return in 3 months (on 6/12/2021) for Face-2F-30mins PHYSICAL, VISION screen, PHQ9. .    Data Unavailable      SUBJECTIVE/OBJECTIVE:    HPI  Fatty liver on liver ultrasound   Discussed ultrasound results, just fatty liver. She is asking if there is any fibrosis and I explained to her that this ultrasound does not show and cannot evaluate for fibrosis  Increased liver function tests   Denies nausea, vomiting, diarrhea or constipation. Denies abdominal pain        Lab Results   Component Value Date    ALT 34 (H) 02/13/2021    AST 23 02/13/2021    ALKPHOS 82 02/13/2021    BILITOT 0.44 02/13/2021     No results found for: HAV, HEPAIGM, HEPBIGM, HEPBCAB, HBEAG, HEPCAB    Skip complains of Fibromyalgia worse at nighttime and in the morning, feels like deep pain, feels like hips broken in the morning and stiff, hard to move when waking up. Was on cymbalta before, doesn't remember if It helped. Baclofen prn helps a little, but doesn't help enough. I suggested Gabapentin, and she says she took it once and it helped a lot, she says she was able to even climb up the stairs without pain.     Chest pressure sometimes, sometimes palpitations   Never got the EKG done, Reprinted EKG and advised to have done. Has been feeling tired all the time, it takes her a while to fall asleep and wakes up tired. Pain interferes with sleep and activities. Intensity of pain is 5/10, goes to 8 out of 10.  10/26/2020  2   10/26/2020  Gabapentin 100 MG Capsule  18.00  [facility]   [id number]   [facility] (1367)   0      MRI cervical spine 5/2/2019 reviewed showed arthritis, multilevel degenerative disc disease, moderate right and severe left foraminal narrowing C6-C7. She does admit to neck pain   MRI lumbar spine also show degenerative changes  She does say the pain in her in the neck and lower back and hips is worse when having flaring up of fibromyalgia      Skip has Vitamin D deficiency. Skip  is  taking Vitamin D supplementation, just started    she feels tired. Lab Results   Component Value Date    VITD25 21.5 (L) 02/13/2021       Lab Results   Component Value Date    MG 2.1 11/01/2014     Depression is in remission    Denies suicidal ideation, plan or intent. Does take BuSpar for anxiety  PHQ-2 Over the past 2 weeks, how often have you been bothered by any of the following problems? Little interest or pleasure in doing things: Not at all  Feeling down, depressed, or hopeless: Not at all  PHQ-2 Score: 0  PHQ-9 Over the past 2 weeks, how often have you been bothered by any of the following problems? PHQ-9 Total Score: 0      PHQ Scores 3/12/2021 9/15/2020 11/7/2018 1/27/2015   PHQ2 Score 0 0 6 6   PHQ9 Score 0 0 15 19         Prior to Visit Medications    Medication Sig Taking? Authorizing Provider   polyethylene glycol (MIRALAX) 17 GM/SCOOP powder Use as Directed per instructions provided by physician office.  Yes Duane Mcfarland MD   vitamin D (ERGOCALCIFEROL) 1.25 MG (92608 UT) CAPS capsule Take 1 capsule by mouth once a week Yes Fay Aragon MD   camphor-menthol-methyl salicylate (BENGAY ULTRA STRENGTH) 4-10-30 % CREA cream Apply topically 3 times daily as needed for Pain Yes Floyd Bird MD   Omega-3 Fatty Acids (FISH OIL PO) Take by mouth daily Yes Historical Provider, MD   IBUPROFEN PO Take by mouth as needed Yes Historical Provider, MD   Multiple Vitamins-Minerals (IMMUNE SUPPORT PO) Take by mouth daily Yes Historical Provider, MD   oxybutynin (DITROPAN-XL) 10 MG extended release tablet Take 1 tablet by mouth daily Yes Floyd Bird MD   busPIRone (BUSPAR) 10 MG tablet Take 1 tablet by mouth every evening Yes Floyd Bird MD   baclofen (LIORESAL) 10 MG tablet Take 1 tablet by mouth 3 times daily as needed (MUSCLE SPASMS) Causes sedation, do not drive while taking this medication Yes Floyd Bird MD   lidocaine (LMX) 4 % cream Apply 2-3 times a day as needed for pain Yes Floyd Bird MD   diclofenac sodium (VOLTAREN) 1 % GEL Apply 2 g topically 4 times daily as needed for Pain Yes Floyd Bird MD   bisacodyl (DULCOLAX) 5 MG EC tablet Use as directed per instructions provided by physician office  Patient not taking: Reported on 3/12/2021  Maria Luz Orantes MD       Social History     Tobacco Use    Smoking status: Never Smoker    Smokeless tobacco: Never Used   Substance Use Topics    Alcohol use: Yes     Alcohol/week: 0.0 standard drinks     Comment: rarely    Drug use: No         Review of Systems   Constitutional: Positive for fatigue. Negative for activity change, appetite change, chills, diaphoresis, fever and unexpected weight change. Respiratory: Negative for cough, chest tightness, shortness of breath and wheezing. Cardiovascular: Negative for chest pain, palpitations and leg swelling. Gastrointestinal: Negative for abdominal distention, abdominal pain, constipation, diarrhea, nausea and vomiting. Endocrine: Negative for cold intolerance, heat intolerance, polydipsia, polyphagia and polyuria.    Musculoskeletal: Positive for arthralgias, back pain, myalgias and neck pain.   Hematological: Does not bruise/bleed easily. Psychiatric/Behavioral: Negative for dysphoric mood and sleep disturbance. The patient is nervous/anxious.            -vital signs stable and within normal limits except severe Obesity per BMI. /64   Pulse 68   Temp 99.2 °F (37.3 °C)   Ht 5' 3\" (1.6 m)   Wt 219 lb (99.3 kg)   SpO2 98%   BMI 38.79 kg/m²      Physical Exam  Vitals signs and nursing note reviewed. Constitutional:       General: She is not in acute distress. Appearance: Normal appearance. She is well-developed. She is obese. She is not diaphoretic. HENT:      Head: Normocephalic and atraumatic. Right Ear: External ear normal.      Left Ear: External ear normal.      Nose: No mucosal edema. Mouth/Throat:      Comments: I did not examine the mouth due to coronavirus pandemic and wearing masks    Eyes:      General: Lids are normal. No scleral icterus. Right eye: No discharge. Left eye: No discharge. Extraocular Movements: Extraocular movements intact. Conjunctiva/sclera: Conjunctivae normal.   Neck:      Musculoskeletal: Normal range of motion and neck supple. Thyroid: No thyromegaly. Cardiovascular:      Rate and Rhythm: Normal rate and regular rhythm. Heart sounds: Normal heart sounds. No murmur. Pulmonary:      Effort: Pulmonary effort is normal. No respiratory distress. Breath sounds: Normal breath sounds. No wheezing or rales. Chest:      Chest wall: No tenderness. Abdominal:      General: Bowel sounds are normal. There is no distension. Palpations: Abdomen is soft. There is no hepatomegaly or splenomegaly. Tenderness: There is no abdominal tenderness. Comments: Obese abdomen   Musculoskeletal:      Cervical back: She exhibits decreased range of motion, tenderness, pain and spasm. Thoracic back: She exhibits decreased range of motion, tenderness, pain and spasm.       Lumbar back: She exhibits decreased range of motion, tenderness, pain and spasm. Right lower leg: No edema. Left lower leg: No edema. Comments: Hypersensitivity noted    Skin:     General: Skin is warm and dry. Capillary Refill: Capillary refill takes less than 2 seconds. Findings: No rash. Neurological:      Mental Status: She is alert and oriented to person, place, and time. Cranial Nerves: No cranial nerve deficit. Motor: No abnormal muscle tone. Psychiatric:         Mood and Affect: Mood normal.         Behavior: Behavior normal.         Thought Content: Thought content normal.         Judgment: Judgment normal.             I personally reviewed testing . Discussed testing with the patient and all questions fully answered.   Increased liver function tests   Vitamin D deficiency   Hyperglycemia    Otherwise labs within normal limits        Lab Results   Component Value Date    WBC 7.3 02/13/2021    HGB 13.3 02/13/2021    HCT 40.2 02/13/2021    MCV 92.0 02/13/2021     02/13/2021       Lab Results   Component Value Date     02/13/2021    K 4.1 02/13/2021     02/13/2021    CO2 25 02/13/2021    BUN 12 02/13/2021    CREATININE 0.85 02/13/2021    GLUCOSE 111 02/13/2021    CALCIUM 9.5 02/13/2021        Lab Results   Component Value Date    ALT 34 (H) 02/13/2021    AST 23 02/13/2021    ALKPHOS 82 02/13/2021    BILITOT 0.44 02/13/2021       Lab Results   Component Value Date    TSH 1.43 02/13/2021       Lab Results   Component Value Date    CHOL 188 02/13/2021     Lab Results   Component Value Date    TRIG 127 02/13/2021     Lab Results   Component Value Date    HDL 46 02/13/2021    HDL 50 04/09/2019     Lab Results   Component Value Date    LDLCHOLESTEROL 117 02/13/2021    LDLCHOLESTEROL 125 04/09/2019     Lab Results   Component Value Date    CHOLHDLRATIO 4.1 02/13/2021    CHOLHDLRATIO 4.0 04/09/2019       Lab Results   Component Value Date    LABA1C 5.6 04/08/2019       Lab Results Component Value Date    LVOCHPBO80 621 02/13/2021       Lab Results   Component Value Date    FOLATE 14.9 02/13/2021       Lab Results   Component Value Date    VITD25 21.5 (L) 02/13/2021       Orders Placed This Encounter   Procedures    Hepatic Function Panel     Standing Status:   Future     Standing Expiration Date:   3/12/2022    Hepatitis Panel, Acute     Standing Status:   Future     Standing Expiration Date:   3/12/2022       Orders Placed This Encounter   Medications    magnesium oxide (MAG-OX) 400 (240 Mg) MG tablet     Sig: Take 1 tablet by mouth daily     Dispense:  30 tablet     Refill:  3    gabapentin (NEURONTIN) 100 MG capsule     Sig: Take 1 capsule by mouth nightly for 3 days, THEN 2 capsules every evening for 3 days, THEN 3 capsules every evening for 3 days. Dispense:  18 capsule     Refill:  0    vitamin E 400 UNIT capsule     Sig: Take 1 capsule by mouth daily     Dispense:  30 capsule     Refill:  3    baclofen (LIORESAL) 10 MG tablet     Sig: Take 1 tablet by mouth 3 times daily as needed (MUSCLE SPASMS) Causes sedation, do not drive while taking this medication     Dispense:  90 tablet     Refill:  3       Medications Discontinued During This Encounter   Medication Reason    baclofen (LIORESAL) 10 MG tablet REORDER         Return in 3 months (on 6/12/2021) for Face-2F-30mins PHYSICAL, VISION screen, PHQ9. .    On this date 3/12/2021 I have spent  30 minutes reviewing previous notes, test results and face to face with the patient discussing the diagnosis and importance of compliance with the treatment plan as well as documenting on the day of the visit.     Future Appointments   Date Time Provider Derek Rosas   3/13/2021 10:10 AM SCHEDULE, [facility] SCREENING [facility]   8/6/2021  9:30 AM Monisha Lawrence MD fp sc [facility]       This note was completed by using the assistance of a speech-recognition program. However, inadvertent computerized transcription errors

## 2021-03-12 NOTE — PROGRESS NOTES
Visit Information    Have you changed or started any medications since your last visit including any over-the-counter medicines, vitamins, or herbal medicines? no   Are you having any side effects from any of your medications? -  no  Have you stopped taking any of your medications? Is so, why? -  no    Have you seen any other physician or provider since your last visit? No  Have you had any other diagnostic tests since your last visit? Yes - Records Obtained  Have you been seen in the emergency room and/or had an admission to a hospital since we last saw you? No  Have you had your routine dental cleaning in the past 6 months? no    Have you activated your [employer] account? If not, what are your barriers?  Yes     Patient Care Team:  Joyce Arias MD as PCP - General (Family Medicine)  Joyce Arias MD as PCP - [facility]  Esperanza Jennings MD as Consulting Physician (Cardiology)  Ghada Chahal MD as Consulting Physician (Neurology)  Mónica Daniels MD as Consulting Physician (Urology)    Medical History Review  Past Medical, Family, and Social History reviewed and does contribute to the patient presenting condition    Health Maintenance   Topic Date Due    Hepatitis C screen  Never done    COVID-19 Vaccine (1) Never done    Cervical cancer screen  03/16/2018    Flu vaccine (1) 09/01/2020    Shingles Vaccine (1 of 2) Never done    Colon cancer screen colonoscopy  Never done    Breast cancer screen  10/03/2021    Diabetes screen  04/08/2022    Lipid screen  02/13/2026    DTaP/Tdap/Td vaccine (3 - Td) 01/09/2028    HIV screen  Completed    Hepatitis A vaccine  Aged Out    Hepatitis B vaccine  Aged Out    Hib vaccine  Aged Out    Meningococcal (ACWY) vaccine  Aged Out    Pneumococcal 0-64 years Vaccine  Aged Out

## 2021-03-13 ENCOUNTER — HOSPITAL ENCOUNTER (OUTPATIENT)
Dept: LAB | Age: 51
Setting detail: SPECIMEN
Discharge: HOME | End: 2021-03-13
Payer: MEDICARE

## 2021-03-13 DIAGNOSIS — Z01.818 PREOP TESTING: Primary | ICD-10-CM

## 2021-07-18 DIAGNOSIS — E55.9 VITAMIN D DEFICIENCY: ICD-10-CM

## 2021-07-19 RX ORDER — ERGOCALCIFEROL 1.25 MG/1
CAPSULE ORAL
Qty: 12 CAPSULE | Refills: 0 | Status: SHIPPED | OUTPATIENT
Start: 2021-07-19 | End: 2021-09-13

## 2021-08-23 ENCOUNTER — APPOINTMENT (OUTPATIENT)
Dept: CT IMAGING | Age: 51
End: 2021-08-23
Payer: MEDICARE

## 2021-08-23 ENCOUNTER — HOSPITAL ENCOUNTER (EMERGENCY)
Age: 51
Discharge: HOME | End: 2021-08-23
Attending: EMERGENCY MEDICINE
Payer: MEDICARE

## 2021-08-23 ENCOUNTER — NURSE TRIAGE (OUTPATIENT)
Dept: OTHER | Facility: CLINIC | Age: 51
End: 2021-08-23

## 2021-08-23 VITALS
OXYGEN SATURATION: 98 % | DIASTOLIC BLOOD PRESSURE: 102 MMHG | HEIGHT: 63 IN | SYSTOLIC BLOOD PRESSURE: 123 MMHG | HEART RATE: 74 BPM | RESPIRATION RATE: 17 BRPM | TEMPERATURE: 98 F | BODY MASS INDEX: 37.21 KG/M2 | WEIGHT: 210 LBS

## 2021-08-23 DIAGNOSIS — R35.0 URINARY FREQUENCY: ICD-10-CM

## 2021-08-23 DIAGNOSIS — M54.50 ACUTE EXACERBATION OF CHRONIC LOW BACK PAIN: Primary | ICD-10-CM

## 2021-08-23 DIAGNOSIS — G89.29 ACUTE EXACERBATION OF CHRONIC LOW BACK PAIN: Primary | ICD-10-CM

## 2021-08-23 LAB
ABSOLUTE EOS #: 0.1 K/UL (ref 0–0.4)
ABSOLUTE IMMATURE GRANULOCYTE: ABNORMAL K/UL (ref 0–0.3)
ABSOLUTE LYMPH #: 2.6 K/UL (ref 1–4.8)
ABSOLUTE MONO #: 0.6 K/UL (ref 0.1–1.3)
ANION GAP SERPL CALCULATED.3IONS-SCNC: 13 MMOL/L (ref 9–17)
BASOPHILS # BLD: 1 % (ref 0–2)
BASOPHILS ABSOLUTE: 0.1 K/UL (ref 0–0.2)
BILIRUBIN URINE: NEGATIVE
BUN BLDV-MCNC: 13 MG/DL (ref 6–20)
BUN/CREAT BLD: ABNORMAL (ref 9–20)
CALCIUM SERPL-MCNC: 9.6 MG/DL (ref 8.6–10.4)
CHLORIDE BLD-SCNC: 102 MMOL/L (ref 98–107)
CO2: 22 MMOL/L (ref 20–31)
COLOR: YELLOW
COMMENT UA: NORMAL
CREAT SERPL-MCNC: 0.87 MG/DL (ref 0.5–0.9)
DIFFERENTIAL TYPE: ABNORMAL
EOSINOPHILS RELATIVE PERCENT: 1 % (ref 0–4)
GFR AFRICAN AMERICAN: >60 ML/MIN
GFR NON-AFRICAN AMERICAN: >60 ML/MIN
GFR SERPL CREATININE-BSD FRML MDRD: ABNORMAL ML/MIN/{1.73_M2}
GFR SERPL CREATININE-BSD FRML MDRD: ABNORMAL ML/MIN/{1.73_M2}
GLUCOSE BLD-MCNC: 103 MG/DL (ref 70–99)
GLUCOSE URINE: NEGATIVE
HCT VFR BLD CALC: 44.2 % (ref 36–46)
HEMOGLOBIN: 14.7 G/DL (ref 12–16)
IMMATURE GRANULOCYTES: ABNORMAL %
KETONES, URINE: NEGATIVE
LEUKOCYTE ESTERASE, URINE: NEGATIVE
LYMPHOCYTES # BLD: 31 % (ref 24–44)
MCH RBC QN AUTO: 30.4 PG (ref 26–34)
MCHC RBC AUTO-ENTMCNC: 33.3 G/DL (ref 31–37)
MCV RBC AUTO: 91.3 FL (ref 80–100)
MONOCYTES # BLD: 8 % (ref 1–7)
NITRITE, URINE: NEGATIVE
NRBC AUTOMATED: ABNORMAL PER 100 WBC
PDW BLD-RTO: 13.4 % (ref 11.5–14.9)
PH UA: 6.5 (ref 5–8)
PLATELET # BLD: 218 K/UL (ref 150–450)
PLATELET ESTIMATE: ABNORMAL
PMV BLD AUTO: 8.7 FL (ref 6–12)
POTASSIUM SERPL-SCNC: 4.8 MMOL/L (ref 3.7–5.3)
PROTEIN UA: NEGATIVE
RBC # BLD: 4.85 M/UL (ref 4–5.2)
RBC # BLD: ABNORMAL 10*6/UL
SEG NEUTROPHILS: 59 % (ref 36–66)
SEGMENTED NEUTROPHILS ABSOLUTE COUNT: 5 K/UL (ref 1.3–9.1)
SODIUM BLD-SCNC: 137 MMOL/L (ref 135–144)
SPECIFIC GRAVITY UA: 1.02 (ref 1–1.03)
TURBIDITY: CLEAR
URINE HGB: NEGATIVE
UROBILINOGEN, URINE: NORMAL
WBC # BLD: 8.4 K/UL (ref 3.5–11)
WBC # BLD: ABNORMAL 10*3/UL

## 2021-08-23 PROCEDURE — 81003 URINALYSIS AUTO W/O SCOPE: CPT

## 2021-08-23 PROCEDURE — 96374 THER/PROPH/DIAG INJ IV PUSH: CPT

## 2021-08-23 PROCEDURE — 36415 COLL VENOUS BLD VENIPUNCTURE: CPT

## 2021-08-23 PROCEDURE — 80048 BASIC METABOLIC PNL TOTAL CA: CPT

## 2021-08-23 PROCEDURE — 6360000002 HC RX W HCPCS: Performed by: EMERGENCY MEDICINE

## 2021-08-23 PROCEDURE — 72131 CT LUMBAR SPINE W/O DYE: CPT

## 2021-08-23 PROCEDURE — 99283 EMERGENCY DEPT VISIT LOW MDM: CPT

## 2021-08-23 PROCEDURE — 74176 CT ABD & PELVIS W/O CONTRAST: CPT

## 2021-08-23 PROCEDURE — 85025 COMPLETE CBC W/AUTO DIFF WBC: CPT

## 2021-08-23 RX ORDER — FENTANYL CITRATE 50 UG/ML
50 INJECTION, SOLUTION INTRAMUSCULAR; INTRAVENOUS ONCE
Status: COMPLETED | OUTPATIENT
Start: 2021-08-23 | End: 2021-08-23

## 2021-08-23 RX ORDER — PHENAZOPYRIDINE HYDROCHLORIDE 200 MG/1
200 TABLET, FILM COATED ORAL 3 TIMES DAILY PRN
Qty: 6 TABLET | Refills: 0 | Status: SHIPPED | OUTPATIENT
Start: 2021-08-23 | End: 2021-08-26 | Stop reason: ALTCHOICE

## 2021-08-23 RX ORDER — HYDROCODONE BITARTRATE AND ACETAMINOPHEN 5; 325 MG/1; MG/1
1 TABLET ORAL EVERY 6 HOURS PRN
Qty: 10 TABLET | Refills: 0 | Status: SHIPPED | OUTPATIENT
Start: 2021-08-23 | End: 2021-08-26

## 2021-08-23 RX ORDER — LIDOCAINE 50 MG/G
1 PATCH TOPICAL DAILY
Qty: 7 PATCH | Refills: 0 | Status: SHIPPED | OUTPATIENT
Start: 2021-08-23 | End: 2021-08-30

## 2021-08-23 RX ADMIN — FENTANYL CITRATE 50 MCG: 0.05 INJECTION, SOLUTION INTRAMUSCULAR; INTRAVENOUS at 19:46

## 2021-08-23 ASSESSMENT — ENCOUNTER SYMPTOMS
SHORTNESS OF BREATH: 0
CONSTIPATION: 0
SORE THROAT: 0
DIARRHEA: 0
VOMITING: 0
BLOOD IN STOOL: 0
COLOR CHANGE: 0
TROUBLE SWALLOWING: 0
NAUSEA: 0
BACK PAIN: 1
ABDOMINAL PAIN: 0
COUGH: 0

## 2021-08-23 ASSESSMENT — PAIN DESCRIPTION - LOCATION: LOCATION: BACK

## 2021-08-23 ASSESSMENT — PAIN SCALES - GENERAL: PAINLEVEL_OUTOF10: 9

## 2021-08-23 ASSESSMENT — PAIN DESCRIPTION - PAIN TYPE: TYPE: ACUTE PAIN

## 2021-08-23 NOTE — TELEPHONE ENCOUNTER
Agree with ED evaluation for urinary retention
nausea and urinary frequency. States only able to urinate a few drops at a time. States pain sometimes so bad she feels like she will pass out    Triage indicates for patient to go to ED now    Care advice provided, patient verbalizes understanding; denies any other questions or concerns; instructed to call back for any new or worsening symptoms. Attention Provider: Thank you for allowing me to participate in the care of your patient. The patient was connected to triage in response to information provided to the ECC. Please do not respond through this encounter as the response is not directed to a shared pool.

## 2021-08-23 NOTE — ED PROVIDER NOTES
Musculoskeletal: Positive for back pain. Negative for arthralgias, myalgias and neck pain. Skin: Negative for color change, rash and wound. Neurological: Negative for dizziness, weakness, light-headedness, numbness and headaches. Psychiatric/Behavioral: Negative for confusion. All other systems reviewed and are negative. Negative in 10 essential Systems except as mentioned above and in the HPI. PAST MEDICAL HISTORY     Past Medical History:   Diagnosis Date    Anxiety     unison ,on meds in past     CAD (coronary artery disease) 5/5/2014    Chronic back pain     Depression     post partum and childhood    Fatty liver 3/10/2021    Fibromyalgia     Headache(784.0)     High cholesterol     Palpitations          SURGICAL HISTORY      has a past surgical history that includes Appendectomy; Tubal ligation; Spine surgery; and Cystoscopy (N/A, 5/24/2019). CURRENT MEDICATIONS       Discharge Medication List as of 8/23/2021  9:23 PM      CONTINUE these medications which have NOT CHANGED    Details   vitamin D (ERGOCALCIFEROL) 1.25 MG (46262 UT) CAPS capsule TAKE 1 CAPSULE BY MOUTH 1 TIME A WEEK, Disp-12 capsule, R-0Normal      magnesium oxide (MAG-OX) 400 (240 Mg) MG tablet Take 1 tablet by mouth daily, Disp-30 tablet, R-3Normal      gabapentin (NEURONTIN) 100 MG capsule Take 1 capsule by mouth nightly for 3 days, THEN 2 capsules every evening for 3 days, THEN 3 capsules every evening for 3 days. , Disp-18 capsule, R-0Normal      vitamin E 400 UNIT capsule Take 1 capsule by mouth daily, Disp-30 capsule, R-3Normal      baclofen (LIORESAL) 10 MG tablet Take 1 tablet by mouth 3 times daily as needed (MUSCLE SPASMS) Causes sedation, do not drive while taking this medication, Disp-90 tablet, R-3Normal      polyethylene glycol (MIRALAX) 17 GM/SCOOP powder Use as Directed per instructions provided by physician office. , Disp-238 g, R-0Normal      bisacodyl (DULCOLAX) 5 MG EC tablet Use as directed per instructions provided by physician office, Disp-4 tablet, R-0Normal      camphor-menthol-methyl salicylate (BENGAY ULTRA STRENGTH) 4-10-30 % CREA cream Apply topically 3 times daily as needed for Pain, Apply externally, 3 TIMES DAILY PRN Starting Tue 11/10/2020, Disp-113 g,R-0, Normal      Omega-3 Fatty Acids (FISH OIL PO) Take by mouth dailyHistorical Med      IBUPROFEN PO Take by mouth as neededHistorical Med      Multiple Vitamins-Minerals (IMMUNE SUPPORT PO) Take by mouth dailyHistorical Med      oxybutynin (DITROPAN-XL) 10 MG extended release tablet Take 1 tablet by mouth daily, Disp-90 tablet,R-3Normal      busPIRone (BUSPAR) 10 MG tablet Take 1 tablet by mouth every evening, Disp-90 tablet,R-3Normal      lidocaine (LMX) 4 % cream Apply 2-3 times a day as needed for pain, Disp-120 g,R-3, Normal      diclofenac sodium (VOLTAREN) 1 % GEL Apply 2 g topically 4 times daily as needed for Pain, Topical, 4 TIMES DAILY PRN Starting Tue 9/15/2020, Disp-1 Tube,R-3, Normal             ALLERGIES     is allergic to effexor [venlafaxine hcl], morphine, morphine sulfate, pcn [penicillins], and penicillin g. FAMILY HISTORY     She indicated that her mother is . She indicated that her father is . She indicated that both of her sisters are alive. She indicated that both of her brothers are alive. family history includes Alcohol Abuse in her father; Breast Cancer in her sister; Cancer in her mother; Cervical Cancer in her sister; Coronary Art Dis in her sister; Diabetes in her mother. SOCIAL HISTORY      reports that she has never smoked. She has never used smokeless tobacco. She reports current alcohol use. She reports that she does not use drugs. PHYSICAL EXAM     INITIAL VITALS:  height is 5' 3\" (1.6 m) and weight is 210 lb (95.3 kg). Her oral temperature is 98 °F (36.7 °C). Her blood pressure is 123/102 (abnormal) and her pulse is 74. Her respiration is 17 and oxygen saturation is 98%. Physical Exam  Vitals and nursing note reviewed. Constitutional:       General: She is not in acute distress. HENT:      Head: Normocephalic and atraumatic. Eyes:      Conjunctiva/sclera: Conjunctivae normal.      Pupils: Pupils are equal, round, and reactive to light. Cardiovascular:      Rate and Rhythm: Normal rate and regular rhythm. Heart sounds: Normal heart sounds. No murmur heard. Pulmonary:      Effort: Pulmonary effort is normal. No respiratory distress. Breath sounds: Normal breath sounds. Abdominal:      General: Bowel sounds are normal. There is no distension. Palpations: Abdomen is soft. Tenderness: There is no abdominal tenderness. Musculoskeletal:      Cervical back: Neck supple. Lumbar back: Tenderness and bony tenderness present. Back:    Lymphadenopathy:      Cervical: No cervical adenopathy. Skin:     General: Skin is warm and dry. Findings: No rash. Neurological:      General: No focal deficit present. Mental Status: She is alert and oriented to person, place, and time. Sensory: No sensory deficit. Motor: No weakness. Psychiatric:         Judgment: Judgment normal.           DIFFERENTIAL DIAGNOSIS/MDM:   24-year-old female presents with lower back pain for the past several weeks after mechanical fall. She is afebrile, nontoxic, normal vital signs. No acute distress. Also is having increased frequency of urination. No incontinence. No saddle anesthesia. No bowel habit symptoms. She does have midline tenderness in her back I am getting get a CT scan of her lumbar spine. We will get blood work, treat symptoms. Also get CT scan abdomen pelvis to make sure this is not something like a kidney stone causing her urinary symptoms. As far as cauda equina it is a possibility however she is not having any incontinence.   We will get a postvoid residual and if she is retaining a large amount of urine will consider an MRI.    DIAGNOSTIC RESULTS     EKG: All EKG's are interpreted by the Emergency Department Physician who either signs or Co-signs this chart in the absence of a cardiologist.        RADIOLOGY:   I directly visualized the following  images and reviewed the radiologist interpretations:  CT LUMBAR SPINE WO CONTRAST   Final Result   No evidence of an acute fracture or traumatic malalignment involving the   lumbar spine. CT ABDOMEN PELVIS WO CONTRAST Additional Contrast? None   Final Result   1. No intrarenal calculi or evidence of hydronephrosis   2. No acute findings within the abdomen or pelvis                 ED BEDSIDE ULTRASOUND:      LABS:  Labs Reviewed   CBC WITH AUTO DIFFERENTIAL - Abnormal; Notable for the following components:       Result Value    Monocytes 8 (*)     All other components within normal limits   BASIC METABOLIC PANEL - Abnormal; Notable for the following components:    Glucose 103 (*)     All other components within normal limits   URINE RT REFLEX TO CULTURE         EMERGENCY DEPARTMENT COURSE:   Vitals:    Vitals:    08/23/21 1731   BP: (!) 123/102   Pulse: 74   Resp: 17   Temp: 98 °F (36.7 °C)   TempSrc: Oral   SpO2: 98%   Weight: 210 lb (95.3 kg)   Height: 5' 3\" (1.6 m)     Imaging is unremarkable. CT scan of the lumbar spine, abdomen pelvis is unremarkable. Blood work unremarkable. Post void residual scan shows 0 cc. Very low suspicion for urinary retention. Very low suspicion for cauda equina syndrome. We will discharge home with pain medication, lidocaine patch, Pyridium for symptoms. Advised to follow-up with PCP, return if any symptoms worsen. I think pains secondary to musculoskeletal strain. Patient agreeable plan will be discharged home today. CRITICALCARE:      CONSULTS:  None      PROCEDURES:      FINAL IMPRESSION      1. Acute exacerbation of chronic low back pain    2.  Urinary frequency            DISPOSITION/PLAN   DISPOSITION Decision To Discharge 08/23/2021 09:21:05 PM          PATIENT REFERRED TO:  Jenny Negron MD  [address]  [address]  [address] [address]  [phone]    Schedule an appointment as soon as possible for a visit       [facility] ED  [address]  [address] [address]  [phone]    As needed, If symptoms worsen      DISCHARGE MEDICATIONS:  Discharge Medication List as of 8/23/2021  9:23 PM      START taking these medications    Details   HYDROcodone-acetaminophen (NORCO) 5-325 MG per tablet Take 1 tablet by mouth every 6 hours as needed for Pain for up to 3 days. , Disp-10 tablet, R-0Print      phenazopyridine (PYRIDIUM) 200 MG tablet Take 1 tablet by mouth 3 times daily as needed for Pain (bladder spasm/pain), Disp-6 tablet, R-0Print      lidocaine (LIDODERM) 5 % Place 1 patch onto the skin daily for 7 days 12 hours on, 12 hours off., Disp-7 patch, R-0Print             (Please note that portions ofthis note were completed with a voice recognition program.  Efforts were made to edit the dictations but occasionally words are mis-transcribed.)    Izabela Pearce DO  Attending Emergency Physician          Izabela Pearce DO  08/23/21 [id number]

## 2021-08-24 ENCOUNTER — TELEPHONE (OUTPATIENT)
Dept: FAMILY MEDICINE CLINIC | Age: 51
End: 2021-08-24

## 2021-08-24 NOTE — TELEPHONE ENCOUNTER
[facility]) ED Follow up Call     Reason for ED visit:   Acute exacerbation of chronic low back pain     Urinary frequency      8/24/2021     Kip Valdivia , this is debby from Dr. Inga Velez office, just calling to see how you are doing after your recent ED visit. Did you receive discharge instructions? Yes  Do you understand the discharge instructions? Yes  Did the ED give you any new prescriptions? Yes  Were you able to fill your prescriptions? Yes      Do you have one of our red, yellow and green  Zone sheets that help you to determine when you should go to the ED? No      Do you need or want to make a follow up appt with your PCP? Yes    Do you have any further needs in the home, e.g. equipment?   No        FU appts/Provider:    Future Appointments   Date Time Provider Derek Rosas   8/26/2021 11:45 AM Flavia Tran MD [id number]

## 2021-08-26 ENCOUNTER — VIRTUAL VISIT (OUTPATIENT)
Dept: FAMILY MEDICINE CLINIC | Age: 51
End: 2021-08-26
Payer: MEDICARE
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
## 2021-08-26 DIAGNOSIS — M51.36 DDD (DEGENERATIVE DISC DISEASE), LUMBAR: ICD-10-CM

## 2021-08-26 DIAGNOSIS — M54.50 ACUTE BILATERAL LOW BACK PAIN WITHOUT SCIATICA: Primary | ICD-10-CM

## 2021-08-26 PROCEDURE — 99214 OFFICE O/P EST MOD 30 MIN: CPT | Performed by: FAMILY MEDICINE

## 2021-08-26 RX ORDER — METHOCARBAMOL 500 MG/1
500 TABLET, FILM COATED ORAL 4 TIMES DAILY PRN
Qty: 40 TABLET | Refills: 2 | Status: SHIPPED | OUTPATIENT
Start: 2021-08-26 | End: 2022-06-21 | Stop reason: ALTCHOICE

## 2021-08-26 NOTE — PROGRESS NOTES
Skip contacted provider via telephone    Patient requested office visit, was scheduled for virtual visit phone call , Sanjuanita Tiwari was unable to use doxy. me or MyChart. Sanjuanita Tiwari is a 48 y.o. female evaluated via telephone on  21    Sanjuanita Tiwari (:  1970) is a 48 y.o. female,Established patient, here for evaluation of the following chief complaint(s): ED Follow-up      ASSESSMENT/PLAN:    1. Acute bilateral low back pain without sciatica  Failing to change as expected. I had a long discussion with the patient and I reviewed all the testing done in both emergency room's in detail with patient, there are no significant findings, no kidney stones, no hematoma, no internal injuries, and no fractures   I told her she has most likely musculoskeletal pain related to the fall and that it will take time to resolve  -start     methocarbamol (ROBAXIN) 500 MG tablet; Take 1 tablet by mouth 4 times daily as needed (back pain), Disp-40 tablet, R-2Normal  -  start   diclofenac sodium (VOLTAREN) 1 % GEL; Apply 2 g topically 2 times daily Every 6 hrs as needed for pain and swelling in the joint, Topical, 2 TIMES DAILY Starting Thu 2021, Disp-100 g, R-0, Normal  -     [facility]    2. Fall, initial encounter  -     methocarbamol (ROBAXIN) 500 MG tablet; Take 1 tablet by mouth 4 times daily as needed (back pain), Disp-40 tablet, R-2Normal  -     diclofenac sodium (VOLTAREN) 1 % GEL; Apply 2 g topically 2 times daily Every 6 hrs as needed for pain and swelling in the joint, Topical, 2 TIMES DAILY Starting Thu 2021, Disp-100 g, R-0, Normal  -     [facility]  3. DDD (degenerative disc disease), lumbar  Likely worsening due to wear and tear nature of the disease, but not severe. -     methocarbamol (ROBAXIN) 500 MG tablet;  Take 1 tablet by mouth 4 times daily as needed (back pain), Disp-40 tablet, R-2Normal  - diclofenac sodium (VOLTAREN) 1 % GEL; Apply 2 g topically 2 times daily Every 6 hrs as needed for pain and swelling in the joint, Topical, 2 TIMES DAILY Starting Thu 8/26/2021, Disp-100 g, R-0, Normal  -     [address]  She will call back for referral to pain management, depending on location  I did give her the contact information for physical therapy and to schedule appointment ASAP    Skip received counseling on the following healthy behaviors: nutrition, exercise and medication adherence  Reviewed prior labs and health maintenance  Discussed use, benefit, and side effects of prescribed medications. Barriers to medication compliance addressed. Patient given educational materials - see patient instructions  Was a self-tracking handout given in paper form or via [employer]? No  All patient questions answered. Patient voiced understanding. The patient's past medical,surgical, social, and family history as well as her current medications and allergies were reviewed as documented in today's encounter. Medications, labs, diagnostic studies, consultations and follow-up as documented in this encounter. Return in about 3 months (around 11/26/2021) for Face-2F-30mins PHYSICAL, VISION screen, PHQ9. .    Data Unavailable      Future Appointments   Date Time Provider Derek Rosas   1/21/2022 10:00 AM Kathie Dominique MD [facility]       Consent:  She is aware that that she may receive a bill for this telephone service, depending on her insurance coverage, and has provided verbal consent to proceed: Yes      Documentation and HPI:  I communicated with the patient about: ED Follow-up       Skip reports she Chani Bell 2 weeks ago on 8/11/2021 on wet  hard floor  Has been off work since then. She says she landed on the back and knees  Reports the Pain in the back is a bit better but she still has pain in the flanks, \"in the kidneys\"  Intensity of the pain 6-7 out of 10. Baclofen does not help. Seen on 8/19/2021 was given Lidoderm  patch, but does not help. She was given also pain medications with no improvement of the pain. Patient was asked to stand up and to do range of motion and to tell me if she has pain. She reports the pain is more on the right side with both twisting and bending on the sides  Bending forward and backwards makes her have sharp  pain over the spine in the midline  Then patient was asked to sit on a chair and do sitting straight leg test  Right Straight leg test positive when sitting  Left side straight leg test also positive but less severe per her report  Patient would also like to see pain management but wants to think about    Denies fever, chills, night sweats. Denies frequency, urgency or dysuria. Records from both emergency room visit reviewed with patient  Initially she was [facility] at [facility] on 8/19/2021  UA on 8/22/2021 trace blood otherwise normal, urine culture 10-50,000 organisms normal urogenital lily  Troponins normal less than 0.01, BNP and CBC on 8/19/2021    Reports from 8/23/2021 emergency room visit at [facility] also reviewed  UA within normal limits, no blood  Basic metabolic profile within normal limits except blood glucose 103 nonfasting mildly high, CBC within normal limits  She was treated with fentanyl 50 MCG. CT abdomen and pelvis within normal limits  CT lumbar spine showed degenerative disc disease worse at L5-S1 otherwise normal  Patient was discharged with a prescription for hydrocodone acetaminophen, Pyridium, lidocaine cream and patch      The patient's past medical, surgical, social, and family history as well as her current medications and allergies were reviewed as documented in today's encounter. Prior to Visit Medications    Medication Sig Taking?  Authorizing Provider   HYDROcodone-acetaminophen (NORCO) 5-325 MG per tablet Take 1 tablet by mouth every 6 hours as needed for Pain for up to 3 days. Yes Sapphire Oviedo,    phenazopyridine (PYRIDIUM) 200 MG tablet Take 1 tablet by mouth 3 times daily as needed for Pain (bladder spasm/pain) Yes Sapphire [place]o,    lidocaine (LIDODERM) 5 % Place 1 patch onto the skin daily for 7 days 12 hours on, 12 hours off. Yes Sapphire [place]o, DO   vitamin D (ERGOCALCIFEROL) 1.25 MG (06481 UT) CAPS capsule TAKE 1 CAPSULE BY MOUTH 1 TIME A WEEK Yes Radha Gonzalez MD   magnesium oxide (MAG-OX) 400 (240 Mg) MG tablet Take 1 tablet by mouth daily Yes Radha Gonzalez MD   vitamin E 400 UNIT capsule Take 1 capsule by mouth daily Yes Radha Gonzalez MD   baclofen (LIORESAL) 10 MG tablet Take 1 tablet by mouth 3 times daily as needed (MUSCLE SPASMS) Causes sedation, do not drive while taking this medication Yes Radha Gonzalez MD   polyethylene glycol (MIRALAX) 17 GM/SCOOP powder Use as Directed per instructions provided by physician office.  Yes Johnnie Cat MD   bisacodyl (DULCOLAX) 5 MG EC tablet Use as directed per instructions provided by physician office Yes Johnnie Cat MD   camphor-menthol-methyl salicylate (BENGAY ULTRA STRENGTH) 4-10-30 % CREA cream Apply topically 3 times daily as needed for Pain Yes Radha Gonzalez MD   Omega-3 Fatty Acids (FISH OIL PO) Take by mouth daily Yes Historical Provider, MD   IBUPROFEN PO Take by mouth as needed Yes Historical Provider, MD   Multiple Vitamins-Minerals (IMMUNE SUPPORT PO) Take by mouth daily Yes Historical Provider, MD   oxybutynin (DITROPAN-XL) 10 MG extended release tablet Take 1 tablet by mouth daily Yes Radha Gonzalez MD   busPIRone (BUSPAR) 10 MG tablet Take 1 tablet by mouth every evening Yes Radha Gonzalez MD   lidocaine (LMX) 4 % cream Apply 2-3 times a day as needed for pain Yes Radha Gonzalez MD   diclofenac sodium (VOLTAREN) 1 % GEL Apply 2 g topically 4 times daily as needed for Pain Yes Radha Gonzalez MD   gabapentin (NEURONTIN) 100 MG capsule Take 1 capsule by mouth nightly for 3 days, THEN 2 capsules every evening for 3 days, THEN 3 capsules every evening for 3 days. Maria M Ramos MD       -vital signs stable and within normal limits except severe obesity per BMI BMI 37.20 kg/M2  Patient-Reported Vitals 8/26/2021   Patient-Reported Weight 210 pounds   Patient-Reported Height 5 foot 3 inches   Patient-Reported Temperature -          Orders Placed This Encounter   Medications    methocarbamol (ROBAXIN) 500 MG tablet     Sig: Take 1 tablet by mouth 4 times daily as needed (back pain)     Dispense:  40 tablet     Refill:  2    diclofenac sodium (VOLTAREN) 1 % GEL     Sig: Apply 2 g topically 2 times daily Every 6 hrs as needed for pain and swelling in the joint     Dispense:  100 g     Refill:  0       Orders Placed This Encounter   Procedures    [facility] [address] [facility]     Referral Priority:   Routine     Referral Type:   Eval and Treat     Referral Reason:   Specialty Services Required     Referred to Provider:   Hilaria Hubbard PT     Requested Specialty:   Physical Therapy     Number of Visits Requested:   1       Medications Discontinued During This Encounter   Medication Reason    phenazopyridine (PYRIDIUM) 200 MG tablet Therapy completed    baclofen (LIORESAL) 10 MG tablet Alternate therapy    camphor-menthol-methyl salicylate (BENGAY ULTRA STRENGTH) 4-10-30 % CREA cream Cost of medication    gabapentin (NEURONTIN) 100 MG capsule Therapy completed       I affirm this is a Patient Initiated Episode with an Established Patient who has not had a related appointment within my department in the past 7 days or scheduled within the next 24 hours. Patient location's was at home, and provider's location was at the primary practice location.       Patient identification was verified at the start of the visit: Yes    On this date 8/26/2021 I have spent 30 minutes reviewing previous notes, test results and face to face with the patient discussing the diagnosis and importance of compliance with the treatment plan as well as documenting on the day of the visit. The patient (or guardian if applicable) is aware that this is a billable service. Verbal consent to proceed has been obtained within the past 12 months. The visit was conducted pursuant to the emergency declaration under the [address] [place] [address], [address] [place] [address] authority and the [employer] General Act. The patient was located in a state where the provider was credentialed to provide care. An electronic signature was used to authenticate this note.   Electronically signed by Al Ngo MD on 8/26/2021  at 10:37 PM

## 2021-09-12 DIAGNOSIS — E55.9 VITAMIN D DEFICIENCY: ICD-10-CM

## 2021-09-13 RX ORDER — ERGOCALCIFEROL 1.25 MG/1
CAPSULE ORAL
Qty: 12 CAPSULE | Refills: 0 | Status: SHIPPED | OUTPATIENT
Start: 2021-09-13 | End: 2021-12-14

## 2021-10-04 ENCOUNTER — TELEPHONE (OUTPATIENT)
Dept: FAMILY MEDICINE CLINIC | Age: 51
End: 2021-10-04

## 2021-10-04 NOTE — TELEPHONE ENCOUNTER
----- Message from Georgie Man sent at 10/2/2021  1:44 PM EDT -----  Subject: Appointment Request    Reason for Call: Routine Existing Condition Follow Up    QUESTIONS  Type of Appointment? Established Patient  Reason for appointment request? Available appointments did not meet   patient need  Additional Information for Provider? Patient wants to be see Dr. Hansa Neely   in person or by phone call about talking about getting back on Zoloft. She   wants to see her sooner than 10/14. Her anxiety is getting out of control   and wants to do something about it now.  ---------------------------------------------------------------------------  --------------  [address]  What is the best way for the office to contact you? OK to leave message on   voicemail  Preferred Call Back Phone Number? [phone]  ---------------------------------------------------------------------------  --------------  SCRIPT ANSWERS  Relationship to Patient? Self  (Is the patient requesting to be seen urgently for their symptoms?)? No  Is this follow up request related to routine Diabetes Management? No  Are you having any new concerns about your existing condition? No  Have you been diagnosed with, awaiting test results for, or told that you   are suspected of having COVID-19 (Coronavirus)? (If patient has tested   negative or was tested as a requirement for work, school, or travel and   not based on symptoms, answer no)? No  Within the past two weeks have you developed any of the following symptoms   (answer no if symptoms have been present longer than 2 weeks or began   more than 2 weeks ago)? Fever or Chills, Cough, Shortness of breath or   difficulty breathing, Loss of taste or smell, Sore throat, Nasal   congestion, Sneezing or runny nose, Fatigue or generalized body aches   (answer no if pain is specific to a body part e.g. back pain), Diarrhea,   Headache? No  Have you had close contact with someone with COVID-19 in the last 14 days? No  (Service Expert  click yes below to proceed with [employer] As Usual   Scheduling)?  Yes

## 2021-10-11 ENCOUNTER — OFFICE VISIT (OUTPATIENT)
Dept: FAMILY MEDICINE CLINIC | Age: 51
End: 2021-10-11
Payer: MEDICARE

## 2021-10-11 VITALS
BODY MASS INDEX: 38.34 KG/M2 | OXYGEN SATURATION: 98 % | DIASTOLIC BLOOD PRESSURE: 88 MMHG | HEART RATE: 78 BPM | TEMPERATURE: 97.9 F | HEIGHT: 63 IN | SYSTOLIC BLOOD PRESSURE: 126 MMHG | WEIGHT: 216.4 LBS

## 2021-10-11 DIAGNOSIS — M54.50 CHRONIC MIDLINE LOW BACK PAIN WITHOUT SCIATICA: ICD-10-CM

## 2021-10-11 DIAGNOSIS — N32.81 OAB (OVERACTIVE BLADDER): ICD-10-CM

## 2021-10-11 DIAGNOSIS — Z11.59 SCREENING FOR VIRAL DISEASE: ICD-10-CM

## 2021-10-11 DIAGNOSIS — Z23 NEED FOR INFLUENZA VACCINATION: ICD-10-CM

## 2021-10-11 DIAGNOSIS — F43.22 ADJUSTMENT DISORDER WITH ANXIOUS MOOD: Primary | ICD-10-CM

## 2021-10-11 DIAGNOSIS — R00.2 PALPITATIONS: ICD-10-CM

## 2021-10-11 DIAGNOSIS — G89.29 CHRONIC MIDLINE LOW BACK PAIN WITHOUT SCIATICA: ICD-10-CM

## 2021-10-11 PROCEDURE — 99214 OFFICE O/P EST MOD 30 MIN: CPT | Performed by: FAMILY MEDICINE

## 2021-10-11 PROCEDURE — G8417 CALC BMI ABV UP PARAM F/U: HCPCS | Performed by: FAMILY MEDICINE

## 2021-10-11 PROCEDURE — G8427 DOCREV CUR MEDS BY ELIG CLIN: HCPCS | Performed by: FAMILY MEDICINE

## 2021-10-11 PROCEDURE — 90674 CCIIV4 VAC NO PRSV 0.5 ML IM: CPT | Performed by: FAMILY MEDICINE

## 2021-10-11 PROCEDURE — 3017F COLORECTAL CA SCREEN DOC REV: CPT | Performed by: FAMILY MEDICINE

## 2021-10-11 PROCEDURE — G8482 FLU IMMUNIZE ORDER/ADMIN: HCPCS | Performed by: FAMILY MEDICINE

## 2021-10-11 PROCEDURE — 1036F TOBACCO NON-USER: CPT | Performed by: FAMILY MEDICINE

## 2021-10-11 PROCEDURE — 90471 IMMUNIZATION ADMIN: CPT | Performed by: FAMILY MEDICINE

## 2021-10-11 RX ORDER — SERTRALINE HYDROCHLORIDE 25 MG/1
25 TABLET, FILM COATED ORAL DAILY
Qty: 30 TABLET | Refills: 5 | Status: SHIPPED | OUTPATIENT
Start: 2021-10-11 | End: 2021-11-12 | Stop reason: SDUPTHER

## 2021-10-11 SDOH — ECONOMIC STABILITY: FOOD INSECURITY: WITHIN THE PAST 12 MONTHS, THE FOOD YOU BOUGHT JUST DIDN'T LAST AND YOU DIDN'T HAVE MONEY TO GET MORE.: NEVER TRUE

## 2021-10-11 SDOH — ECONOMIC STABILITY: FOOD INSECURITY: WITHIN THE PAST 12 MONTHS, YOU WORRIED THAT YOUR FOOD WOULD RUN OUT BEFORE YOU GOT MONEY TO BUY MORE.: NEVER TRUE

## 2021-10-11 ASSESSMENT — PATIENT HEALTH QUESTIONNAIRE - PHQ9
SUM OF ALL RESPONSES TO PHQ QUESTIONS 1-9: 0
SUM OF ALL RESPONSES TO PHQ9 QUESTIONS 1 & 2: 0
1. LITTLE INTEREST OR PLEASURE IN DOING THINGS: 0
2. FEELING DOWN, DEPRESSED OR HOPELESS: 0
9. THOUGHTS THAT YOU WOULD BE BETTER OFF DEAD, OR OF HURTING YOURSELF: 0
SUM OF ALL RESPONSES TO PHQ QUESTIONS 1-9: 0
SUM OF ALL RESPONSES TO PHQ QUESTIONS 1-9: 0

## 2021-10-11 ASSESSMENT — ENCOUNTER SYMPTOMS
BACK PAIN: 1
SHORTNESS OF BREATH: 0
ABDOMINAL PAIN: 0

## 2021-10-11 ASSESSMENT — SOCIAL DETERMINANTS OF HEALTH (SDOH): HOW HARD IS IT FOR YOU TO PAY FOR THE VERY BASICS LIKE FOOD, HOUSING, MEDICAL CARE, AND HEATING?: NOT VERY HARD

## 2021-10-11 NOTE — PATIENT INSTRUCTIONS
Patient Education        sertraline  Pronunciation:  SER tra simi  Brand:  Zoloft  What is the most important information I should know about sertraline? Some young people have thoughts about suicide when first taking an antidepressant. Stay alert to changes in your mood or symptoms. Report any new or worsening symptoms to your doctor. What is sertraline? Sertraline is a selective serotonin reuptake inhibitor (SSRI). Sertraline is used to treat major depressive disorder, obsessive-compulsive disorder (OCD), panic disorder, social anxiety disorder (SAD), and post-traumatic stress disorder (PTSD). Sertraline is also used to treat premenstrual dysphoric disorder. Sertraline may also be used for purposes not listed in this medication guide. What should I discuss with my healthcare provider before taking sertraline? You should not use sertraline if you are allergic to it, or if you also take pimozide. Do not use the liquid form of sertraline  if you take disulfiram (Antabuse). Do not use sertraline within 14 days before or 14 days after using an MAO inhibitor. A dangerous drug interaction could occur. MAO inhibitors include isocarboxazid, linezolid, methylene blue injection, phenelzine, tranylcypromine, and others. Tell your doctor if you also take stimulant medicine, opioid medicine, herbal products, or medicine for depression, mental illness, Parkinson's disease, migraine headaches, serious infections, or prevention of nausea and vomiting. An interaction with sertraline could cause a serious condition called serotonin syndrome. Tell your doctor if you have ever had:  · bipolar disorder (manic depression);  · heart disease, high blood pressure, or a stroke;  · liver or kidney disease;  · seizures;  · glaucoma;  · bleeding problems, or if you take warfarin (Coumadin, Jantoven);  · long QT syndrome; or  · low levels of sodium in your blood.   Some young people have thoughts about suicide when first taking an antidepressant. Your doctor should check your progress at regular visits. Your family or other caregivers should also be alert to changes in your mood or symptoms. Sertraline is approved for use in children at least 10years old, only to treat obsessive-compulsive disorder but not depression. Taking this medicine during pregnancy could harm the baby, but stopping the medicine may not be safe for you. Do not start or stop sertraline without asking your doctor. Ask a doctor if it is safe to breastfeed while using this medicine. How should I take sertraline? Follow all directions on your prescription label and read all medication guides or instruction sheets. Your doctor may occasionally change your dose. Use the medicine exactly as directed. Take sertraline with or without food, at the same time each day. Sertraline liquid (oral concentrate) must be diluted with a liquid right before you take it. Read and carefully follow all mixing instructions provided with your medicine. Ask your doctor or pharmacist if you need help. Measure the mixed medicine with the supplied syringe or a dose-measuring device (not a kitchen spoon). Sertraline may cause false results on a drug-screening urine test. Tell the laboratory staff that you use sertraline. Do not stop using sertraline suddenly, or you could have unpleasant symptoms (such as agitation, confusion, tingling or electric shock feelings). Ask your doctor before stopping the medicine. Store tightly closed at room temperature, away from moisture and heat. What happens if I miss a dose? Take the medicine as soon as you can, but skip the missed dose if it is almost time for your next dose. Do not take two doses at one time. What happens if I overdose? Seek emergency medical attention or call the Poison Help line at 1-658.163.9774. What should I avoid while taking sertraline? Drinking alcohol with this medicine can cause side effects.   Avoid driving or hazardous activity until you know how this medicine will affect you. Your reactions could be impaired. What are the possible side effects of sertraline? Get emergency medical help if you have signs of an allergic reaction: skin rash or hives (with or without fever or joint pain); difficulty breathing; swelling of your face, lips, tongue, or throat. Report any new or worsening symptoms to your doctor, such as: mood or behavior changes, anxiety, panic attacks, trouble sleeping, or if you feel impulsive, irritable, agitated, hostile, aggressive, restless, hyperactive (mentally or physically), more depressed, or have thoughts about suicide or hurting yourself. Call your doctor at once if you have:  · a seizure;  · vision changes, eye pain, redness, or swelling;  · low blood sodium --headache, confusion, problems with thinking or memory, weakness, feeling unsteady; or  · manic episodes --racing thoughts, increased energy, unusual risk-taking behavior, extreme happiness, being irritable or talkative. Seek medical attention right away if you have symptoms of serotonin syndrome, such as: agitation, hallucinations, fever, sweating, shivering, fast heart rate, muscle stiffness, twitching, loss of coordination, nausea, vomiting, or diarrhea. Sertraline can affect growth in children. Your child's height and weight may be checked often. Common side effects may include:  · indigestion, nausea, diarrhea, loss of appetite;  · sweating;  · tremors; or  · sexual problems. This is not a complete list of side effects and others may occur. Call your doctor for medical advice about side effects. You may report side effects to FDA at 3-570-FDA-3607. What other drugs will affect sertraline? Sertraline can cause a serious heart problem. Your risk may be higher if you also use certain other medicines for infections, asthma, heart problems, high blood pressure, depression, mental illness, cancer, malaria, or HIV.   Ask your doctor before taking a nonsteroidal anti-inflammatory drug (NSAID) such as aspirin, ibuprofen, naproxen, Advil, Aleve, Motrin, and others. Using an NSAID with sertraline may cause you to bruise or bleed easily. Other drugs may affect sertraline, including prescription and over-the-counter medicines, vitamins, and herbal products. Tell your doctor about all other medicines you use. Where can I get more information? Your pharmacist can provide more information about sertraline. Remember, keep this and all other medicines out of the reach of children, never share your medicines with others, and use this medication only for the indication prescribed. Every effort has been made to ensure that the information provided by [address] [place] [address]  is accurate, up-to-date, and complete, but no guarantee is made to that effect. Drug information contained herein may be time sensitive. [facility] information has been compiled for use by healthcare practitioners and consumers in the [place] and therefore [facility] does not warrant that uses outside of the [place] are appropriate, unless specifically indicated otherwise. [facility] drug information does not endorse drugs, diagnose patients or recommend therapy. [facility] drug information is an informational resource designed to assist licensed healthcare practitioners in caring for their patients and/or to serve consumers viewing this service as a supplement to, and not a substitute for, the expertise, skill, knowledge and judgment of healthcare practitioners. The absence of a warning for a given drug or drug combination in no way should be construed to indicate that the drug or drug combination is safe, effective or appropriate for any given patient. [facility] does not assume any responsibility for any aspect of healthcare administered with the aid of information [facility] provides.  The information contained herein is not intended to cover all possible uses, directions, precautions, warnings, drug interactions, allergic reactions, or adverse effects. If you have questions about the drugs you are taking, check with your doctor, nurse or pharmacist.  Copyright 1122-6593 [address]: 22.01. Revision date: 5/26/2021. Care instructions adapted under license by [facility] ([facility]). If you have questions about a medical condition or this instruction, always ask your healthcare professional. [place] [address] any warranty or liability for your use of this information.

## 2021-10-11 NOTE — PROGRESS NOTES
Visit Information    Have you changed or started any medications since your last visit including any over-the-counter medicines, vitamins, or herbal medicines? no   Are you having any side effects from any of your medications? -  no  Have you stopped taking any of your medications? Is so, why? -  no    Have you seen any other physician or provider since your last visit? No  Have you had any other diagnostic tests since your last visit? No  Have you been seen in the emergency room and/or had an admission to a hospital since we last saw you? No  Have you had your routine dental cleaning in the past 6 months? no    Have you activated your mascotsecret account? If not, what are your barriers?  Yes     Patient Care Team:  Wilber Berry MD as PCP - General (Family Medicine)  Wilber Berry MD as PCP - [facility]  Jack Denney MD as Consulting Physician (Cardiology)  Cuate Berry MD as Consulting Physician (Neurology)  David Chinchilla MD as Consulting Physician (Urology)    Medical History Review  Past Medical, Family, and Social History reviewed and does contribute to the patient presenting condition    Health Maintenance   Topic Date Due    Hepatitis C screen  Never done    COVID-19 Vaccine (1) Never done    Cervical cancer screen  Never done    Colon cancer screen colonoscopy  Never done    Shingles Vaccine (1 of 2) Never done    Flu vaccine (1) 09/01/2021    Breast cancer screen  10/03/2021    Diabetes screen  04/08/2022    Lipid screen  02/13/2026    DTaP/Tdap/Td vaccine (3 - Td or Tdap) 01/09/2028    HIV screen  Completed    Hepatitis A vaccine  Aged Out    Hepatitis B vaccine  Aged Out    Hib vaccine  Aged Out    Meningococcal (ACWY) vaccine  Aged Out    Pneumococcal 0-64 years Vaccine  Aged Out

## 2021-10-11 NOTE — PROGRESS NOTES
[facility] PHYSICIANS  [facility]YS[facility]  [address]  [address] [address]  Dept: [address] (:  1970) is a 46 y.o. female. Patient is here for evaluation of the following chief complaint(s):  Chief Complaint   Patient presents with    Anxiety     medications not helping        SUBJECTIVE/OBJECTIVE:  SHYAM Riddle is a 46 y.o. female patient. Patient is an established patient of Dr. Bennett Novoa . Patient has a known history of anxiety, low back pain, palpitations, overactive bladder. PALPITATIONS  Patient reported some recurrent palpitation that lasts for a few minutes to 10 minutes at a time. Patient states that she normally has at least twice a year. However, lately she has been noticing it more frequent. Patient states that this is also associated with some lightheadedness at times especially when it is lasting longer than usual.  No previous evaluation done. LOW BACK PAIN  Patient is a known history of degenerative disc disease of the lumbar spine. Patient has had some intermittent mid low back pain for a while. Recently she was seen by the PCP and was given some ibuprofen, Robaxin, patient reports fair success with current therapy. She also was reporting that she had to sleep on an air mattress since they are moving. She noted that did better with air mattress. As far as the back pain and the fibromyalgia history that she has. DEPRESSION AND ANXIETY  Patient reported that in the past year and a half she has had some increasing anxiety. Patient states that she usually she can handle it with meditations and medication. However, she has been having some inability to resolve some of her current issues. Patient states that she has had some anxiety ever since she was young. She was recently started on some buspirone. She was taking the buspirone at night which was recommended to help with also sleeping.   However, she does not feel that this therapy is working well for her. Patient stated that she was on Zoloft in the past which worked very well for her. She is willing to try this again today. Symptoms includes insomnia, irritable, psychomotor agitation, racing thoughts and insomnia. Current therapy includes Buspirone, will start Zoloft. she also denies suicidal/homicidal ideation, plan or intent. .       B  TROY-7 SCREENING 9/15/2020 11/7/2018   Feeling nervous, anxious, or on edge 0-Not at all 3-Nearly every day   Not able to stop or control worrying 0-Not at all 3-Nearly every day   Worrying too much about different things 0-Not at all 3-Nearly every day   Trouble relaxing 0-Not at all 3-Nearly every day   Being so restless that it's hard to sit still 0-Not at all 1-Several days   Becoming easily annoyed or irritable 1-Several days 3-Nearly every day   Feeling afraid as if something awful might happen 0-Not at all 3-Nearly every day   TROY-7 Total Score 1 19       No data recorded  VITAL SIGNS:  Vitals:    10/11/21 1153   BP: 126/88   Pulse: 78   Temp: 97.9 °F (36.6 °C)   TempSrc: Temporal   SpO2: 98%   Weight: 216 lb 6.4 oz (98.2 kg)   Height: 5' 3\" (1.6 m)   Estimated body mass index is 38.33 kg/m² as calculated from the following:    Height as of this encounter: 5' 3\" (1.6 m). Weight as of this encounter: 216 lb 6.4 oz (98.2 kg). Review of Systems   Constitutional: Negative for chills and fever. Respiratory: Negative for shortness of breath. Cardiovascular: Positive for palpitations. Negative for chest pain. Gastrointestinal: Negative for abdominal pain. Genitourinary: Negative for dysuria. OAB   Musculoskeletal: Positive for arthralgias, back pain and myalgias. Neurological: Negative for weakness and headaches. Psychiatric/Behavioral: Positive for dysphoric mood and sleep disturbance. Negative for suicidal ideas. The patient is nervous/anxious.         Physical Exam  Vitals and nursing note reviewed. Constitutional:       Appearance: Normal appearance. She is obese. HENT:      Head: Normocephalic. Nose: Nose normal.   Cardiovascular:      Rate and Rhythm: Normal rate and regular rhythm. Pulmonary:      Effort: Pulmonary effort is normal.      Breath sounds: Normal breath sounds. Abdominal:      General: Abdomen is protuberant. Musculoskeletal:      Thoracic back: Decreased range of motion. Lumbar back: No tenderness. Decreased range of motion. Skin:     General: Skin is warm and dry. Neurological:      Mental Status: She is alert and oriented to person, place, and time. Psychiatric:         Mood and Affect: Mood is anxious. Speech: Speech is rapid and pressured. Thought Content: Thought content does not include suicidal ideation. MEDICAL HISTORY      Diagnosis Date    Anxiety     unison ,on meds in past     CAD (coronary artery disease) 5/5/2014    Chronic back pain     Depression     post partum and childhood    Fatty liver 3/10/2021    Fibromyalgia     Headache(784.0)     High cholesterol     Palpitations       MEDICATIONS  Prior to Visit Medications    Medication Sig Taking?  Authorizing Provider   sertraline (ZOLOFT) 25 MG tablet Take 1 tablet by mouth daily Yes ИВАН Van - CNP   vitamin D (ERGOCALCIFEROL) 1.25 MG (19880 UT) CAPS capsule TAKE 1 CAPSULE BY MOUTH 1 TIME A WEEK Yes Snehal Kelly MD   methocarbamol (ROBAXIN) 500 MG tablet Take 1 tablet by mouth 4 times daily as needed (back pain) Yes Snehal Kelly MD   diclofenac sodium (VOLTAREN) 1 % GEL Apply 2 g topically 2 times daily Every 6 hrs as needed for pain and swelling in the joint Yes Kenyatta Geller MD   magnesium oxide (MAG-OX) 400 (240 Mg) MG tablet Take 1 tablet by mouth daily Yes Kenyatta Geller MD   vitamin E 400 UNIT capsule Take 1 capsule by mouth daily Yes Kenyatta Geller MD   IBUPROFEN PO Take by mouth as needed Yes Historical Provider, MD   Multiple Vitamins-Minerals (IMMUNE SUPPORT PO) Take by mouth daily Yes Historical Provider, MD   lidocaine (LMX) 4 % cream Apply 2-3 times a day as needed for pain Yes Yolis Osullivan MD   Omega-3 Fatty Acids (FISH OIL PO) Take by mouth daily  Patient not taking: Reported on 10/11/2021  Historical Provider, MD   busPIRone (BUSPAR) 10 MG tablet Take 1 tablet by mouth every evening  Patient not taking: Reported on 10/11/2021  Yolis Osullivan MD     Controlled Substance Monitoring:  Acute and Chronic Pain Monitoring:   RX Monitoring 3/12/2021   Attestation -   Periodic Controlled Substance Monitoring Possible medication side effects, risk of tolerance/dependence & alternative treatments discussed. ;No signs of potential drug abuse or diversion identified. ;Assessed functional status. ASSESSMENT/PLAN:  1. Adjustment disorder with anxious mood  Worsening  Continue current therapy. Discussed how to recognize anxiety. Advised to relieve tension with exercise or a massage. Advised to get enough rest.  Advised to avoid alcohol, caffeine, nicotine, and illegal drugs. Which can increase anxiety level and cause sleep problems. - sertraline (ZOLOFT) 25 MG tablet; Take 1 tablet by mouth daily  Dispense: 30 tablet; Refill: 5    2. Chronic midline low back pain without sciatica  Improving  Continue current therapy. DISCUSSED AND ADVISED TO:  Use heat packs 15 to 20 mins every 2-3 hours. Do some back stretches as tolerated. Refer to hand out for instructions. Call for worsening, numbness, weakness. 3. Palpitations  Failure to Improve  Continue to evaluate  Holter monitor ordered  - Holter Monitor 24 Hour; Future    4. OAB (overactive bladder)  Stable  Stopped Ditropan on her own  Continue to monitor        5. Need for influenza vaccination  Recommended by CDC. No current infection.   Denies previous adverse reaction to vaccination.      - INFLUENZA, MDCK QUADV, 2 YRS AND OLDER, IM, PF, PREFILL SYR OR SDV, 0.5ML (FLUCELVAX QUADV, PF)    6. Screening for viral disease  Recommended    - Hepatitis C Antibody; Future       Return in about 4 weeks (around 11/8/2021) for dep/anx started on Zoloft. This note was completed by using the assistance of a speech-recognition program. However, inadvertent computerized transcription errors may be present. Although every effort was made to ensure accuracy, no guarantees can be provided that every mistake has been identified and corrected by editing.   Electronically signed by ИВАН Forbes CNP on 10/10/21 at 8:28 PM EDT     --ИВАН Forbes - BETO

## 2021-11-12 DIAGNOSIS — F43.22 ADJUSTMENT DISORDER WITH ANXIOUS MOOD: ICD-10-CM

## 2021-11-12 DIAGNOSIS — K76.0 FATTY LIVER: ICD-10-CM

## 2021-11-12 RX ORDER — SERTRALINE HYDROCHLORIDE 25 MG/1
25 TABLET, FILM COATED ORAL DAILY
Qty: 30 TABLET | Refills: 1 | Status: SHIPPED | OUTPATIENT
Start: 2021-11-12 | End: 2021-12-14 | Stop reason: SDUPTHER

## 2021-11-12 NOTE — TELEPHONE ENCOUNTER
Please Approve or Refuse. Send to Pharmacy per Pt's Request:        Patient states she was to let Melissa know if this initial dose was not working and if she would like to have it increased [address] would.       Next Visit Date:  11/16/2021   Last Visit Date: 10/11/2021    Hemoglobin A1C (%)   Date Value   04/08/2019 5.6             ( goal A1C is < 7)   BP Readings from Last 3 Encounters:   10/11/21 126/88   08/23/21 (!) 123/102   03/12/21 110/64          (goal 120/80)  BUN   Date Value Ref Range Status   08/23/2021 13 6 - 20 mg/dL Final     CREATININE   Date Value Ref Range Status   08/23/2021 0.87 0.50 - 0.90 mg/dL Final     Potassium   Date Value Ref Range Status   08/23/2021 4.8 3.7 - 5.3 mmol/L Final

## 2021-11-15 RX ORDER — CALCIUM CARBONATE/VITAMIN D3 600 MG-10
TABLET ORAL
Qty: 30 CAPSULE | Refills: 3 | Status: SHIPPED | OUTPATIENT
Start: 2021-11-15 | End: 2022-06-07

## 2021-11-15 NOTE — TELEPHONE ENCOUNTER
Please Approve or Refuse.   Send to Pharmacy per Pt's Request:      Next Visit Date:  12/14/2021   Last Visit Date: 10/11/2021    Hemoglobin A1C (%)   Date Value   04/08/2019 5.6             ( goal A1C is < 7)   BP Readings from Last 3 Encounters:   10/11/21 126/88   08/23/21 (!) 123/102   03/12/21 110/64          (goal 120/80)  BUN   Date Value Ref Range Status   08/23/2021 13 6 - 20 mg/dL Final     CREATININE   Date Value Ref Range Status   08/23/2021 0.87 0.50 - 0.90 mg/dL Final     Potassium   Date Value Ref Range Status   08/23/2021 4.8 3.7 - 5.3 mmol/L Final

## 2021-12-12 RX ORDER — ZOSTER VACCINE RECOMBINANT, ADJUVANTED 50 MCG/0.5
0.5 KIT INTRAMUSCULAR ONCE
Qty: 0.5 ML | Refills: 0 | Status: CANCELLED | OUTPATIENT
Start: 2021-12-12 | End: 2021-12-12

## 2021-12-12 ASSESSMENT — ENCOUNTER SYMPTOMS
SHORTNESS OF BREATH: 0
ABDOMINAL PAIN: 0

## 2021-12-13 NOTE — PROGRESS NOTES
been bothered by any of the following problems? PHQ-9 Total Score: 0 B  TROY-7 SCREENING 12/14/2021 9/15/2020 11/7/2018   Feeling nervous, anxious, or on edge Not at all - -   Not being able to stop or control worrying Not at all - -   Worrying too much about different things More than half the days - -   Trouble relaxing Not at all - -   Being so restless that it is hard to sit still Not at all - -   Becoming easily annoyed or irritable Not at all - -   Feeling afraid as if something awful might happen Not at all - -   TROY-7 Total Score 2 - -   Feeling nervous, anxious, or on edge - 0-Not at all 3-Nearly every day   Not able to stop or control worrying - 0-Not at all 3-Nearly every day   Worrying too much about different things - 0-Not at all 3-Nearly every day   Trouble relaxing - 0-Not at all 3-Nearly every day   Being so restless that it's hard to sit still - 0-Not at all 1-Several days   Becoming easily annoyed or irritable - 1-Several days 3-Nearly every day   Feeling afraid as if something awful might happen - 0-Not at all 3-Nearly every day   TROY-7 Total Score - 1 19       VITAMIN D  Patient has a known Vitamin D Deficiency. she had a course of supplementation for 12 weeks. She will get switched to daily doseWe continue to discuss ways to manage this condition. We also discussed ways to improve the levels. Such as learning food groups that are high in Vitamin D. We also discuss that sun exposure is beneficial however, too much sun and sun damage can potentially result in skin cancer. Lab Results   Component Value Date/Time    VITD25 21.5 (L) 02/13/2021 12:48 PM        She is due for Mammogram.   Denies breast pain, lumps or nipple discharge. She declines breast exam today.   PHQ-9 Total Score: 0 (12/14/2021 12:08 PM)    VITAL SIGNS:  Vitals:    12/14/21 1206   BP: 120/84   Pulse: 81   Temp: 98.1 °F (36.7 °C)   TempSrc: Temporal   SpO2: 97%   Weight: 226 lb 3.2 oz (102.6 kg)   Height: 5' 3\" (1.6 m) Estimated body mass index is 40.07 kg/m² as calculated from the following:    Height as of this encounter: 5' 3\" (1.6 m). Weight as of this encounter: 226 lb 3.2 oz (102.6 kg). Review of Systems   Constitutional: Negative for chills and fever. Eyes: Positive for discharge and itching. Respiratory: Negative for shortness of breath. Cardiovascular: Positive for palpitations. Negative for chest pain. Gastrointestinal: Negative for abdominal pain. Genitourinary: Negative for dysuria. OAB   Musculoskeletal: Positive for arthralgias. Skin: Positive for rash. Neurological: Negative for weakness and headaches. Psychiatric/Behavioral: Negative for dysphoric mood, sleep disturbance and suicidal ideas. The patient is nervous/anxious. Physical Exam  Vitals and nursing note reviewed. Constitutional:       Appearance: Normal appearance. She is obese. HENT:      Head: Normocephalic. Nose: Nose normal.   Eyes:      General:         Right eye: Discharge present. Left eye: Discharge present. Conjunctiva/sclera:      Right eye: Right conjunctiva is injected. No exudate. Left eye: Left conjunctiva is injected (mildmild). No exudate. Cardiovascular:      Rate and Rhythm: Normal rate and regular rhythm. Pulmonary:      Effort: Pulmonary effort is normal.      Breath sounds: Normal breath sounds. Abdominal:      General: Abdomen is protuberant. Musculoskeletal:      Thoracic back: Decreased range of motion. Lumbar back: No tenderness. Decreased range of motion. Skin:     General: Skin is warm and dry. Neurological:      Mental Status: She is alert and oriented to person, place, and time. Psychiatric:         Mood and Affect: Mood is anxious. Speech: Speech is rapid and pressured. Thought Content: Thought content does not include suicidal ideation.          MEDICAL HISTORY      Diagnosis Date    Anxiety     unison ,on meds in past     CAD (coronary artery disease) 5/5/2014    Chronic back pain     Depression     post partum and childhood    Fatty liver 3/10/2021    Fibromyalgia     Headache(784.0)     High cholesterol     Palpitations       MEDICATIONS  Prior to Visit Medications    Medication Sig Taking? Authorizing Provider   Melatonin 1 MG CHEW Take by mouth Yes Historical Provider, MD   sertraline (ZOLOFT) 50 MG tablet Take 1 tablet by mouth daily Yes ИВАН Van CNP   vitamin D (CHOLECALCIFEROL) 25 MCG (1000 UT) TABS tablet Take 1 tablet by mouth daily Yes ИВАН Van CNP   dexamethasone (DECADRON) 0.1 % ophthalmic suspension Place 1 drop into both eyes 2 times daily Yes ИВАН Van CNP   VITAMIN E/D-ALPHA NATURAL 268 MG (400 UNIT) CAPS TAKE 1 CAPSULE BY MOUTH DAILY Yes Aiyana Krishnan MD   methocarbamol (ROBAXIN) 500 MG tablet Take 1 tablet by mouth 4 times daily as needed (back pain) Yes Snehal Kelly MD   diclofenac sodium (VOLTAREN) 1 % GEL Apply 2 g topically 2 times daily Every 6 hrs as needed for pain and swelling in the joint Yes Aiyana Krishnan MD   magnesium oxide (MAG-OX) 400 (240 Mg) MG tablet Take 1 tablet by mouth daily Yes Aiyana Krishnan MD   Omega-3 Fatty Acids (FISH OIL PO) Take by mouth daily  Yes Historical Provider, MD   IBUPROFEN PO Take by mouth as needed Yes Historical Provider, MD   Multiple Vitamins-Minerals (IMMUNE SUPPORT PO) Take by mouth daily Yes Historical Provider, MD   lidocaine (LMX) 4 % cream Apply 2-3 times a day as needed for pain Yes Aiyana Krishnan MD     Controlled Substance Monitoring:  Acute and Chronic Pain Monitoring:   RX Monitoring 3/12/2021   Attestation -   Periodic Controlled Substance Monitoring Possible medication side effects, risk of tolerance/dependence & alternative treatments discussed. ;No signs of potential drug abuse or diversion identified. ;Assessed functional status. ASSESSMENT/PLAN:  1.  Acute allergic conjunctivitis of both eyes  Failure to Improve  start dexamethasone  Call for worsening    - dexamethasone (DECADRON) 0.1 % ophthalmic suspension; Place 1 drop into both eyes 2 times daily  Dispense: 1 each; Refill: 1    2. Adjustment disorder with anxious mood  Improving  Continue current therapy. Discussed how to recognize anxiety. Advised to relieve tension with exercise or a massage. Advised to get enough rest.  Advised to avoid alcohol, caffeine, nicotine, and illegal drugs. Which can increase anxiety level and cause sleep problems. - sertraline (ZOLOFT) 50 MG tablet; Take 1 tablet by mouth daily  Dispense: 90 tablet; Refill: 1    3. Vitamin D deficiency  Continue Vitamin D supplementation  DISCUSSED AND ADVISED TO:  Foods that contain a lot of vitamin D includes Richards, tuna, and mackerel. Cheese, egg yolks, and beef liver have small amounts of vit D.  Milk, soy drinks, orange juice, yogurt, margarine, and some kinds of cereal have vitamin D added to them. Continue to use sunblock when out in the sun to prevent skin cancer.    - vitamin D (CHOLECALCIFEROL) 25 MCG (1000 UT) TABS tablet; Take 1 tablet by mouth daily  Dispense: 90 tablet; Refill: 5    4. Breast cancer screening by mammogram  Recommended    - [facility] CELESTE DIGITAL SCREEN BILATERAL; Future           Return in about 4 months (around 4/14/2022) for Chronic conditions, 30mins. This note was completed by using the assistance of a speech-recognition program. However, inadvertent computerized transcription errors may be present. Although every effort was made to ensure accuracy, no guarantees can be provided that every mistake has been identified and corrected by editing.   Electronically signed by ИВАН Azul CNP on 10/10/21 at 8:28 PM EDT     --ИВАН Azul CNP

## 2021-12-14 ENCOUNTER — OFFICE VISIT (OUTPATIENT)
Dept: FAMILY MEDICINE CLINIC | Age: 51
End: 2021-12-14
Payer: MEDICARE

## 2021-12-14 VITALS
SYSTOLIC BLOOD PRESSURE: 120 MMHG | HEART RATE: 81 BPM | TEMPERATURE: 98.1 F | WEIGHT: 226.2 LBS | HEIGHT: 63 IN | OXYGEN SATURATION: 97 % | BODY MASS INDEX: 40.08 KG/M2 | DIASTOLIC BLOOD PRESSURE: 84 MMHG

## 2021-12-14 DIAGNOSIS — F43.22 ADJUSTMENT DISORDER WITH ANXIOUS MOOD: ICD-10-CM

## 2021-12-14 DIAGNOSIS — M79.7 FIBROMYALGIA: ICD-10-CM

## 2021-12-14 DIAGNOSIS — Z12.31 BREAST CANCER SCREENING BY MAMMOGRAM: ICD-10-CM

## 2021-12-14 DIAGNOSIS — H10.13 ACUTE ALLERGIC CONJUNCTIVITIS OF BOTH EYES: Primary | ICD-10-CM

## 2021-12-14 DIAGNOSIS — E55.9 VITAMIN D DEFICIENCY: ICD-10-CM

## 2021-12-14 PROCEDURE — 99213 OFFICE O/P EST LOW 20 MIN: CPT | Performed by: FAMILY MEDICINE

## 2021-12-14 PROCEDURE — 1036F TOBACCO NON-USER: CPT | Performed by: FAMILY MEDICINE

## 2021-12-14 PROCEDURE — G8427 DOCREV CUR MEDS BY ELIG CLIN: HCPCS | Performed by: FAMILY MEDICINE

## 2021-12-14 PROCEDURE — 3017F COLORECTAL CA SCREEN DOC REV: CPT | Performed by: FAMILY MEDICINE

## 2021-12-14 PROCEDURE — G8482 FLU IMMUNIZE ORDER/ADMIN: HCPCS | Performed by: FAMILY MEDICINE

## 2021-12-14 PROCEDURE — G8417 CALC BMI ABV UP PARAM F/U: HCPCS | Performed by: FAMILY MEDICINE

## 2021-12-14 ASSESSMENT — PATIENT HEALTH QUESTIONNAIRE - PHQ9
2. FEELING DOWN, DEPRESSED OR HOPELESS: 0
SUM OF ALL RESPONSES TO PHQ QUESTIONS 1-9: 0
SUM OF ALL RESPONSES TO PHQ9 QUESTIONS 1 & 2: 0
SUM OF ALL RESPONSES TO PHQ QUESTIONS 1-9: 0
1. LITTLE INTEREST OR PLEASURE IN DOING THINGS: 0
SUM OF ALL RESPONSES TO PHQ QUESTIONS 1-9: 0

## 2021-12-14 ASSESSMENT — ANXIETY QUESTIONNAIRES
2. NOT BEING ABLE TO STOP OR CONTROL WORRYING: 0
4. TROUBLE RELAXING: 0
1. FEELING NERVOUS, ANXIOUS, OR ON EDGE: 0
6. BECOMING EASILY ANNOYED OR IRRITABLE: 0
7. FEELING AFRAID AS IF SOMETHING AWFUL MIGHT HAPPEN: 0
3. WORRYING TOO MUCH ABOUT DIFFERENT THINGS: 2
IF YOU CHECKED OFF ANY PROBLEMS ON THIS QUESTIONNAIRE, HOW DIFFICULT HAVE THESE PROBLEMS MADE IT FOR YOU TO DO YOUR WORK, TAKE CARE OF THINGS AT HOME, OR GET ALONG WITH OTHER PEOPLE: NOT DIFFICULT AT ALL
GAD7 TOTAL SCORE: 2
5. BEING SO RESTLESS THAT IT IS HARD TO SIT STILL: 0

## 2021-12-14 ASSESSMENT — ENCOUNTER SYMPTOMS
EYE DISCHARGE: 1
EYE ITCHING: 1

## 2021-12-14 NOTE — PATIENT INSTRUCTIONS
Patient Education        Depression and Chronic Disease: Care Instructions  Your Care Instructions     A chronic disease is one that you have for a long time. Some chronic diseases can be controlled, but they usually cannot be cured. Depression is common in people with chronic diseases, but it often goes unnoticed. Many people have concerns about seeking treatment for a mental health problem. You may think it's a sign of weakness, or you don't want people to know about it. It's important to overcome these reasons for not seeking treatment. Treating depression or anxiety is good for your health. Follow-up care is a key part of your treatment and safety. Be sure to make and go to all appointments, and call your doctor if you are having problems. It's also a good idea to know your test results and keep a list of the medicines you take. How can you care for yourself at home? Watch for symptoms of depression  The symptoms of depression are often subtle at first. You may think they are caused by your disease rather than depression. Or you may think it is normal to be depressed when you have a chronic disease. If you are depressed you may:  · Feel sad or hopeless. · Feel guilty or worthless. · Not enjoy the things you used to enjoy. · Feel hopeless, as though life is not worth living. · Have trouble thinking or remembering. · Have low energy, and you may not eat or sleep well. · Pull away from others. · Think often about death or killing yourself. (Keep the numbers for these national suicide hotlines: 7-587-679-TALK [1-642.653.1430] and 6-036-ATEJMDR [1-231.836.8578]. )  Get treatment  By treating your depression, you can feel more hopeful and have more energy. If you feel better, you may take better care of yourself, so your health may improve. · Talk to your doctor if you have any changes in mood during treatment for your disease. · Ask your doctor for help.  Counseling, antidepressant medicine, or a combination of the two can help most people with depression. Often a combination works best. Counseling can also help you cope with having a chronic disease. When should you call for help? Call 911 anytime you think you may need emergency care. For example, call if:    · You feel like hurting yourself or someone else.     · Someone you know has depression and is about to attempt or is attempting suicide. Call your doctor now or seek immediate medical care if:    · You hear voices.     · Someone you know has depression and:  ? Starts to give away his or her possessions. ? Uses illegal drugs or drinks alcohol heavily. ? Talks or writes about death, including writing suicide notes or talking about guns, knives, or pills. ? Starts to spend a lot of time alone. ? Acts very aggressively or suddenly appears calm. Watch closely for changes in your health, and be sure to contact your doctor if:    · You do not get better as expected. Where can you learn more? Go to https://FeZo.SL8Z | [employer]. org and sign in to your Storee account. Enter S928 in the MoBank box to learn more about \"Depression and Chronic Disease: Care Instructions. \"     If you do not have an account, please click on the \"Sign Up Now\" link. Current as of: June 16, 2021               Content Version: 13.0  © 0890-2830 Healthwise, Incorporated. Care instructions adapted under license by [facility] ([facility]). If you have questions about a medical condition or this instruction, always ask your healthcare professional. [place] [address] any warranty or liability for your use of this information.

## 2021-12-14 NOTE — TELEPHONE ENCOUNTER
Please Approve or Refuse.   Send to Pharmacy per Pt's Request:      Next Visit Date:  1/21/2022   Last Visit Date: 12/14/2021    Hemoglobin A1C (%)   Date Value   04/08/2019 5.6             ( goal A1C is < 7)   BP Readings from Last 3 Encounters:   12/14/21 120/84   10/11/21 126/88   08/23/21 (!) 123/102          (goal 120/80)  BUN   Date Value Ref Range Status   08/23/2021 13 6 - 20 mg/dL Final     CREATININE   Date Value Ref Range Status   08/23/2021 0.87 0.50 - 0.90 mg/dL Final     Potassium   Date Value Ref Range Status   08/23/2021 4.8 3.7 - 5.3 mmol/L Final

## 2021-12-21 DIAGNOSIS — M25.512 CHRONIC LEFT SHOULDER PAIN: ICD-10-CM

## 2021-12-21 DIAGNOSIS — G89.29 CHRONIC LEFT SHOULDER PAIN: ICD-10-CM

## 2021-12-21 DIAGNOSIS — M51.36 DDD (DEGENERATIVE DISC DISEASE), LUMBAR: ICD-10-CM

## 2021-12-21 DIAGNOSIS — M54.50 ACUTE BILATERAL LOW BACK PAIN WITHOUT SCIATICA: ICD-10-CM

## 2021-12-22 RX ORDER — BACLOFEN 10 MG/1
TABLET ORAL
Qty: 90 TABLET | Refills: 0 | Status: SHIPPED | OUTPATIENT
Start: 2021-12-22 | End: 2022-04-04

## 2022-02-17 ENCOUNTER — HOSPITAL ENCOUNTER (OUTPATIENT)
Age: 52
Discharge: HOME | End: 2022-02-17
Payer: MEDICARE

## 2022-02-17 LAB
ALBUMIN SERPL-MCNC: 4.1 G/DL (ref 3.5–5.2)
ALBUMIN/GLOBULIN RATIO: NORMAL (ref 1–2.5)
ALP BLD-CCNC: 91 U/L (ref 35–104)
ALT SERPL-CCNC: 22 U/L (ref 5–33)
AST SERPL-CCNC: 17 U/L
BILIRUB SERPL-MCNC: 0.34 MG/DL (ref 0.3–1.2)
BILIRUBIN DIRECT: <0.08 MG/DL
BILIRUBIN, INDIRECT: NORMAL MG/DL (ref 0–1)
GLOBULIN: NORMAL G/DL (ref 1.5–3.8)
HAV IGM SER IA-ACNC: NONREACTIVE
HEPATITIS B CORE IGM ANTIBODY: NONREACTIVE
HEPATITIS B SURFACE ANTIGEN: NONREACTIVE
HEPATITIS C ANTIBODY: NONREACTIVE
HEPATITIS C ANTIBODY: NONREACTIVE
TOTAL PROTEIN: 6.8 G/DL (ref 6.4–8.3)

## 2022-02-17 PROCEDURE — 86803 HEPATITIS C AB TEST: CPT

## 2022-02-17 PROCEDURE — 80076 HEPATIC FUNCTION PANEL: CPT

## 2022-02-17 PROCEDURE — 36415 COLL VENOUS BLD VENIPUNCTURE: CPT

## 2022-02-17 PROCEDURE — 80074 ACUTE HEPATITIS PANEL: CPT

## 2022-02-18 NOTE — RESULT ENCOUNTER NOTE
Please notify patient: Liver function tests are now normalized, that means she might have changed something in the diet keep eating fruits and vegetables and avoid fried foods, and greasy foods, more than 3 eggs per week, exercise    Has no hepatitis A, B or C which again is good  Future Appointments  5/24/2022  9:00 AM    Grupo Wright MD     Psychiatric               [facility]

## 2022-04-04 DIAGNOSIS — M54.50 ACUTE BILATERAL LOW BACK PAIN WITHOUT SCIATICA: ICD-10-CM

## 2022-04-04 DIAGNOSIS — G89.29 CHRONIC LEFT SHOULDER PAIN: ICD-10-CM

## 2022-04-04 DIAGNOSIS — M25.512 CHRONIC LEFT SHOULDER PAIN: ICD-10-CM

## 2022-04-04 DIAGNOSIS — M51.36 DDD (DEGENERATIVE DISC DISEASE), LUMBAR: ICD-10-CM

## 2022-04-04 RX ORDER — BACLOFEN 10 MG/1
TABLET ORAL
Qty: 90 TABLET | Refills: 2 | Status: SHIPPED | OUTPATIENT
Start: 2022-04-04 | End: 2022-06-21 | Stop reason: ALTCHOICE

## 2022-04-23 DIAGNOSIS — M51.36 DDD (DEGENERATIVE DISC DISEASE), LUMBAR: ICD-10-CM
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
## 2022-04-23 DIAGNOSIS — M54.50 ACUTE BILATERAL LOW BACK PAIN WITHOUT SCIATICA: ICD-10-CM

## 2022-04-25 NOTE — TELEPHONE ENCOUNTER
Please Approve or Refuse.   Send to Pharmacy per Pt's Request:      Next Visit Date:  5/24/2022   Last Visit Date: 12/14/2021    Hemoglobin A1C (%)   Date Value   04/08/2019 5.6             ( goal A1C is < 7)   BP Readings from Last 3 Encounters:   12/14/21 120/84   10/11/21 126/88   08/23/21 (!) 123/102          (goal 120/80)  BUN   Date Value Ref Range Status   08/23/2021 13 6 - 20 mg/dL Final     CREATININE   Date Value Ref Range Status   08/23/2021 0.87 0.50 - 0.90 mg/dL Final     Potassium   Date Value Ref Range Status   08/23/2021 4.8 3.7 - 5.3 mmol/L Final

## 2022-06-06 DIAGNOSIS — K76.0 FATTY LIVER: ICD-10-CM

## 2022-06-06 DIAGNOSIS — F41.9 ANXIETY: ICD-10-CM

## 2022-06-06 DIAGNOSIS — N32.81 OAB (OVERACTIVE BLADDER): ICD-10-CM

## 2022-06-07 RX ORDER — OXYBUTYNIN CHLORIDE 10 MG/1
10 TABLET, EXTENDED RELEASE ORAL DAILY
Qty: 90 TABLET | Refills: 3 | Status: SHIPPED | OUTPATIENT
Start: 2022-06-07

## 2022-06-07 RX ORDER — CALCIUM CARBONATE/VITAMIN D3 600 MG-10
TABLET ORAL
Qty: 30 CAPSULE | Refills: 3 | Status: SHIPPED | OUTPATIENT
Start: 2022-06-07

## 2022-06-07 RX ORDER — BUSPIRONE HYDROCHLORIDE 10 MG/1
10 TABLET ORAL EVERY EVENING
Qty: 90 TABLET | Refills: 3 | Status: SHIPPED | OUTPATIENT
Start: 2022-06-07 | End: 2022-06-21 | Stop reason: ALTCHOICE

## 2022-06-14 ENCOUNTER — HOSPITAL ENCOUNTER (EMERGENCY)
Age: 52
Discharge: HOME | End: 2022-06-14
Attending: STUDENT IN AN ORGANIZED HEALTH CARE EDUCATION/TRAINING PROGRAM
Payer: MEDICARE

## 2022-06-14 ENCOUNTER — APPOINTMENT (OUTPATIENT)
Dept: GENERAL RADIOLOGY | Age: 52
End: 2022-06-14
Payer: MEDICARE

## 2022-06-14 VITALS
RESPIRATION RATE: 15 BRPM | DIASTOLIC BLOOD PRESSURE: 49 MMHG | OXYGEN SATURATION: 97 % | TEMPERATURE: 97.4 F | HEIGHT: 63 IN | BODY MASS INDEX: 37.92 KG/M2 | WEIGHT: 214 LBS | HEART RATE: 96 BPM | SYSTOLIC BLOOD PRESSURE: 142 MMHG

## 2022-06-14 DIAGNOSIS — S69.91XA INJURY OF RIGHT HAND, INITIAL ENCOUNTER: Primary | ICD-10-CM

## 2022-06-14 PROCEDURE — 99283 EMERGENCY DEPT VISIT LOW MDM: CPT

## 2022-06-14 PROCEDURE — 73130 X-RAY EXAM OF HAND: CPT

## 2022-06-14 PROCEDURE — 6370000000 HC RX 637 (ALT 250 FOR IP): Performed by: STUDENT IN AN ORGANIZED HEALTH CARE EDUCATION/TRAINING PROGRAM

## 2022-06-14 RX ORDER — IBUPROFEN 200 MG
400 TABLET ORAL ONCE
Status: COMPLETED | OUTPATIENT
Start: 2022-06-14 | End: 2022-06-14

## 2022-06-14 RX ADMIN — IBUPROFEN 400 MG: 200 TABLET, FILM COATED ORAL at 21:53

## 2022-06-14 ASSESSMENT — LIFESTYLE VARIABLES: HOW OFTEN DO YOU HAVE A DRINK CONTAINING ALCOHOL: NEVER

## 2022-06-14 ASSESSMENT — ENCOUNTER SYMPTOMS
BACK PAIN: 0
RHINORRHEA: 0
COUGH: 0
NAUSEA: 0
SHORTNESS OF BREATH: 0
VOMITING: 0
ABDOMINAL PAIN: 0

## 2022-06-14 ASSESSMENT — PAIN DESCRIPTION - ORIENTATION: ORIENTATION: RIGHT

## 2022-06-14 ASSESSMENT — PAIN - FUNCTIONAL ASSESSMENT: PAIN_FUNCTIONAL_ASSESSMENT: 0-10

## 2022-06-14 ASSESSMENT — PAIN SCALES - GENERAL
PAINLEVEL_OUTOF10: 8
PAINLEVEL_OUTOF10: 9

## 2022-06-14 ASSESSMENT — PAIN DESCRIPTION - LOCATION: LOCATION: HAND

## 2022-06-14 ASSESSMENT — PAIN DESCRIPTION - PAIN TYPE: TYPE: ACUTE PAIN

## 2022-06-14 ASSESSMENT — PAIN DESCRIPTION - FREQUENCY: FREQUENCY: CONTINUOUS

## 2022-06-14 ASSESSMENT — PAIN DESCRIPTION - DESCRIPTORS: DESCRIPTORS: ACHING

## 2022-06-15 NOTE — ED PROVIDER NOTES
[facility]  Emergency Department Encounter  Emergency Medicine Physician     Pt Name: Dorothy Palacio  MRN: [id number]  [place] 1970  Date of evaluation: 6/14/22  PCP:  ИВАН Lewis CNP    CHIEF COMPLAINT       Chief Complaint   Patient presents with    Hand Injury       HISTORY OF PRESENT ILLNESS  (Location/Symptom, Timing/Onset, Context/Setting, Quality, Duration, Modifying Factors, Severity.)    Dorothy Palacio is a 46 y.o. female who presents with right hand injury yesterday. She states that she was walking quickly through a door and pushed to push oral door and believes that her fingers might of gotten caught Raptor on the door and when she pulled her hand back she thinks she might of accidentally pulled on her fingers. States that since then she has had some stiffness to the dorsal aspect of her right hand. She is right-handed. Denies any wrist pain. Denies any snuffbox tenderness. Denies any paresthesias. PAST MEDICAL / SURGICAL / SOCIAL / FAMILY HISTORY    has a past medical history of Anxiety, CAD (coronary artery disease), Chronic back pain, Depression, Fatty liver, Fibromyalgia, Headache(784.0), High cholesterol, and Palpitations. has a past surgical history that includes Appendectomy; Tubal ligation; Spine surgery; and Cystoscopy (N/A, 5/24/2019). Social History     Socioeconomic History    Marital status: Single     Spouse name: Not on file    Number of children: Not on file    Years of education: Not on file    Highest education level: Not on file   Occupational History     Employer: N/A   Tobacco Use    Smoking status: Never Smoker    Smokeless tobacco: Never Used   Vaping Use    Vaping Use: Never used   Substance and Sexual Activity    Alcohol use:  Yes     Alcohol/week: 0.0 standard drinks     Comment: rarely    Drug use: No    Sexual activity: Not Currently   Other Topics Concern    Not on file   Social History Narrative    Not on file     Social Determinants of Health     Financial Resource Strain: Low Risk     Difficulty of Paying Living Expenses: Not very hard   Food Insecurity: No Food Insecurity    Worried About Running Out of Food in the Last Year: Never true    Laurence of Food in the Last Year: Never true   Transportation Needs:     Lack of Transportation (Medical): Not on file    Lack of Transportation (Non-Medical): Not on file   Physical Activity:     Days of Exercise per Week: Not on file    Minutes of Exercise per Session: Not on file   Stress:     Feeling of Stress : Not on file   Social Connections:     Frequency of Communication with Friends and Family: Not on file    Frequency of Social Gatherings with Friends and Family: Not on file    Attends Catholic Services: Not on file    Active Member of [address] [place] [address] [employer] or Organizations: Not on file    Attends Club or Organization Meetings: Not on file    Marital Status: Not on file   Intimate Partner Violence:     Fear of Current or Ex-Partner: Not on file    Emotionally Abused: Not on file    Physically Abused: Not on file    Sexually Abused: Not on file   Housing Stability:     Unable to Pay for Housing in the Last Year: Not on file    Number of [place] in the Last Year: Not on file    Unstable Housing in the Last Year: Not on file       Family History   Problem Relation Age of Onset    Diabetes Mother     Cancer Mother         breast  age 76 cervical     Alcohol Abuse Father     Coronary Art Dis Sister     Cervical Cancer Sister     Breast Cancer Sister         age 48       Allergies:    Effexor [venlafaxine hcl], Morphine, Morphine sulfate, Pcn [penicillins], and Penicillin g    Home Medications:  Prior to Admission medications    Medication Sig Start Date End Date Taking?  Authorizing Provider   busPIRone (BUSPAR) 10 MG tablet TAKE 1 TABLET BY MOUTH EVERY EVENING 6/7/22   Miri Singleton MD   oxybutynin (DITROPAN-XL) 10 mg extended release tablet TAKE 1 TABLET BY MOUTH DAILY 6/7/22   Arnol Noe MD   VITAMIN E/D-ALPHA NATURAL 268 MG (400 UNIT) CAPS TAKE 1 CAPSULE BY MOUTH DAILY 6/7/22   Arnol Noe MD   diclofenac sodium (VOLTAREN) 1 % GEL APPLY 2 GRAMS TOPICALLY EVERY 6 HOURS AS NEEDED FOR PAIN AND SWELLING IN JOINT 4/25/22   Arnol Noe MD   baclofen (LIORESAL) 10 MG tablet TAKE 1 TABLET BY MOUTH THREE TIMES DAILY AS NEEDED FOR MUSCLE SPASMS 4/4/22   ИВАН Van CNP   Melatonin 1 MG CHEW Take by mouth    Historical Provider, MD   sertraline (ZOLOFT) 50 MG tablet Take 1 tablet by mouth daily 12/14/21   ИВАН Van CNP   vitamin D (CHOLECALCIFEROL) 25 MCG (1000 UT) TABS tablet Take 1 tablet by mouth daily 12/14/21 3/14/22  ИВАН Van CNP   dexamethasone (DECADRON) 0.1 % ophthalmic suspension Place 1 drop into both eyes 2 times daily 12/14/21   ИВАН Van CNP   MAGNESIUM-OXIDE 400 (241.3 Mg) MG TABS tablet TAKE 1 TABLET BY MOUTH DAILY 12/14/21   ИВАН Van CNP   methocarbamol (ROBAXIN) 500 MG tablet Take 1 tablet by mouth 4 times daily as needed (back pain) 8/26/21   Holly Umana MD   Omega-3 Fatty Acids (FISH OIL PO) Take by mouth daily     Historical Provider, MD   IBUPROFEN PO Take by mouth as needed    Historical Provider, MD   Multiple Vitamins-Minerals (IMMUNE SUPPORT PO) Take by mouth daily    Historical Provider, MD   lidocaine (LMX) 4 % cream Apply 2-3 times a day as needed for pain 9/15/20   Holly Umana MD       REVIEW OF SYSTEMS    (2-9 systems for level 4, 10 or more for level 5)    Review of Systems   Constitutional: Negative for chills, fatigue and fever. HENT: Negative for congestion and rhinorrhea. Respiratory: Negative for cough and shortness of breath. Cardiovascular: Negative for chest pain. Gastrointestinal: Negative for abdominal pain, nausea and vomiting. Musculoskeletal: Negative for back pain and myalgias.         +right hand pain   Neurological: Negative for headaches. All other systems reviewed and are negative. PHYSICAL EXAM   (up to 7 for level 4, 8 or more for level 5)    INITIAL VITALS:   ED Triage Vitals [06/14/22 2019]   BP Temp Temp src Heart Rate Resp SpO2 Height Weight   (!) 142/49 97.4 °F (36.3 °C) -- 96 15 97 % 5' 3\" (1.6 m) 214 lb (97.1 kg)       Physical Exam  Vitals and nursing note reviewed. Constitutional:       General: She is not in acute distress. Appearance: She is well-developed. Cardiovascular:      Rate and Rhythm: Normal rate and regular rhythm. Pulses:           Radial pulses are 2+ on the right side and 2+ on the left side. Heart sounds: Normal heart sounds. No murmur heard. Pulmonary:      Effort: Pulmonary effort is normal. No respiratory distress. Breath sounds: Normal breath sounds. No decreased breath sounds. Abdominal:      General: There is no distension. Palpations: Abdomen is soft. Tenderness: There is no abdominal tenderness. Musculoskeletal:         General: Tenderness present. No swelling or deformity. Right hand: Tenderness present. No swelling, deformity, lacerations or bony tenderness. Decreased range of motion. Normal sensation. Normal capillary refill. Hands:       Comments: Tenderness to palpation to the indicated area   Skin:     General: Skin is warm and dry. Capillary Refill: Capillary refill takes less than 2 seconds. Neurological:      Mental Status: She is alert and oriented to person, place, and time.          DIFFERENTIAL  DIAGNOSIS   PLAN (LABS / IMAGING / EKG):  Orders Placed This Encounter   Procedures    XR HAND RIGHT (MIN 3 VIEWS)    [employer] Wrist Brace, Right       MEDICATIONS ORDERED:  Orders Placed This Encounter   Medications    ibuprofen (ADVIL;MOTRIN) tablet 400 mg         DIAGNOSTIC RESULTS / EMERGENCYDEPARTMENT COURSE / MDM   LABS:  Labs Reviewed - No data to display    RADIOLOGY:  XR HAND RIGHT (MIN 3 VIEWS)    Result Date: 6/14/2022  EXAMINATION: THREE XRAY VIEWS OF THE RIGHT HAND 6/14/2022 9:54 pm COMPARISON: 08/26/2012 HISTORY: ORDERING SYSTEM PROVIDED HISTORY: caught hand in door, dorsal hand pain TECHNOLOGIST PROVIDED HISTORY: caught hand in door, dorsal hand pain Reason for Exam: caught hand in door, dorsal hand pain Additional signs and symptoms: caught hand in door, dorsal hand pain Relevant Medical/Surgical History: caught hand in door, dorsal hand pain FINDINGS: There is no evidence of acute fracture. There is normal alignment. No acute joint abnormality. No focal osseous lesion. No focal soft tissue abnormality. No acute osseous abnormality. Impression:  Patient presents with right hand pain. On the dorsal aspect. Most of the tenderness is over the extensor tendons on the dorsal aspect of the third and fourth digit. Distal sensation intact. Passive range of motion is intact however active range of motion patient is not able to fully flex her fingers but she can fully extend them. No wrist pain, no snuffbox tenderness. No open wound. We will get x-ray, reassess. She states that she took Tylenol just prior to arrival.      EMERGENCY DEPARTMENT COURSE:   X-ray as above, no acute bony abnormality. Concern for possible extensor tendon strain. Will place patient in a wrist splint for comfort. Paperwork filled out regarding Workmen's Compensation. Patient safe for discharge      PROCEDURES:  none    CONSULTS:  None    CRITICAL CARE:  There was a high probability of clinically significant/life threatening deterioration in this patient's condition which required my urgent intervention. Total critical care time was 7 minutes. This excludes any time for separately reportable procedures. FINAL IMPRESSION     1.  Injury of right hand, initial encounter          DISPOSITION / PLAN   DISPOSITION Decision To Discharge 06/14/2022 11:07:15 PM      Evaluation and treatment course in the ED, and plan of care upon discharge was discussed in length with the patient/patient representative. Patient/patient representative had no further questions prior to being discharged and was instructed to return to the ED for new or worsening symptoms. Any changes to existing medications or new prescriptions were reviewed with patient/patient representative and they expressed understanding of how to correctly take their medications and the possible side effects.     PATIENT REFERRED TO:  ИВАН Munoz - CNP  [facility]  [address] [place] [address]  [phone]    Schedule an appointment as soon as possible for a visit   As needed      DISCHARGE MEDICATIONS:  Discharge Medication List as of 6/14/2022 11:07 PM          Cortney Solis DO  Emergency Medicine Attending    (Please note that portions of this note were completed with a voice recognition program.  Efforts were made to edit the dictations but occasionally words are mis-transcribed.)         Cortney Solis DO  06/15/22 [id number]

## 2022-06-15 NOTE — ED TRIAGE NOTES
Pt injured her hand yesterday at work by hitting it on a door as she was passing through. Pain and swelling to rt hand.

## 2022-06-20 ENCOUNTER — TELEPHONE (OUTPATIENT)
Dept: FAMILY MEDICINE CLINIC | Age: 52
End: 2022-06-20

## 2022-06-20 ENCOUNTER — NURSE TRIAGE (OUTPATIENT)
Dept: OTHER | Facility: CLINIC | Age: 52
End: 2022-06-20

## 2022-06-20 PROBLEM — M43.10 RETROLISTHESIS: Status: ACTIVE | Noted: 2022-06-20

## 2022-06-20 NOTE — TELEPHONE ENCOUNTER
Received call from Dawit Sotelo at [facility] with [facility]. Subjective: Caller states \"numbness in fingertips, I've actually had it for a month now. Its numbness in my fingertips, my hands and sometimes shooting pains up my arm\"     Current Symptoms:   -numbness and tingling in bilateral hands and fingertips.  -Intermittent left arm pain-went to ED-recommended following with PCP and cardiologist   -dull neck pain    Onset: 1 month ago; worsening    Associated Symptoms: NA    Pain Severity: mild/10; dull, aching; waxing and waning-left arm pain    Temperature: Denies     What has been tried: ibuprofen, aleve, heat and ice, changing sleep position    LMP: NA Pregnant: NA    Recommended disposition: See in Office Today    Care advice provided, patient verbalizes understanding; denies any other questions or concerns; instructed to call back for any new or worsening symptoms. Patient/Caller agrees with recommended disposition; writer provided warm transfer to Marychuy Duran at [facility] for appointment scheduling     Attention Provider: Thank you for allowing me to participate in the care of your patient. The patient was connected to triage in response to information provided to the ECC/PSC. Please do not respond through this encounter as the response is not directed to a shared pool.         Reason for Disposition   Neck pain (with neurologic deficit)    Protocols used: NEUROLOGIC DEFICIT-ADULT-OH independent

## 2022-06-20 NOTE — PROGRESS NOTES
[facility] PHYSICIANS  [facility] PHYSIC[facility]ANS [facility]  [address]  [address] [place] [address]  Dept: 1 [facility]  (:  1970) is a 46 y.o. female. Patient is here for evaluation of the following chief complaint(s):  Chief Complaint   Patient presents with   [facility] Follow-up     chest pressure, tingling and numbness in hands and fingers and arms         SUBJECTIVE/OBJECTIVE:  HPI  Srini Bauman is a 46 y.o. female patient. Patient is an established patient of mine. Patient has a known history of chronic neck pain, chronic back pain, atypical chest pain, fibromyalgia, and adjustment disorder  . CHEST PAIN  Srini Bauman is a 46 y.o. female patient came in today as a follow-up from 2 ER visits that she has had recently. Patient had some chest discomfort that she has had for several months which comes and goes this is very vague. Patient had a cardiac work-up in the emergency room which all came back normal.  She did not have any troponin increased EKG was normal x-ray was normal.  Despite everything coming back normal with her age we are going to consider referring her to the cardiologist.    CERVICAL RADICULOPATHY/LUMBAR RADICULOPATHY  Patient have always had some chronic neck and back pain so we struggled with this. She has had an old MRI in 2019 and was sent to the specialist but never really followed through. She was seen by the neurologist but did not necessarily continue with the treatment. Patient is an established patient of  Dr Denver Slater, Wants to transfer to another provider. Will refer to another provider. Was taking ibuprofen. With very minimal relief. She does complain of some numbness on both arms   She just started working as a  at [employer] she had been doing a lot of mopping/repetitive motion. She has been working long hours. She has noticed some worsening of her symptoms ever since starting this new job.    cervical MRI 2019       Impression   Multilevel degenerative disc disease with associated uncovertebral and facet   hypertrophy. Moderate right and severe left foraminal narrowing at C6-7. Lab Results   Component Value Date    CREATININE 0.87 08/23/2021     WEIGHT  Patient's BMI is Body mass index is 38.19 kg/m². kg/m2. BMI is increasing. Patient understands that this condition increases the patient's risk for chronic conditions. Wt Readings from Last 3 Encounters:   06/21/22 215 lb 9.6 oz (97.8 kg)   06/14/22 214 lb (97.1 kg)   12/14/21 226 lb 3.2 oz (102.6 kg)       DEPRESSION AND ANXIETY  Clem Shen reported some ongoing issues with depression and anxiety. Current therapy includes Zoloft, which is working well for her. she denies adverse reaction to current therapy. she also denies suicidal/homicidal ideation, plan or intent. .  PHQ-2 Over the past 2 weeks, how often have you been bothered by any of the following problems? Little interest or pleasure in doing things: Not at all  Feeling down, depressed, or hopeless: Not at all  PHQ-2 Score: 0  PHQ-9 Over the past 2 weeks, how often have you been bothered by any of the following problems? Trouble falling or staying asleep, or sleeping too much: Not at all  Feeling tired or having little energy: Several days  Poor appetite or overeating: Not at all  Feeling bad about yourself - or that you are a failure or have let yourself or your family down: Not at all  Trouble concentrating on things, such as reading the newspaper or watching television: Not at all  Moving or speaking so slowly that other people could have noticed.  Or the opposite - being so fidgety or restless that you have been moving around a lot more than usual: Not at all  Thoughts that you would be better off dead, or of hurting yourself in some way: Not at all  If you checked off any problems, how difficult have these problems made it for you to do your work, take care of things at home, or get along with other people?: Not difficult at all  PHQ-9 Total Score: 1  PHQ-9 Total Score: 1   TROY-7 SCREENING 12/14/2021 9/15/2020 11/7/2018   Feeling nervous, anxious, or on edge Not at all - -   Not being able to stop or control worrying Not at all - -   Worrying too much about different things More than half the days - -   Trouble relaxing Not at all - -   Being so restless that it is hard to sit still Not at all - -   Becoming easily annoyed or irritable Not at all - -   Feeling afraid as if something awful might happen Not at all - -   TROY-7 Total Score 2 - -   How difficult have these problems made it for you to do your work, take care of things at home, or get along with other people? Not difficult at all - -   Feeling nervous, anxious, or on edge - 0-Not at all 3-Nearly every day   Not able to stop or control worrying - 0-Not at all 3-Nearly every day   Worrying too much about different things - 0-Not at all 3-Nearly every day   Trouble relaxing - 0-Not at all 3-Nearly every day   Being so restless that it's hard to sit still - 0-Not at all 1-Several days   Becoming easily annoyed or irritable - 1-Several days 3-Nearly every day   Feeling afraid as if something awful might happen - 0-Not at all 3-Nearly every day   TROY-7 Total Score - 1 19       PHQ Scores 6/21/2022 12/14/2021 10/11/2021 3/12/2021 9/15/2020 11/7/2018 1/27/2015   PHQ2 Score 0 0 0 0 0 6 6   PHQ9 Score 1 0 0 0 0 15 19     VITAL SIGNS:  Vitals:    06/21/22 1130   BP: 122/76   Pulse: 69   Temp: 98.4 °F (36.9 °C)   SpO2: 97%   Weight: 215 lb 9.6 oz (97.8 kg)   Height: 5' 3\" (1.6 m)   Estimated body mass index is 38.19 kg/m² as calculated from the following:    Height as of this encounter: 5' 3\" (1.6 m). Weight as of this encounter: 215 lb 9.6 oz (97.8 kg). Review of Systems   Constitutional: Positive for activity change. Negative for chills and fever. Eyes: Negative for discharge and itching.    Respiratory: Negative for shortness of breath and wheezing. Cardiovascular: Positive for chest pain. Negative for palpitations. Gastrointestinal: Negative for abdominal pain. Genitourinary: Negative for dysuria. OAB   Musculoskeletal: Positive for arthralgias, back pain, gait problem, joint swelling and neck pain. Neurological: Positive for numbness. Negative for weakness and headaches. Psychiatric/Behavioral: Negative for dysphoric mood, sleep disturbance and suicidal ideas. The patient is nervous/anxious. Physical Exam  Vitals and nursing note reviewed. Constitutional:       Appearance: Normal appearance. She is obese. HENT:      Head: Normocephalic. Nose: Nose normal.   Cardiovascular:      Rate and Rhythm: Normal rate and regular rhythm. Pulmonary:      Effort: Pulmonary effort is normal.      Breath sounds: Normal breath sounds. Musculoskeletal:      Cervical back: No tenderness. Decreased range of motion. Thoracic back: No tenderness. Decreased range of motion. Lumbar back: No tenderness. Decreased range of motion. Skin:     General: Skin is warm and dry. Neurological:      Mental Status: She is alert and oriented to person, place, and time. Sensory: Sensory deficit present. Motor: No weakness. Psychiatric:         Mood and Affect: Mood is anxious. Speech: Speech is rapid and pressured. Thought Content: Thought content does not include suicidal ideation. MEDICAL HISTORY      Diagnosis Date    Anxiety     unison ,on meds in past     CAD (coronary artery disease) 5/5/2014    Chronic back pain     Depression     post partum and childhood    Fatty liver 3/10/2021    Fibromyalgia     Headache(784.0)     High cholesterol     Palpitations       MEDICATIONS  Prior to Visit Medications    Medication Sig Taking?  Authorizing Provider   naproxen (NAPROSYN) 500 MG tablet Take 1 tablet by mouth 2 times daily (with meals) Yes ИВАН Van - CNP gabapentin (NEURONTIN) 100 MG capsule Take 1 capsule by mouth 3 times daily for 180 days. Intended supply: 90 days Yes ИВАН Van CNP   VITAMIN E/D-ALPHA NATURAL 268 MG (400 UNIT) CAPS TAKE 1 CAPSULE BY MOUTH DAILY Yes Miri Singleton MD   diclofenac sodium (VOLTAREN) 1 % GEL APPLY 2 GRAMS TOPICALLY EVERY 6 HOURS AS NEEDED FOR PAIN AND SWELLING IN JOINT Yes Miri Singleton MD   Melatonin 1 MG CHEW Take by mouth Yes Historical Provider, MD   sertraline (ZOLOFT) 50 MG tablet Take 1 tablet by mouth daily Yes ИВАН Van CNP   vitamin D (CHOLECALCIFEROL) 25 MCG (1000 UT) TABS tablet Take 1 tablet by mouth daily Yes ИВАН Van CNP   MAGNESIUM-OXIDE 400 (241.3 Mg) MG TABS tablet TAKE 1 TABLET BY MOUTH DAILY Yes ИВАН Van CNP   Omega-3 Fatty Acids (FISH OIL PO) Take by mouth daily  Yes Historical Provider, MD   Multiple Vitamins-Minerals (IMMUNE SUPPORT PO) Take by mouth daily Yes Historical Provider, MD   lidocaine (LMX) 4 % cream Apply 2-3 times a day as needed for pain Yes Modesto Min MD   oxybutynin (DITROPAN-XL) 10 mg extended release tablet TAKE 1 TABLET BY MOUTH DAILY  Miri Singleton MD     Controlled Substance Monitoring:  Acute and Chronic Pain Monitoring:   RX Monitoring 3/12/2021   Attestation -   Periodic Controlled Substance Monitoring Possible medication side effects, risk of tolerance/dependence & alternative treatments discussed. ;No signs of potential drug abuse or diversion identified. ;Assessed functional status. ASSESSMENT/PLAN:  1. Atypical chest pain  Failure to Improve  DISCUSSED and ADVISED TO:  Discussed serious causes of CP. Advised to go to the ER for worsening CP/SOB           2. Cervical radiculopathy  Failure to Improve  Continue current therapy. DISCUSSED and ADVISED TO:  Stay at a healthy weight. Continue exercises/PT  Stretch to help prevent stiffness and to prevent injury before exercise.    Gentle forms of yoga help keep joints and muscles flexible. Walk instead of jog, ride a bike, swim, and water exercise. Lift weights as tolerated. strong muscles help reduce stress on joints. Take pain medicines exactly as directed and only as needed. - naproxen (NAPROSYN) 500 MG tablet; Take 1 tablet by mouth 2 times daily (with meals)  Dispense: 180 tablet; Refill: 1  - [address], Dulzura, DO, Neurosurgery, [place]  - DRUG SCREEN, PAIN; Future  - gabapentin (NEURONTIN) 100 MG capsule; Take 1 capsule by mouth 3 times daily for 180 days. Intended supply: 90 days  Dispense: 270 capsule; Refill: 1  - MRI CERVICAL SPINE WO CONTRAST; Future    3. DDD (degenerative disc disease), cervical  Failure to Improve  Continue current therapy. DISCUSSED AND ADVISED TO:  Use heat packs 15 to 20 mins every 2-3 hours. Do some back stretches as tolerated. Refer to hand out for instructions. Call for worsening, numbness, weakness. - [address], Dulzura, DO, Neurosurgery, [place]  - DRUG SCREEN, PAIN; Future  - gabapentin (NEURONTIN) 100 MG capsule; Take 1 capsule by mouth 3 times daily for 180 days. Intended supply: 90 days  Dispense: 270 capsule; Refill: 1  - MRI CERVICAL SPINE WO CONTRAST; Future    4. Fibromyalgia  Failure to Improve  DISCUSSED AND ADVISED TO:  Exercise often. Get a good night's sleep. Make healthy changes to diet. Quit smoking   Try to reduce stress  Carefully take medications as discussed  Report for worsening symptoms          - DRUG SCREEN, PAIN; Future  - gabapentin (NEURONTIN) 100 MG capsule; Take 1 capsule by mouth 3 times daily for 180 days. Intended supply: 90 days  Dispense: 270 capsule; Refill: 1    5. Chronic midline low back pain without sciatica  Failure to Improve  Continue current therapy. DISCUSSED AND ADVISED TO:  Use heat packs 15 to 20 mins every 2-3 hours. Do some back stretches as tolerated. Refer to hand out for instructions. Call for worsening, numbness, weakness.         6. Adjustment disorder with anxious mood  Stable  Continue current therapy. DISCUSSED and ADVISED TO:  Not stopping medication suddenly. See the specialist as discussed. Report for feelings of SI, HI, and hallucinations. Go to the ER for increasing urge to hurt yourself. 7. Class 2 severe obesity due to excess calories with serious comorbidity and body mass index (BMI) of 38.0 to 38.9 in adult [facility])  Failure to Improve  BMI increasing  DISCUSSED AND ADVISED TO:  Eat a low-fat and low carbohydrates diet. Avoid fried foods especially fast food. Choose healthier options for snacks. Have 5-6 servings of fruits and vegetables per day. Cut down on eating processed food. Add 30 minutes to 1 hour aerobic exercise for 3-4 days a week. On this date 6/21/2022 I have spent 35 minutes reviewing previous notes, test results and face to face with the patient discussing the diagnosis and importance of compliance with the treatment plan as well as documenting on the day of the visit. Return in about 3 months (around 9/21/2022) for Chronic conditions, 30mins. This note was completed by using the assistance of a speech-recognition program. However, inadvertent computerized transcription errors may be present. Although every effort was made to ensure accuracy, no guarantees can be provided that every mistake has been identified and corrected by editing.   Electronically signed by ИВАН Ortiz CNP on 6/20/22 at 5:54 PM EDT     --ИВАН Ortiz CNP

## 2022-06-20 NOTE — TELEPHONE ENCOUNTER
[facility] ED Follow up Call    Reason for ED visit:  [facility] MAD[facility], DX:NUMBNESS IN HANDS/TINGLING,         Kip Alvarez , this is KANDACE from Dr. Tereso Willis's office, just calling to see how you are doing after your recent ED visit. Did you receive discharge instructions? Yes  Do you understand the discharge instructions? Yes  Did the ED give you any new prescriptions? No:   Were you able to fill your prescriptions? No:       Do you have one of our red, yellow and green  Zone sheets that help you to determine when you should go to the ED? Yes      Do you need or want to make a follow up appt with your PCP? Yes    Do you have any further needs in the home i.e. Equipment?   No        FU appts/Provider:    Future Appointments   Date Time Provider Derek Rosas   6/21/2022 11:15 AM Melissa Willis, ИВАН - CNP fp [facility]

## 2022-06-21 ENCOUNTER — OFFICE VISIT (OUTPATIENT)
Dept: FAMILY MEDICINE CLINIC | Age: 52
End: 2022-06-21
Payer: MEDICARE

## 2022-06-21 VITALS
BODY MASS INDEX: 38.2 KG/M2 | DIASTOLIC BLOOD PRESSURE: 76 MMHG | TEMPERATURE: 98.4 F | SYSTOLIC BLOOD PRESSURE: 122 MMHG | OXYGEN SATURATION: 97 % | HEART RATE: 69 BPM | WEIGHT: 215.6 LBS | HEIGHT: 63 IN

## 2022-06-21 DIAGNOSIS — M79.7 FIBROMYALGIA: ICD-10-CM

## 2022-06-21 DIAGNOSIS — G89.29 CHRONIC MIDLINE LOW BACK PAIN WITHOUT SCIATICA: ICD-10-CM

## 2022-06-21 DIAGNOSIS — M54.50 CHRONIC MIDLINE LOW BACK PAIN WITHOUT SCIATICA: ICD-10-CM

## 2022-06-21 DIAGNOSIS — M50.30 DDD (DEGENERATIVE DISC DISEASE), CERVICAL: ICD-10-CM

## 2022-06-21 DIAGNOSIS — M54.12 CERVICAL RADICULOPATHY: ICD-10-CM

## 2022-06-21 DIAGNOSIS — E66.01 CLASS 2 SEVERE OBESITY DUE TO EXCESS CALORIES WITH SERIOUS COMORBIDITY AND BODY MASS INDEX (BMI) OF 38.0 TO 38.9 IN ADULT (HCC): ICD-10-CM

## 2022-06-21 DIAGNOSIS — F43.22 ADJUSTMENT DISORDER WITH ANXIOUS MOOD: ICD-10-CM

## 2022-06-21 DIAGNOSIS — R07.89 ATYPICAL CHEST PAIN: Primary | ICD-10-CM

## 2022-06-21 PROCEDURE — 1036F TOBACCO NON-USER: CPT | Performed by: FAMILY MEDICINE

## 2022-06-21 PROCEDURE — G8427 DOCREV CUR MEDS BY ELIG CLIN: HCPCS | Performed by: FAMILY MEDICINE

## 2022-06-21 PROCEDURE — 3017F COLORECTAL CA SCREEN DOC REV: CPT | Performed by: FAMILY MEDICINE

## 2022-06-21 PROCEDURE — G8417 CALC BMI ABV UP PARAM F/U: HCPCS | Performed by: FAMILY MEDICINE

## 2022-06-21 PROCEDURE — 99214 OFFICE O/P EST MOD 30 MIN: CPT | Performed by: FAMILY MEDICINE

## 2022-06-21 RX ORDER — GABAPENTIN 100 MG/1
100 CAPSULE ORAL 3 TIMES DAILY
Qty: 270 CAPSULE | Refills: 1 | Status: SHIPPED | OUTPATIENT
Start: 2022-06-21 | End: 2022-07-21 | Stop reason: SDUPTHER

## 2022-06-21 RX ORDER — NAPROXEN 500 MG/1
500 TABLET ORAL 2 TIMES DAILY WITH MEALS
Qty: 180 TABLET | Refills: 1 | Status: SHIPPED | OUTPATIENT
Start: 2022-06-21 | End: 2022-07-21 | Stop reason: SDUPTHER

## 2022-06-21 ASSESSMENT — PATIENT HEALTH QUESTIONNAIRE - PHQ9
6. FEELING BAD ABOUT YOURSELF - OR THAT YOU ARE A FAILURE OR HAVE LET YOURSELF OR YOUR FAMILY DOWN: 0
SUM OF ALL RESPONSES TO PHQ QUESTIONS 1-9: 1
5. POOR APPETITE OR OVEREATING: 0
SUM OF ALL RESPONSES TO PHQ QUESTIONS 1-9: 1
2. FEELING DOWN, DEPRESSED OR HOPELESS: 0
7. TROUBLE CONCENTRATING ON THINGS, SUCH AS READING THE NEWSPAPER OR WATCHING TELEVISION: 0
9. THOUGHTS THAT YOU WOULD BE BETTER OFF DEAD, OR OF HURTING YOURSELF: 0
SUM OF ALL RESPONSES TO PHQ QUESTIONS 1-9: 1
1. LITTLE INTEREST OR PLEASURE IN DOING THINGS: 0
3. TROUBLE FALLING OR STAYING ASLEEP: 0
SUM OF ALL RESPONSES TO PHQ9 QUESTIONS 1 & 2: 0
SUM OF ALL RESPONSES TO PHQ QUESTIONS 1-9: 1
4. FEELING TIRED OR HAVING LITTLE ENERGY: 1
10. IF YOU CHECKED OFF ANY PROBLEMS, HOW DIFFICULT HAVE THESE PROBLEMS MADE IT FOR YOU TO DO YOUR WORK, TAKE CARE OF THINGS AT HOME, OR GET ALONG WITH OTHER PEOPLE: 0
8. MOVING OR SPEAKING SO SLOWLY THAT OTHER PEOPLE COULD HAVE NOTICED. OR THE OPPOSITE, BEING SO FIGETY OR RESTLESS THAT YOU HAVE BEEN MOVING AROUND A LOT MORE THAN USUAL: 0

## 2022-06-21 ASSESSMENT — ENCOUNTER SYMPTOMS
WHEEZING: 0
SHORTNESS OF BREATH: 0
EYE ITCHING: 0
ABDOMINAL PAIN: 0
BACK PAIN: 1
EYE DISCHARGE: 0

## 2022-06-21 NOTE — PROGRESS NOTES
Visit Information    Have you changed or started any medications since your last visit including any over-the-counter medicines, vitamins, or herbal medicines? no   Have you stopped taking any of your medications? Is so, why? -  no  Are you having any side effects from any of your medications? - no    Have you seen any other physician or provider since your last visit? yes -   Have you had any other diagnostic tests since your last visit? yes -   Have you been seen in the emergency room and/or had an admission in a hospital since we last saw you?  yes -   Have you had your routine dental cleaning in the past 6 months?  no     Do you have an active MyChart account? If no, what is the barrier?   Yes    Patient Care Team:  ИВАН Emanuel CNP as PCP - General (Family Medicine)  ИВАН Sandoval CNP as PCP - [facility] Provider  Nelsy Melgoza MD as Consulting Physician (Cardiology)  Idania Mary MD as Consulting Physician (Neurology)  Oh Chandra MD as Consulting Physician (Urology)    Medical History Review  Past Medical, Family, and Social History reviewed and does contribute to the patient presenting condition    Health Maintenance   Topic Date Due    Cervical cancer screen  Never done    Colorectal Cancer Screen  Never done    Shingles vaccine (1 of 2) Never done    Breast cancer screen  10/03/2021    Diabetes screen  04/08/2022    COVID-19 Vaccine (3 - Booster for Cornelious Landsman series) 08/18/2022    Depression Monitoring  12/14/2022    Lipids  02/13/2026    DTaP/Tdap/Td vaccine (3 - Td or Tdap) 01/09/2028    Flu vaccine  Completed    Hepatitis C screen  Completed    HIV screen  Completed    Hepatitis A vaccine  Aged Out    Hepatitis B vaccine  Aged Out    Hib vaccine  Aged Out    Meningococcal (ACWY) vaccine  Aged Out    Pneumococcal 0-64 years Vaccine  Aged Caden

## 2022-06-21 NOTE — PATIENT INSTRUCTIONS
New Updates for [facility] MyChart/ [employer] DELFINO    Thank you for choosing US to give you the best care! [facility] is always trying to think of new ways to help their patients. We are asking all patients to try out the new digital registration that is now available through your [facility] account or the new DELFINO, [employer] ([facility] Via the delfino you're now able to update your personal and registration information prior to your upcoming appointment. This will save you time once you arrive at the office to check-in, not to mention your information remains safe!! Many other perks come from signing up for an account, such as:   Requesting refills   Scheduling an appointment   Completing an [address] Sending a message to the office/provider   Having access to your medication list   Paying your bill/copay prior to your appointment   Scheduling your yearly mammogram   Review your test results    If you are not familiar with [facility] or the [employer] DELFINO, please ask one of us and we will be happy to answer any questions or help you set-up your account. Your [facility] office,  [facility]    Patient Education        Pinched Nerve in the Neck: Care Instructions  Overview  A pinched nerve in the neck happens when a vertebra or disc in the upper part of your spine squeezes a nerve. This can happen because of an injury. Or it canjust happen with age. The changes that happen from an injury or aging may put pressure on a nearby nerve root, pinching it. This causes symptoms such as sharp pain in your neck, shoulder, arm, hand, or back. You may also have tingling or numbness. Sometimes it makes your arm weaker. The symptoms may get worse when you turn your head,cough, or sneeze. For many people, the symptoms get better over time and finally go away. Early treatment usually includes medicines for pain and swelling.  Sometimesphysical therapy and special exercises may help.  Follow-up care is a key part of your treatment and safety. Be sure to make and go to all appointments, and call your doctor if you are having problems. It's also a good idea to know your test results and keep alist of the medicines you take. How can you care for yourself at home?  Be safe with medicines. Read and follow all instructions on the label. ? If the doctor gave you a prescription medicine for pain, take it as prescribed. ? If you are not taking a prescription pain medicine, ask your doctor if you can take an over-the-counter medicine.  Try using a heating pad on a low or medium setting for 15 to 20 minutes every 2 or 3 hours. Try a warm shower in place of one session with the heating pad. You can also buy single-use heat wraps that last up to 8 hours.  You can also try an ice pack for 10 to 15 minutes every 2 to 3 hours. There isn't strong evidence that either heat or ice will help. But you can try them to see if they help you.  Don't spend too long in one position. Take short breaks to move around and change positions.  Wear a seat belt and shoulder harness when you are in a car.  Sleep with a pillow under your head and neck that keeps your neck straight.  If you were given a neck brace (cervical collar) to limit neck motion, wear it as instructed for as many days as your doctor tells you to. Do not wear it longer than you were told to. Wearing a brace for too long can lead to neck stiffness and can weaken the neck muscles.  Follow your doctor's instructions for gentle neck-stretching exercises.  Do not smoke. Smoking can slow healing of your discs. If you need help quitting, talk to your doctor about stop-smoking programs and medicines. These can increase your chances of quitting for good.  Avoid activities that may make your symptoms worse. Ask your doctor when you can start doing those activities again. When should you call for help?    Call 911 anytime you think you may need emergency care. For example, call if:     You are unable to move an arm or a leg at all. Call your doctor now or seek immediate medical care if:     You have new or worse symptoms in your arms, legs, chest, belly, or buttocks. Symptoms may include:  ? Numbness or tingling. ? Weakness. ? Pain.      You lose bladder or bowel control. Watch closely for changes in your health, and be sure to contact your doctor if:     You are not getting better as expected. Where can you learn more? Go to https://[employer]. org and sign in to your [employer] account. Enter D846 in the [employer] box to learn more about \"Pinched Nerve in the Neck: Care Instructions. \"     If you do not have an account, please click on the \"Sign Up Now\" link. Current as of: March 9, 2022               Content Version: 13.3  © 7629-4812 Health[employer], [employer]. Care instructions adapted under license by [facility] ([facility]). If you have questions about a medical condition or this instruction, always ask your healthcare professional. [place] [address] any warranty or liability for your use of this information.

## 2022-06-21 NOTE — LETTER
CONTROLLED SUBSTANCE MEDICATION AGREEMENT     Patient Name: Claire Beverly  Patient YOB: 1970   I understand, that controlled substance medications may be used to help better manage my symptoms and to improve my ability to function at home, work and in social settings. However, I also understand that these medications do have risks, which have been discussed with me, including possible development of physical or psychological dependence. I understand that successful treatment requires mutual trust and honesty between me and my provider. I understand and agree that following this Medication Agreement is necessary in continuing my provider-patient relationship and the success of my treatment plan. Explanation from my Provider: Benefits and Goals of Controlled Substance Medications: There are two potential goals for your treatment: (1) decreased pain and suffering (2) improved daily life functions. There are many possible treatments for your chronic condition(s). Alternatives such as physical therapy, yoga, massage, home daily exercise, meditation, relaxation techniques, injections, chiropractic manipulations, surgery, cognitive therapy, hypnosis and many medications that are not habit-forming may be used. Use of controlled substance medications may be helpful, but they are unlikely to resolve all symptoms or restore all function. Explanation from my Provider: Risks of Controlled Substance Medications:  Opioid pain medications: These medications can lead to problems such as addiction/dependence, sedation, lightheadedness/dizziness, memory issues, falls, constipation, nausea, or vomiting. They may also impair the ability to drive or operate machinery. Additionally, these medications may lower testosterone levels, leading to loss of bone strength, stamina and sex drive.   They may cause problems with breathing, sleep apnea and reduced coughing, which is especially dangerous for patients with lung disease. Overdose or dangerous interactions with alcohol and other medications may occur, leading to death. Hyperalgesia may develop, which means patients receiving opioids for the treatment of pain may become more sensitive to certain painful stimuli, and in some cases, experience pain from ordinarily non-painful stimuli. Women between the ages of 14-53 who could become pregnant should carefully weigh the risks and benefits of opioids with their physicians, as these medications increase the risk of pregnancy complications, including miscarriage,  delivery and stillbirth. It is also possible for babies to be born addicted to opioids. Opioid dependence withdrawal symptoms may include; feelings of uneasiness, increased pain, irritability, belly pain, diarrhea, sweats and goose-flesh. Benzodiazepines and non-benzodiazepine sleep medications: These medications can lead to problems such as addiction/dependence, sedation, fatigue, lightheadedness, dizziness, incoordination, falls, depression, hallucinations, and impaired judgment, memory and concentration. The ability to drive and operate machinery may also be affected. Abnormal sleep-related behaviors have been reported, including sleepwalking, driving, making telephone calls, eating, or having sex while not fully awake. These medications can suppress breathing and worsen sleep apnea, particularly when combined with alcohol or other sedating medications, potentially leading to death. Dependence withdrawal symptoms may include tremors, anxiety, hallucinations and seizures. Stimulants:  Common adverse effects include addiction/dependence, increased blood  pressure and heart rate, decreased appetite, nausea, involuntary weight loss, insomnia,                                                                                                                     Initials:_______   irritability, and headaches.   These risks may increase when these medications are combined with other stimulants, such as caffeine pills or energy drinks, certain weight loss supplements and oral decongestants. Dependence withdrawal symptoms may include depressed mood, loss of interest, suicidal thoughts, anxiety, fatigue, appetite changes and agitation. Testosterone replacement therapy:  Potential side effects include increased risk of stroke and heart attack, blood clots, increased blood pressure, increased cholesterol, enlarged prostate, sleep apnea, irritability/aggression and other mood disorders, and decreased fertility. I agree and understand that I and my prescriber have the following rights and responsibilities regarding my treatment plan:     1. MY RIGHTS:  To be informed of my treatment and medication plan. To be an active participant in my health and wellbeing. 2. MY RESPONSIBILITY AND UNDERSTANDING FOR USE OF MEDICATIONS   I will take medications at the dose and frequency as directed. For my safety, I will not increase or change how I take my medications without the recommendation of my healthcare provider.  I will actively participate in any program recommended by my provider which may improve function, including social, physical, psychological programs.  I will not take my medications with alcohol or other drugs not prescribed to me. I understand that drinking alcohol with my medications increases the chances of side effects, including reduced breathing rate and could lead to personal injury when operating machinery.  I understand that if I have a history of substance use disorders, including alcohol or other illicit drugs, that I may be at increased risk of addiction to my medications.  I agree to notify my provider immediately if I should become pregnant so that my treatment plan can be adjusted.    I agree and understand that I shall only receive controlled substance medications from the prescriber that signed this agreement unless there is written agreement among other prescribers of controlled substances outlining the responsibility of the medications being prescribed.  I understand that the if the controlled medication is not helping to achieve goals, the dosage may be tapered and no longer prescribed. 3. MY RESPONSIBILITY FOR COMMUNICATION / PRESCRIPTION RENEWALS   I agree that all controlled substance medications that I take will be prescribed only by my provider. If another healthcare provider prescribes me medication in an emergency, I will notify my provider within seventy-two (72) hours.  I will arrange for refills at the prescribed interval ONLY during regular office hours. I will not ask for refills earlier than agreed, after-hours, on holidays or weekends. Refills may take up to 72 hours for processing and prescriptions to reach the pharmacy.  I will inform my other health care providers that I am taking these medications and of the existence of this [id number]. In the event of an emergency, I will provide the same information to the emergency department prescribers.  I will keep my provider updated on the pharmacy I am using for controlled medication prescription filling. Initials:_______  4. MY RESPONSIBILITY FOR PROTECTING MEDICATIONS   I will protect my prescriptions and medications. I understand that lost or misplaced prescriptions will not be replaced.  I will keep medications only for my own use and will not share them with others. I will keep all medications away from children.  I agree that if my medications are adjusted or discontinued, I will properly dispose of any remaining medications. I understand that I will be required to dispose of any remaining controlled medications as, directed by my prescriber, prior to being provided with any prescriptions for other controlled medications.   Medication drop box locations can be found at: [email or web address]    5. MY RESPONSIBILITY WITH ILLEGAL DRUGS    I will not use illegal or street drugs or another person's prescription medications not prescribed to me.  If there are identified addiction type symptoms, then referral to a program may be provided by my provider and I agree to follow through with this recommendation. 6. MY RESPONSIBILITY FOR COOPERATION WITH INVESTIGATIONS   I understand that my provider will comply with any applicable law and may discuss my use and/or possible misuse/abuse of controlled substances and alcohol, as appropriate, with any health care provider involved in my care, pharmacist, or legal authority.  I authorize my provider and pharmacy to cooperate fully with law enforcement agencies (as permitted by law) in the investigation of any possible misuse, sale, or other diversion of my controlled substances.  I agree to waive any applicable privilege or right of privacy or confidentiality with respect to these authorizations. 7. PROVIDERS RIGHT TO MONITOR FOR SAFETY: PRESCRIPTION MONITORING / DRUG TESTING   I consent to drug/toxicology screening and will submit to a drug screen upon my providers request to assure I am only taking the prescribed drugs for my safety monitoring. I understand that a drug screen is a laboratory test in which a sample of my urine, blood or saliva is checked to see what drugs I have been taking. This may entail an observed urine specimen, which means that a nurse or other health care provider may watch me provide urine, and I will cooperate if I am asked to provide an observed specimen.  I understand that my provider will check a copy of my State Prescription Monitoring Program () Report in order to safely prescribe medications.  Pill Counts: I consent to pill counts when requested.   I may be asked to bring all my prescribed controlled substance medications, in their original bottles, to all of my scheduled appointments. In addition, my provider may ask me to come to the practice at any time for a random pill count. 8. TERMINATION OF THIS AGREEMENT  For my safety, my prescriber has the right to stop prescribing controlled substance medications and may end this agreement. Initials:_______   Conditions that may result in termination of this agreement:  a. I do not show any improvement in pain, or my activity has not improved. b. I develop rapid tolerance or loss of improvement, as described in my treatment plan.  c. I develop significant side effects from the medication. d. My behavior is not consistent with the responsibilities outlined above, thereby causing safety concerns to continue prescribing controlled substance medications. e. I fail to follow the terms of this agreement. f. Other:____________________________       UNDERSTANDING THIS MEDICATION AGREEMENT:    I have read the above and have had all my questions answered. For chronic disease management, I know that my symptoms can be managed with many types of treatments. A chronic medication trial may be part of my treatment, but I must be an active participant in my care. Medication therapy is only one part of my symptom management plan. In some cases, there may be limited scientific evidence to support the chronic use of certain medications to improve symptoms and daily function. Furthermore, in certain circumstances, there may be scientific information that suggests that the use of chronic controlled substances may worsen my symptoms and increase my risk of unintentional death directly related to this medication therapy. I know that if my provider feels my risk from controlled medications is greater than my benefit, I will have my controlled substance medication(s) compassionately lowered or removed altogether.      I further agree to allow this office to contact my HIPAA contact if there are concerns about my safety and use of the controlled medications. I have agreed to use the prescribed controlled substance medications to me as instructed by my provider and as stated in this Medication Agreement. My initial on each page and my signature below shows that I have read each page and I have had the opportunity to ask questions with answers provided by my provider.     Patient Name (Printed): _____________________________________  Patient Signature:  ______________________   Date: _____________    Prescriber Name (Printed): ___________________________________  Prescriber Signature: _____________________  Date: _____________

## 2022-06-30 ENCOUNTER — HOSPITAL ENCOUNTER (OUTPATIENT)
Dept: MRI IMAGING | Age: 52
Discharge: HOME | End: 2022-07-02
Payer: MEDICARE

## 2022-06-30 DIAGNOSIS — M54.12 CERVICAL RADICULOPATHY: ICD-10-CM

## 2022-06-30 DIAGNOSIS — M50.30 DDD (DEGENERATIVE DISC DISEASE), CERVICAL: ICD-10-CM

## 2022-06-30 PROCEDURE — 72141 MRI NECK SPINE W/O DYE: CPT

## 2022-07-14 ENCOUNTER — TELEPHONE (OUTPATIENT)
Dept: FAMILY MEDICINE CLINIC | Age: 52
End: 2022-07-14

## 2022-07-14 DIAGNOSIS — M54.12 CERVICAL RADICULOPATHY: Primary | ICD-10-CM

## 2022-07-14 RX ORDER — PREDNISONE 10 MG/1
TABLET ORAL
Qty: 30 TABLET | Refills: 0 | Status: SHIPPED | OUTPATIENT
Start: 2022-07-14 | End: 2022-08-04 | Stop reason: SDUPTHER

## 2022-07-14 NOTE — TELEPHONE ENCOUNTER
Patient wanted to follow up from her last visit with you regarding her L arm pain, neck pain and shoulder pain. She was started on  gabapentin and Naproxen and she tolerates those medications but things havent gotten any better and was told to call back if things arent getting any better. She would like to see if you can now send in a script for the steroid. Please advise. Neurosurgeon: 08/17-will see as a new patient.      RX: [facility] on [address]

## 2022-07-21 ENCOUNTER — TELEPHONE (OUTPATIENT)
Dept: FAMILY MEDICINE CLINIC | Age: 52
End: 2022-07-21

## 2022-07-21 DIAGNOSIS — M50.30 DDD (DEGENERATIVE DISC DISEASE), CERVICAL: ICD-10-CM

## 2022-07-21 DIAGNOSIS — M54.12 CERVICAL RADICULOPATHY: ICD-10-CM

## 2022-07-21 DIAGNOSIS — M79.7 FIBROMYALGIA: ICD-10-CM

## 2022-07-21 RX ORDER — TIZANIDINE 2 MG/1
2 TABLET ORAL NIGHTLY PRN
Qty: 30 TABLET | Refills: 1 | Status: SHIPPED | OUTPATIENT
Start: 2022-07-21

## 2022-07-21 RX ORDER — GABAPENTIN 100 MG/1
200 CAPSULE ORAL 3 TIMES DAILY
Qty: 180 CAPSULE | Refills: 0 | Status: SHIPPED | OUTPATIENT
Start: 2022-07-21 | End: 2022-08-04 | Stop reason: SDUPTHER

## 2022-07-21 RX ORDER — NAPROXEN 500 MG/1
500 TABLET ORAL 2 TIMES DAILY WITH MEALS
Qty: 180 TABLET | Refills: 1 | Status: SHIPPED | OUTPATIENT
Start: 2022-07-21

## 2022-07-21 NOTE — TELEPHONE ENCOUNTER
PATIENT CALLED STATING HER BACK PAIN IS NOT ANY BETTER STATES SHE IS TAKING THE MEDICATIONS AS PRESCRIBED BUT NO HELP.  SHE IS ASKING WHAT ELSE SHE SHOULD DO FOR THE PAIN PLEASE ADVISE

## 2022-07-22 NOTE — TELEPHONE ENCOUNTER
Called pt and gave her message per liz, in regard to her pain in her neck , back and shoulder. She stated she did have an appt scheduled with neurosurgeon on 08/17, I also did advise her to call to see if she can be seen sooner.     Pt had no further questions and verbalized understanding

## 2022-08-02 NOTE — TELEPHONE ENCOUNTER
Patient called and stated that she has appt on 08/17/22 with the neurosurgeon. Will attempt to try again to get in sooner. Also stated that the Shoulder, neck, and back pain is radiating to her whole left side. 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 It is radiating to the front of her neck. As she describes it is a feeling as she is being choked. Will like a pain medication called in to help with her pain until she sees the neurosurgeon. Please Approve or Refuse.   Send to Pharmacy per Pt's Request: jeanette     Next Visit Date:  9/23/2022   Last Visit Date: 6/21/2022    Hemoglobin A1C (%)   Date Value   04/08/2019 5.6             ( goal A1C is < 7)   BP Readings from Last 3 Encounters:   06/21/22 122/76   06/14/22 (!) 142/49   12/14/21 120/84          (goal 120/80)  BUN   Date Value Ref Range Status   08/23/2021 13 6 - 20 mg/dL Final     Creatinine   Date Value Ref Range Status   08/23/2021 0.87 0.50 - 0.90 mg/dL Final     Potassium   Date Value Ref Range Status   08/23/2021 4.8 3.7 - 5.3 mmol/L Final

## 2022-08-03 ENCOUNTER — HOSPITAL ENCOUNTER (EMERGENCY)
Age: 52
Discharge: HOME | End: 2022-08-03
Attending: EMERGENCY MEDICINE
Payer: MEDICARE

## 2022-08-03 VITALS
HEART RATE: 85 BPM | SYSTOLIC BLOOD PRESSURE: 139 MMHG | RESPIRATION RATE: 18 BRPM | HEIGHT: 63 IN | OXYGEN SATURATION: 97 % | WEIGHT: 219 LBS | TEMPERATURE: 98.3 F | BODY MASS INDEX: 38.8 KG/M2 | DIASTOLIC BLOOD PRESSURE: 97 MMHG

## 2022-08-03 DIAGNOSIS — M54.12 CERVICAL RADICULOPATHY: Primary | ICD-10-CM

## 2022-08-03 PROCEDURE — 99283 EMERGENCY DEPT VISIT LOW MDM: CPT

## 2022-08-03 RX ORDER — HYDROCODONE BITARTRATE AND ACETAMINOPHEN 5; 325 MG/1; MG/1
1 TABLET ORAL EVERY 6 HOURS PRN
Qty: 12 TABLET | Refills: 0 | Status: SHIPPED | OUTPATIENT
Start: 2022-08-03 | End: 2022-08-06

## 2022-08-03 ASSESSMENT — PAIN DESCRIPTION - LOCATION: LOCATION: NECK

## 2022-08-03 ASSESSMENT — PAIN DESCRIPTION - PAIN TYPE: TYPE: CHRONIC PAIN

## 2022-08-03 ASSESSMENT — PAIN - FUNCTIONAL ASSESSMENT: PAIN_FUNCTIONAL_ASSESSMENT: 0-10

## 2022-08-03 ASSESSMENT — PAIN SCALES - GENERAL: PAINLEVEL_OUTOF10: 10

## 2022-08-03 NOTE — PROGRESS NOTES
[facility] [address] [place] [address] [place] [address]   O: [phone]  Jeanetta Pallas is a 46 y.o. female evaluated via telephone on 8/4/2022. CONSENT:  She and/or health care decision maker is aware that that she may receive a bill for this telephone service, depending on her insurance coverage, and has provided verbal consent to proceed: Yes    DOCUMENTATION:  Patient scheduled this appointment today due to Neck Pain (Popping sound, pain increasing very painful), Spasms (Left side of body onset for a few weeks and is worseninf ), Telephone Appointment Visit, and Discuss Medications  . Drug screen not done    CERVICAL RADICULOPATHY/LUMBAR RADICULOPATHY/ FIBROMYALGIA  Patient states that her pain is \"unbearable and intense\"  She went to the ER yesterday was given some medication/ Norco. A few days. She has an appointment with the neuro surgeon sooner. Pain radiation  on her Left jaw and neck. 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 She is now working at the [employer] now. 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 She missed some work. She felt some cracking noises on her cervical area after bending. Posterior muscles stiffening and left body. No falls, no weakness. No loss of bowel, bladder control  Future Appointments   Date Time Provider Derek Rosas   8/12/2022  9:30 AM ИВАН Koch CNP [facility]   9/23/2022  1:30 PM ИВАН Van CNP [facility]      Patient have always had some chronic neck and back pain so we struggled with this. She has had an old MRI in 2019 and was sent to the specialist but never really followed through. She was seen by the neurologist but did not necessarily continue with the treatment. Patient is an established patient of  Dr Radha Morris, Wants to transfer to another provider. Will refer to another provider. Was taking ibuprofen. With very minimal relief.   She does complain of some numbness on both arms  She just started working as a  at [employer] she had been doing a lot of mopping/repetitive motion. She has been working long hours. She has noticed some worsening of her symptoms ever since starting this new job. Patient referred to Neuro surgeon. Appointment on 8/12  Struggled pain relief. NEW MRI RESULT       Impression   Since prior MRI, worsening canal and foraminal stenosis at C6-C7. At C3-C4, grade 1 retrolisthesis and central disc protrusion. At C6-C7, moderate canal stenosis and severe bilateral neural foraminal   narrowing       At C7-T1, right paracentral disc protrusion. cervical MRI 2019       Impression   Multilevel degenerative disc disease with associated uncovertebral and facet   hypertrophy. Moderate right and severe left foraminal narrowing at C6-7. DEPRESSION SCREENING: Negative  PHQ Scores 6/21/2022 12/14/2021 10/11/2021 3/12/2021 9/15/2020 11/7/2018 1/27/2015   PHQ2 Score 0 0 0 0 0 6 6   PHQ9 Score 1 0 0 0 0 15 19     I communicated with the patient and/or health care decision maker about: PLAN     Review of Systems   Constitutional:  Positive for activity change. Negative for chills and fever. Eyes:  Negative for discharge and itching. Respiratory:  Negative for shortness of breath and wheezing. Cardiovascular:  Positive for chest pain. Negative for palpitations. Gastrointestinal:  Negative for abdominal pain. Genitourinary:  Negative for dysuria. Musculoskeletal:  Positive for arthralgias, back pain, gait problem, joint swelling and neck pain. Neurological:  Positive for numbness. Negative for weakness and headaches. Psychiatric/Behavioral:  Negative for dysphoric mood, sleep disturbance and suicidal ideas. The patient is nervous/anxious. Details of this discussion including any medical advice provided: Under assessment.     PHYSICAL EXAM[INSTRUCTIONS:  \"[x]\" Indicates a positive item sciatica  Failure to Improve  Continue current therapy. DISCUSSED AND ADVISED TO:  Use heat packs 15 to 20 mins every 2-3 hours. Do some back stretches as tolerated. Refer to hand out for instructions. Call for worsening, numbness, weakness. 4. DDD (degenerative disc disease), cervical  Worsening  Continue current therapy. DISCUSSED and ADVISED TO:  Stay at a healthy weight. Continue exercises/PT  Stretch to help prevent stiffness and to prevent injury before exercise. Gentle forms of yoga help keep joints and muscles flexible. Walk instead of jog, ride a bike, swim, and water exercise. Lift weights as tolerated. strong muscles help reduce stress on joints. Take pain medicines exactly as directed and only as needed. - gabapentin (NEURONTIN) 300 MG capsule; Take 1 capsule by mouth in the morning and 1 capsule at noon and 1 capsule before bedtime. Do all this for 30 days. Intended supply: 90 days. Dispense: 90 capsule; Refill: 0    5. Adjustment disorder with anxious mood  Failure to Improve  Continue current therapy. Discussed how to recognize anxiety. Advised to relieve tension with exercise or a massage. Advised to get enough rest.  Advised to avoid alcohol, caffeine, nicotine, and illegal drugs. Which can increase anxiety level and cause sleep problems. 6. Class 2 severe obesity due to excess calories with serious comorbidity and body mass index (BMI) of 38.0 to 38.9 in adult (HCC)  Failure to Improve  BMI stable  DISCUSSED AND ADVISED TO:  Eat a low-fat and low carbohydrates diet. Avoid fried foods especially fast food. Choose healthier options for snacks. Have 5-6 servings of fruits and vegetables per day. Cut down on eating processed food. Add 30 minutes to 1 hour aerobic exercise for 3-4 days a week.     On this date 8/4/2022 I have spent 35 minutes reviewing previous notes, test results and face to face with the patient discussing the diagnosis and importance of compliance with the treatment plan as well as documenting on the day of the visit. Amrita Temple is a 46 y.o. female patient  being evaluated by through a synchronous (real-time) audio-video encounter . The patient (or guardian if applicable) is aware that this is a billable service, which includes applicable co-pays. This Virtual Visit was conducted withpatient's (and/or legal guardian's) consent. The visit was conducted pursuant tot he emergency declaration under the [address] [place] [address], [facility] waiver authority and the [employer] and [employer] General Act. Patient identification was verified,and a caregiver was present when appropriate. The patient was located in a state where the provider was licensed to provide care. This note was completed by using the assistance of a speech-recognition program. However, inadvertent computerized transcription errors may be present. Although every effort was made to ensure accuracy, no guarantees can be provided that every mistake has been identified and corrected by editing.   Electronically signed by ИВАН Torres CNP on 8/3/22 at 6:20 PM EDT

## 2022-08-03 NOTE — PROGRESS NOTES
Visit Information    Have you changed or started any medications since your last visit including any over-the-counter medicines, vitamins, or herbal medicines? yes - immune support gummies , multi vitamin  Have you stopped taking any of your medications? Is so, why? -  no  Are you having any side effects from any of your medications? - no    Have you seen any other physician or provider since your last visit?  no   Have you had any other diagnostic tests since your last visit? yes - MRI NECK   Have you been seen in the emergency room and/or had an admission in a hospital since we last saw you?  no   Have you had your routine dental cleaning in the past 6 months?  no     Do you have an active MyChart account? If no, what is the barrier?   No: WILL NEED NEW PW     Patient Care Team:  ИВАН Van CNP as PCP - General (Family Medicine)  [facility]  Valentín Urbina MD as Consulting Physician (Cardiology)  Sera Mukherjee MD as Consulting Physician (Neurology)  Ej Abernathy MD as Consulting Physician (Urology)    Medical History Review  Past Medical, Family, and Social History reviewed and does contribute to the patient presenting condition    Health Maintenance   Topic Date Due    Cervical cancer screen  Never done    Colorectal Cancer Screen  Never done    Shingles vaccine (1 of 2) Never done    Breast cancer screen  10/03/2021    Diabetes screen  04/08/2022    COVID-19 Vaccine (3 - Booster for Moderna series) 08/18/2022    Flu vaccine (1) 09/01/2022    Depression Monitoring  06/21/2023    Lipids  02/13/2026    DTaP/Tdap/Td vaccine (3 - Td or Tdap) 01/09/2028    Hepatitis C screen  Completed    HIV screen  Completed    Hepatitis A vaccine  Aged Out    Hepatitis B vaccine  Aged Out    Hib vaccine  Aged Out    Meningococcal (ACWY) vaccine  Aged Out    Pneumococcal 0-64 years Vaccine  Aged Out

## 2022-08-03 NOTE — DISCHARGE INSTRUCTIONS
Continue medications as directed. Norco as needed for pain. Return for weakness, numbness, or if worse in any way. Please understand that at this time there is no evidence for a more serious underlying process, but that early in the process of an illness or injury, an emergency department workup can be falsely reassuring. You should contact your family doctor within the next 48 hours for a follow up appointment    Zeb Manzo!!!    From [facility] ([facility]) and [address] [place] [address] Emergency Services    On behalf of the Emergency Department staff at [facility]), I would like to thank you for giving us the opportunity to address your health care needs and concerns. We hope that during your visit, our service was delivered in a professional and caring manner. Please keep [facility] ([facility]) in mind as we walk with you down the path to your own personal wellness. Please expect an automated text message or email from us so we can ask a few questions about your health and progress. Based on your answers, a clinician may call you back to offer help and instructions. Please understand that early in the process of an illness or injury, an emergency department workup can be falsely reassuring. If you notice any worsening, changing or persistent symptoms please call your family doctor or return to the ER immediately. Tell us how we did during your visit at [email or web address]   and let us know about your experience

## 2022-08-03 NOTE — LETTER
[id number]  [address] [facility] [address]  Phone: [phone]  Fax: [phone]               August 3, 2022    Patient: Singh Amanda   YOB: 1970   Date of Visit: 8/3/2022       To Whom It May Concern:    Aldo Sullivan was seen and treated in our emergency department on 8/3/2022. She may return to work on 08/06/22.       Sincerely,       Ben Good MD         Signature:__________________________________

## 2022-08-03 NOTE — ED PROVIDER NOTES
[address] Name: Tuyet Hanley  MRN: [id number]  [place] 1970  Date of evaluation: 8/3/2022      CHIEF COMPLAINT       Chief Complaint   Patient presents with    Back Pain         HISTORY OF PRESENT ILLNESS      The patient presents with pain in her neck. The patient recently had an MRI demonstrating degenerative change including grade 1 retrolisthesis of C3-4 with central disc protrusion, canal stenosis and bilateral neuroforaminal narrowing from C6 and C7, and right paracentral disc protrusion at C7-T1. Patient feels like she may have exacerbated it recently at work. She is not having any weakness however. She says she just has worse pain. The pain radiates from the left side of her neck down her arm and sometimes to her chest wall on the left side. She denies back pain. She denies fever. She says she is on muscle relaxers and gabapentin but the pain is still too severe. She has not had a fever. She denies new trauma. REVIEW OF SYSTEMS       All systems reviewed and negative unless noted in HPI. The patient denies fever or constitutional symptoms. Denies vision change. Denies any sore throat or rhinorrhea. Neck pain as noted in HPI. Denies chest pain or shortness of breath. No nausea,  vomiting or diarrhea. Denies recent musculoskeletal injury. Denies any weakness, numbness or focal neurologic deficit. Denies any skin rash or edema. No recent psychiatric issues. No easy bruising or bleeding. Denies any polyuria, polydypsia or history of immunocompromise. PAST MEDICAL HISTORY    has a past medical history of Anxiety, CAD (coronary artery disease), Chronic back pain, Depression, Fatty liver, Fibromyalgia, Headache(784.0), High cholesterol, and Palpitations. SURGICAL HISTORY      has a past surgical history that includes Appendectomy;  Tubal ligation; Spine surgery; and Cystoscopy (N/A, 2019). CURRENT MEDICATIONS       Previous Medications    DICLOFENAC SODIUM (VOLTAREN) 1 % GEL    APPLY 2 GRAMS TOPICALLY EVERY 6 HOURS AS NEEDED FOR PAIN AND SWELLING IN JOINT    GABAPENTIN (NEURONTIN) 100 MG CAPSULE    Take 2 capsules by mouth in the morning and 2 capsules at noon and 2 capsules before bedtime. Do all this for 30 days. Intended supply: 90 days. LIDOCAINE (LMX) 4 % CREAM    Apply 2-3 times a day as needed for pain    MAGNESIUM-OXIDE 400 (241.3 MG) MG TABS TABLET    TAKE 1 TABLET BY MOUTH DAILY    MELATONIN 1 MG CHEW    Take by mouth    MULTIPLE VITAMINS-MINERALS (IMMUNE SUPPORT PO)    Take by mouth daily    NAPROXEN (NAPROSYN) 500 MG TABLET    Take 1 tablet by mouth in the morning and 1 tablet in the evening. Take with meals. OMEGA-3 FATTY ACIDS (FISH OIL PO)    Take by mouth daily     OXYBUTYNIN (DITROPAN-XL) 10 MG EXTENDED RELEASE TABLET    TAKE 1 TABLET BY MOUTH DAILY    PREDNISONE (DELTASONE) 10 MG TABLET    Take 4 tabs X 3 days, then 3 tabs X 3 days, then 2 tabs X 3 days, then 1 tab X 3 days    SERTRALINE (ZOLOFT) 50 MG TABLET    Take 1 tablet by mouth daily    TIZANIDINE (ZANAFLEX) 2 MG TABLET    Take 1 tablet by mouth nightly as needed (stiffness)    VITAMIN D (CHOLECALCIFEROL) 25 MCG (1000 UT) TABS TABLET    Take 1 tablet by mouth daily    VITAMIN E/D-ALPHA NATURAL 268 MG (400 UNIT) CAPS    TAKE 1 CAPSULE BY MOUTH DAILY       ALLERGIES     is allergic to effexor [venlafaxine hcl], morphine, morphine sulfate, pcn [penicillins], and penicillin g. FAMILY HISTORY     She indicated that her mother is . She indicated that her father is . She indicated that both of her sisters are alive. She indicated that both of her brothers are alive. family history includes Alcohol Abuse in her father; Breast Cancer in her sister; Cancer in her mother; Cervical Cancer in her sister; Coronary Art Dis in her sister; Diabetes in her mother.     SOCIAL HISTORY      reports that she has never smoked. She has never used smokeless tobacco. She reports current alcohol use. She reports that she does not use drugs. PHYSICAL EXAM     INITIAL VITALS:  height is 5' 3\" (1.6 m) and weight is 99.3 kg (219 lb). Her oral temperature is 98.3 °F (36.8 °C). Her blood pressure is 139/97 (abnormal) and her pulse is 85. Her respiration is 18 and oxygen saturation is 97%. The patient is alert and oriented, in distress secondary to pain. HEENT is atraumatic. Pupils are PERRL at 4 mm. Mucous membranes moist.    Neck is supple with no pain to palpation in the midline of the cervical spine. Mild discomfort in the left parotid spinous musculature. Heart sounds regular rate and rhythm with no gallops, murmurs, or rubs. Lungs clear, no wheezes, rales or rhonchi. Abdomen: soft, nontender with no pain to palpation. Musculoskeletal exam: No pain in the midline of the cervical, thoracic, lumbar spine. Mild pain in the left paraspinous musculature. Plus 5 out of 5 gross motor strength in the upper and lower extremities. Skin: no rash or edema. Neurological exam reveals cranial nerves 2 through 12 grossly intact. Patient has equal  and normal deep tendon reflexes. No sensory loss in the upper or lower extremities. Psychiatric: no hallucinations or suicidal ideation. Lymphatics.:  No lymphadenopathy. DIFFERENTIAL DIAGNOSIS/ MDM:     Radicular pain, cord compression, dural abscess    DIAGNOSTIC RESULTS         EMERGENCY DEPARTMENT COURSE:   Vitals:    Vitals:    08/03/22 1828   BP: (!) 139/97   Pulse: 85   Resp: 18   Temp: 98.3 °F (36.8 °C)   TempSrc: Oral   SpO2: 97%   Weight: 99.3 kg (219 lb)   Height: 5' 3\" (1.6 m)     -------------------------  BP: (!) 139/97, Temp: 98.3 °F (36.8 °C), Heart Rate: 85, Resp: 18      Re-evaluation Notes    The patient has no new weakness or numbness. She has no motor deficit. She does have worsening pain. I will write for pain medicine.   I do not think that imaging is warranted at this time in light of the recent MRI. The patient will be asked to follow-up with her surgeon as soon as possible. She can also talk to her physician for worse pain. She is discharged in good condition. Controlled Substance Monitoring:    Acute and Chronic Pain Monitoring:   RX Monitoring 3/12/2021   Attestation -   Periodic Controlled Substance Monitoring Possible medication side effects, risk of tolerance/dependence & alternative treatments discussed. ;No signs of potential drug abuse or diversion identified. ;Assessed functional status. FINAL IMPRESSION      1. Cervical radiculopathy          DISPOSITION/PLAN   DISPOSITION Decision To Discharge 08/03/2022 06:35:01 PM      Condition on Disposition    good    PATIENT REFERRED TO:  your spine surgeon      as scheduled      DISCHARGE MEDICATIONS:  New Prescriptions    HYDROCODONE-ACETAMINOPHEN (NORCO) 5-325 MG PER TABLET    Take 1 tablet by mouth every 6 hours as needed for Pain for up to 3 days. Intended supply: 3 days.  Take lowest dose possible to manage pain       (Please note that portions of this note were completed with a voice recognition program.  Efforts were made to edit the dictations but occasionally words are mis-transcribed.)    Juana Lazo MD,, MD   Attending Emergency Physician          Arnol Nolan MD  08/03/22 0713

## 2022-08-04 ENCOUNTER — TELEMEDICINE (OUTPATIENT)
Dept: FAMILY MEDICINE CLINIC | Age: 52
End: 2022-08-04
Payer: MEDICARE

## 2022-08-04 DIAGNOSIS — F43.22 ADJUSTMENT DISORDER WITH ANXIOUS MOOD: ICD-10-CM

## 2022-08-04 DIAGNOSIS — M50.30 DDD (DEGENERATIVE DISC DISEASE), CERVICAL: ICD-10-CM

## 2022-08-04 DIAGNOSIS — G89.29 CHRONIC MIDLINE LOW BACK PAIN WITHOUT SCIATICA: ICD-10-CM

## 2022-08-04 DIAGNOSIS — M54.12 CERVICAL RADICULOPATHY: Primary | ICD-10-CM

## 2022-08-04 DIAGNOSIS — M54.50 CHRONIC MIDLINE LOW BACK PAIN WITHOUT SCIATICA: ICD-10-CM

## 2022-08-04 DIAGNOSIS — E66.01 CLASS 2 SEVERE OBESITY DUE TO EXCESS CALORIES WITH SERIOUS COMORBIDITY AND BODY MASS INDEX (BMI) OF 38.0 TO 38.9 IN ADULT (HCC): ICD-10-CM

## 2022-08-04 DIAGNOSIS — M79.7 FIBROMYALGIA: ICD-10-CM

## 2022-08-04 PROCEDURE — 3017F COLORECTAL CA SCREEN DOC REV: CPT | Performed by: FAMILY MEDICINE

## 2022-08-04 PROCEDURE — 99214 OFFICE O/P EST MOD 30 MIN: CPT | Performed by: FAMILY MEDICINE

## 2022-08-04 PROCEDURE — G8427 DOCREV CUR MEDS BY ELIG CLIN: HCPCS | Performed by: FAMILY MEDICINE

## 2022-08-04 RX ORDER — GABAPENTIN 300 MG/1
300 CAPSULE ORAL 3 TIMES DAILY
Qty: 90 CAPSULE | Refills: 0 | Status: SHIPPED | OUTPATIENT
Start: 2022-08-04 | End: 2022-09-07

## 2022-08-04 RX ORDER — PREDNISONE 10 MG/1
TABLET ORAL
Qty: 30 TABLET | Refills: 0 | Status: SHIPPED | OUTPATIENT
Start: 2022-08-04 | End: 2022-08-04

## 2022-08-04 RX ORDER — PREDNISONE 10 MG/1
TABLET ORAL
Qty: 30 TABLET | Refills: 0 | Status: SHIPPED | OUTPATIENT
Start: 2022-08-04

## 2022-08-04 RX ORDER — GABAPENTIN 300 MG/1
300 CAPSULE ORAL 3 TIMES DAILY
Qty: 90 CAPSULE | Refills: 0 | Status: SHIPPED | OUTPATIENT
Start: 2022-08-04 | End: 2022-08-04

## 2022-08-04 ASSESSMENT — ENCOUNTER SYMPTOMS
EYE ITCHING: 0
BACK PAIN: 1
WHEEZING: 0
SHORTNESS OF BREATH: 0
ABDOMINAL PAIN: 0
EYE DISCHARGE: 0

## 2022-08-10 DIAGNOSIS — F43.22 ADJUSTMENT DISORDER WITH ANXIOUS MOOD: ICD-10-CM

## 2022-08-12 ENCOUNTER — HOSPITAL ENCOUNTER (OUTPATIENT)
Age: 52
Discharge: HOME | End: 2022-08-14
Payer: MEDICARE

## 2022-08-12 ENCOUNTER — OFFICE VISIT (OUTPATIENT)
Dept: NEUROSURGERY | Age: 52
End: 2022-08-12
Payer: MEDICARE

## 2022-08-12 ENCOUNTER — HOSPITAL ENCOUNTER (OUTPATIENT)
Dept: GENERAL RADIOLOGY | Age: 52
Discharge: HOME | End: 2022-08-14
Payer: MEDICARE

## 2022-08-12 VITALS
HEIGHT: 63 IN | BODY MASS INDEX: 38.8 KG/M2 | WEIGHT: 219 LBS | TEMPERATURE: 98.2 F | OXYGEN SATURATION: 97 % | RESPIRATION RATE: 18 BRPM | HEART RATE: 62 BPM | DIASTOLIC BLOOD PRESSURE: 62 MMHG | SYSTOLIC BLOOD PRESSURE: 105 MMHG

## 2022-08-12 DIAGNOSIS — M48.02 STENOSIS OF CERVICAL SPINE WITH MYELOPATHY (HCC): Primary | ICD-10-CM

## 2022-08-12 DIAGNOSIS — M54.12 CERVICAL RADICULOPATHY: ICD-10-CM

## 2022-08-12 DIAGNOSIS — G99.2 STENOSIS OF CERVICAL SPINE WITH MYELOPATHY (HCC): Primary | ICD-10-CM

## 2022-08-12 DIAGNOSIS — G99.2 STENOSIS OF CERVICAL SPINE WITH MYELOPATHY (HCC): ICD-10-CM

## 2022-08-12 DIAGNOSIS — M48.02 STENOSIS OF CERVICAL SPINE WITH MYELOPATHY (HCC): ICD-10-CM

## 2022-08-12 PROCEDURE — 72050 X-RAY EXAM NECK SPINE 4/5VWS: CPT

## 2022-08-12 PROCEDURE — 99204 OFFICE O/P NEW MOD 45 MIN: CPT | Performed by: NURSE PRACTITIONER

## 2022-08-12 PROCEDURE — G8417 CALC BMI ABV UP PARAM F/U: HCPCS | Performed by: NURSE PRACTITIONER

## 2022-08-12 PROCEDURE — G8427 DOCREV CUR MEDS BY ELIG CLIN: HCPCS | Performed by: NURSE PRACTITIONER

## 2022-08-12 PROCEDURE — 3017F COLORECTAL CA SCREEN DOC REV: CPT | Performed by: NURSE PRACTITIONER

## 2022-08-12 PROCEDURE — 1036F TOBACCO NON-USER: CPT | Performed by: NURSE PRACTITIONER

## 2022-08-12 NOTE — PROGRESS NOTES
[address] [facility] [place]  [address], [address] [address]  Dept: [phone]    Patient:  Valery Chong  YOB: 1970  Date: 8/12/22    The patient is a 46 y.o. female who presents today for consult of the following problems:     Chief Complaint   Patient presents with    New Patient         HPI:     Valery Chong is a 46 y.o. female on whom neurosurgical consultation was requested by ИВАН Salas CNP for management of neck and left upper extremity pain. Patient has been struggling with left arm and left-sided neck pain for the last several months. Having radiating aching, numbness and tingling down left arm extending into left hand, index finger most prominent, does affect all fingers at times. Was evaluated by PCP, did complete cervical MRI that did show evidence of progressive disc protrusion at C6-C7 resulting in moderate central stenosis with bilateral foraminal narrowing. Patient has been treated with various NSAIDs, gabapentin, muscle relaxer, multiple courses of prednisone all with little relief. States that a few weeks ago while working felt popping sensations in her neck and has had significantly worsened symptoms since. Reports that the pain and numbness is much worse in her left arm. Has also had some hand clumsiness on the right side. Did have associated burning pain to her left foot that has somewhat subsided. Has also felt the pain radiating up the left side of her neck and even feels pain in her left jaw and tongue at times.     History:     Past Medical History:   Diagnosis Date    Anxiety     unison ,on meds in past     CAD (coronary artery disease) 5/5/2014    Chronic back pain     Depression     post partum and childhood    Fatty liver 3/10/2021    Fibromyalgia     Headache(784.0)     High cholesterol     Palpitations      Past Surgical History:   Procedure Laterality Date    APPENDECTOMY      CYSTOSCOPY N/A 5/24/2019    CYSTOSCOPY performed by Dex Harris MD at [address] [facility]       Family History   Problem Relation Age of Onset    Diabetes Mother     Cancer Mother         breast  age 76 cervical     Alcohol Abuse Father     Coronary Art Dis Sister     Cervical Cancer Sister     Breast Cancer Sister         age 48     Current Outpatient Medications on File Prior to Visit   Medication Sig Dispense Refill    sertraline (ZOLOFT) 50 MG tablet TAKE 1 TABLET BY MOUTH DAILY 90 tablet 1    gabapentin (NEURONTIN) 300 MG capsule Take 1 capsule by mouth in the morning and 1 capsule at noon and 1 capsule before bedtime. Do all this for 30 days. Intended supply: 90 days. 90 capsule 0    predniSONE (DELTASONE) 10 MG tablet Take 4 tabs X 3 days, then 3 tabs X 3 days, then 2 tabs X 3 days, then 1 tab X 3 days 30 tablet 0    naproxen (NAPROSYN) 500 MG tablet Take 1 tablet by mouth in the morning and 1 tablet in the evening. Take with meals.  180 tablet 1    tiZANidine (ZANAFLEX) 2 MG tablet Take 1 tablet by mouth nightly as needed (stiffness) 30 tablet 1    oxybutynin (DITROPAN-XL) 10 mg extended release tablet TAKE 1 TABLET BY MOUTH DAILY 90 tablet 3    VITAMIN E/D-ALPHA NATURAL 268 MG (400 UNIT) CAPS TAKE 1 CAPSULE BY MOUTH DAILY 30 capsule 3    diclofenac sodium (VOLTAREN) 1 % GEL APPLY 2 GRAMS TOPICALLY EVERY 6 HOURS AS NEEDED FOR PAIN AND SWELLING IN JOINT 100 g 0    Melatonin 1 MG CHEW Take by mouth      MAGNESIUM-OXIDE 400 (241.3 Mg) MG TABS tablet TAKE 1 TABLET BY MOUTH DAILY 90 tablet 2    Omega-3 Fatty Acids (FISH OIL PO) Take by mouth daily       Multiple Vitamins-Minerals (IMMUNE SUPPORT PO) Take by mouth daily      lidocaine (LMX) 4 % cream Apply 2-3 times a day as needed for pain 120 g 3    vitamin D (CHOLECALCIFEROL) 25 MCG (1000 UT) TABS tablet Take 1 tablet by mouth daily 90 tablet 5     No current facility-administered medications on file prior to visit. Social History     Tobacco Use    Smoking status: Never    Smokeless tobacco: Never   Vaping Use    Vaping Use: Never used   Substance Use Topics    Alcohol use: Yes     Alcohol/week: 0.0 standard drinks     Comment: rarely    Drug use: No       Allergies   Allergen Reactions    Effexor [Venlafaxine Hcl]     Morphine     Morphine Sulfate      Other reaction(s): Allergy: ITCHING;    Pcn [Penicillins]     Penicillin G      Other reaction(s): Unknown       Review of Systems  Constitutional: Negative for activity change and appetite change. HENT: Negative for ear pain and facial swelling. Eyes: Negative for discharge and itching. Respiratory: Negative for choking and chest tightness. Cardiovascular: Negative for chest pain and leg swelling. Gastrointestinal: Negative for nausea and abdominal pain. Endocrine: Negative for cold intolerance and heat intolerance. Genitourinary: Negative for frequency and flank pain. Musculoskeletal: Negative for myalgias and joint swelling. Skin: Negative for rash and wound. Allergic/Immunologic: Negative for environmental allergies and food allergies. Hematological: Negative for adenopathy. Does not bruise/bleed easily. Psychiatric/Behavioral: Negative for self-injury. The patient is not nervous/anxious. Physical Exam:      /62 (Site: Right Upper Arm, Position: Sitting, Cuff Size: Large Adult)   Pulse 62   Temp 98.2 °F (36.8 °C) (Oral)   Resp 18   Ht 5' 3\" (1.6 m)   Wt 219 lb (99.3 kg)   SpO2 97%   BMI 38.79 kg/m²   Estimated body mass index is 38.79 kg/m² as calculated from the following:    Height as of this encounter: 5' 3\" (1.6 m). Weight as of this encounter: 219 lb (99.3 kg). General:  Asaf Ambrocio is a 46y.o. year old female who appears her stated age. HEENT: Normocephalic atraumatic. Neck supple.   Chest: regular rate; pulses equal  Abdomen: Soft nontender management for consideration of C6-7 epidural injection. To return in the next 3 to 4 weeks for evaluation with surgeon after completion of updated MRI. Followup: Return in about 4 weeks (around 9/9/2022), or if symptoms worsen or fail to improve. Prescriptions Ordered:  No orders of the defined types were placed in this encounter. Orders Placed:  Orders Placed This Encounter   Procedures    MRI CERVICAL SPINE WO CONTRAST     Standing Status:   Future     Standing Expiration Date:   8/12/2023     Order Specific Question:   Reason for exam:     Answer:   progressive, severe pain, with new onset hyperreflexia/kulkarni's sign    XR CERVICAL SPINE (4-5 VIEWS)     Standing Status:   Future     Number of Occurrences:   1     Standing Expiration Date:   8/12/2023     Scheduling Instructions:      Please obtain upright AP; lateral flexion/extension/neutral     Order Specific Question:   Reason for exam:     Answer:   severe pain with LUE radiculopathy    [address]     Referral Priority:   Routine     Referral Type:   Eval and Treat     Referral Reason:   Specialty Services Required     Requested Specialty:   Physical Therapist     Number of Visits Requested:   [facility]     Referral Priority:   Routine     Referral Type:   Eval and Treat     Referral Reason:   Specialty Services Required     Requested Specialty:   Pain Management     Number of Visits Requested:   1        Electronically signed by ИВАН Neri CNP on 8/12/2022 at 11:28 AM    Please note that this chart was generated using voice recognition Dragon dictation software. Although every effort was made to ensure the accuracy of this automated transcription, some errors in transcription may have occurred.

## 2022-08-19 DIAGNOSIS — M54.12 CERVICAL RADICULOPATHY: ICD-10-CM

## 2022-08-19 RX ORDER — PREDNISONE 10 MG/1
TABLET ORAL
Qty: 30 TABLET | Refills: 0 | OUTPATIENT
Start: 2022-08-19

## 2022-08-19 NOTE — TELEPHONE ENCOUNTER
Patient is requesting a refill on steroid until her pain management appointment on 9/18/2022. Patient that the pain in arm is increased with out this medication.

## 2022-08-25 ENCOUNTER — HOSPITAL ENCOUNTER (OUTPATIENT)
Dept: MRI IMAGING | Age: 52
Discharge: HOME | End: 2022-08-27
Payer: MEDICARE

## 2022-08-25 DIAGNOSIS — G99.2 STENOSIS OF CERVICAL SPINE WITH MYELOPATHY (HCC): ICD-10-CM

## 2022-08-25 DIAGNOSIS — M54.12 CERVICAL RADICULOPATHY: ICD-10-CM

## 2022-08-25 DIAGNOSIS — M48.02 STENOSIS OF CERVICAL SPINE WITH MYELOPATHY (HCC): ICD-10-CM

## 2022-08-25 PROCEDURE — 72141 MRI NECK SPINE W/O DYE: CPT

## 2022-09-07 DIAGNOSIS — M50.30 DDD (DEGENERATIVE DISC DISEASE), CERVICAL: ICD-10-CM

## 2022-09-07 DIAGNOSIS — M79.7 FIBROMYALGIA: ICD-10-CM

## 2022-09-07 DIAGNOSIS — M54.12 CERVICAL RADICULOPATHY: ICD-10-CM

## 2022-09-07 RX ORDER — GABAPENTIN 300 MG/1
CAPSULE ORAL
Qty: 90 CAPSULE | Refills: 0 | Status: SHIPPED | OUTPATIENT
Start: 2022-09-07 | End: 2022-10-31

## 2022-09-16 DIAGNOSIS — M50.30 DDD (DEGENERATIVE DISC DISEASE), CERVICAL: ICD-10-CM

## 2022-09-16 DIAGNOSIS — M54.12 CERVICAL RADICULOPATHY: ICD-10-CM

## 2022-09-16 DIAGNOSIS — M79.7 FIBROMYALGIA: ICD-10-CM

## 2022-09-16 RX ORDER — GABAPENTIN 300 MG/1
CAPSULE ORAL
Qty: 90 CAPSULE | Refills: 0 | OUTPATIENT
Start: 2022-09-16

## 2022-09-26 ENCOUNTER — HOSPITAL ENCOUNTER (OUTPATIENT)
Age: 52
Discharge: HOME | End: 2022-09-26
Payer: MEDICARE

## 2022-09-26 ENCOUNTER — OFFICE VISIT (OUTPATIENT)
Dept: NEUROSURGERY | Age: 52
End: 2022-09-26
Payer: MEDICARE

## 2022-09-26 VITALS
SYSTOLIC BLOOD PRESSURE: 117 MMHG | OXYGEN SATURATION: 97 % | TEMPERATURE: 97.7 F | RESPIRATION RATE: 20 BRPM | DIASTOLIC BLOOD PRESSURE: 77 MMHG | HEART RATE: 79 BPM | WEIGHT: 222 LBS | HEIGHT: 63 IN | BODY MASS INDEX: 39.34 KG/M2

## 2022-09-26 DIAGNOSIS — M54.12 CERVICAL RADICULOPATHY: ICD-10-CM

## 2022-09-26 DIAGNOSIS — M50.10 HERNIATION OF CERVICAL INTERVERTEBRAL DISC WITH RADICULOPATHY: Primary | ICD-10-CM

## 2022-09-26 DIAGNOSIS — M79.7 FIBROMYALGIA: ICD-10-CM

## 2022-09-26 DIAGNOSIS — M50.30 DDD (DEGENERATIVE DISC DISEASE), CERVICAL: ICD-10-CM

## 2022-09-26 PROCEDURE — G8427 DOCREV CUR MEDS BY ELIG CLIN: HCPCS | Performed by: NEUROLOGICAL SURGERY

## 2022-09-26 PROCEDURE — G8417 CALC BMI ABV UP PARAM F/U: HCPCS | Performed by: NEUROLOGICAL SURGERY

## 2022-09-26 PROCEDURE — G0481 DRUG TEST DEF 8-14 CLASSES: HCPCS

## 2022-09-26 PROCEDURE — 80307 DRUG TEST PRSMV CHEM ANLYZR: CPT

## 2022-09-26 PROCEDURE — 3017F COLORECTAL CA SCREEN DOC REV: CPT | Performed by: NEUROLOGICAL SURGERY

## 2022-09-26 PROCEDURE — 99214 OFFICE O/P EST MOD 30 MIN: CPT | Performed by: NEUROLOGICAL SURGERY

## 2022-09-26 PROCEDURE — 1036F TOBACCO NON-USER: CPT | Performed by: NEUROLOGICAL SURGERY

## 2022-09-26 NOTE — PROGRESS NOTES
[address] [facility] [place]  [address], [address] [facility] [address]  Dept: [phone]    Patient:  Delroy Lozano  YOB: 1970  Date: 9/26/22    The patient is a 46 y.o. female who presents today for consult of the following problems:     No chief complaint on file. HPI:     Delroy Lozano is a 46 y.o. female on whom neurosurgical consultation was requested by ИВАН Azul CNP for management of cervical spondylosis with radiculopathy on the left side. Patient has been dealing with this for a number of weeks now and has been to 2 steroid tapers unfortunately but the pain still comes to returning. Axial spasmodic discomfort left paraspinal greater than the right radiating down the left upper extremity with persistent numbness of the index finger on the left hand but intermittently all of her fingers appear to go numb as well. There is no correlation with the neck in general. 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  Has used ice heat without relief      History:     Past Medical History:   Diagnosis Date    Anxiety     unison ,on meds in past     CAD (coronary artery disease) 5/5/2014    Chronic back pain     Depression     post partum and childhood    Fatty liver 3/10/2021    Fibromyalgia     Headache(784.0)     High cholesterol     Palpitations      Past Surgical History:   Procedure Laterality Date    APPENDECTOMY      CYSTOSCOPY N/A 5/24/2019    CYSTOSCOPY performed by Susy Worley MD at [address] [facility]       Family History   Problem Relation Age of Onset    Diabetes Mother     Cancer Mother         breast  age 76 cervical     Alcohol Abuse Father     Coronary Art Dis Sister     Cervical Cancer Sister     Breast Cancer Sister         age 48     Current Outpatient Medications on File Prior to Visit Medication Sig Dispense Refill    gabapentin (NEURONTIN) 300 MG capsule TAKE 1 CAPSULE BY MOUTH IN THE MORNING, 1 CAPSULE AT NOON, AND 1 CAPSULE BEFORE BEDTIME 90 capsule 0    sertraline (ZOLOFT) 50 MG tablet TAKE 1 TABLET BY MOUTH DAILY 90 tablet 1    predniSONE (DELTASONE) 10 MG tablet Take 4 tabs X 3 days, then 3 tabs X 3 days, then 2 tabs X 3 days, then 1 tab X 3 days 30 tablet 0    naproxen (NAPROSYN) 500 MG tablet Take 1 tablet by mouth in the morning and 1 tablet in the evening. Take with meals. 180 tablet 1    tiZANidine (ZANAFLEX) 2 MG tablet Take 1 tablet by mouth nightly as needed (stiffness) 30 tablet 1    oxybutynin (DITROPAN-XL) 10 mg extended release tablet TAKE 1 TABLET BY MOUTH DAILY 90 tablet 3    VITAMIN E/D-ALPHA NATURAL 268 MG (400 UNIT) CAPS TAKE 1 CAPSULE BY MOUTH DAILY 30 capsule 3    diclofenac sodium (VOLTAREN) 1 % GEL APPLY 2 GRAMS TOPICALLY EVERY 6 HOURS AS NEEDED FOR PAIN AND SWELLING IN JOINT 100 g 0    Melatonin 1 MG CHEW Take by mouth      MAGNESIUM-OXIDE 400 (241.3 Mg) MG TABS tablet TAKE 1 TABLET BY MOUTH DAILY 90 tablet 2    Omega-3 Fatty Acids (FISH OIL PO) Take by mouth daily       Multiple Vitamins-Minerals (IMMUNE SUPPORT PO) Take by mouth daily      lidocaine (LMX) 4 % cream Apply 2-3 times a day as needed for pain 120 g 3    vitamin D (CHOLECALCIFEROL) 25 MCG (1000 UT) TABS tablet Take 1 tablet by mouth daily 90 tablet 5     No current facility-administered medications on file prior to visit. Social History     Tobacco Use    Smoking status: Never    Smokeless tobacco: Never   Vaping Use    Vaping Use: Never used   Substance Use Topics    Alcohol use: Yes     Alcohol/week: 0.0 standard drinks     Comment: rarely    Drug use: No       Allergies   Allergen Reactions    Effexor [Venlafaxine Hcl]     Morphine     Morphine Sulfate      Other reaction(s):  Allergy: ITCHING;    Pcn [Penicillins]     Penicillin G      Other reaction(s): Unknown       Review of Systems  ROS: neck pain, numbness LUE    Physical Exam:      /77 (Site: Left Upper Arm, Position: Sitting, Cuff Size: Large Adult)   Pulse 79   Temp 97.7 °F (36.5 °C) (Temporal)   Resp 20   Ht 5' 3\" (1.6 m)   Wt 222 lb (100.7 kg)   SpO2 97%   BMI 39.33 kg/m²   Estimated body mass index is 39.33 kg/m² as calculated from the following:    Height as of this encounter: 5' 3\" (1.6 m). Weight as of this encounter: 222 lb (100.7 kg). General:  Sophie Dye is a 46y.o. year old female who appears her stated age. HEENT: Normocephalic atraumatic. Neck supple. Chest: regular rate; pulses equal. Equal chest rise and fall  Abdomen: Soft nondistended. Ext: DP equal with good capillary refill  Neuro    Mentation  Appropriate affect   oriented    Cranial Nerves:   Pupils equal and reactive to light  Extraocular motion intact  Face symmetric  No dysarthria  v1-3 sensation symmetric, masseter tone symmetric  Hearing symmetric and intact to finger rub    Sensation:   intact    Motor  L deltoid 5/5; R deltoid 5/5  L biceps 5/5; R biceps 5/5  L triceps 5/5; R triceps 5/5  L wrist extension 5/5; R wrist extension 5/5  L intrinsics 5/5; R intrinsics 5/5     L iliopsoas 5/5 , R iliopsoas 5/5  L quadriceps 5/5; R quadriceps 5/5  L Dorsiflexion 5/5; R dorsiflexion 5/5  L Plantarflexion 5/5; R plantarflexion 5/5  L EHL 5/5; R EHL 5/5    Reflexes  L Brachioradialis 2+/4; R brachioradialis 2+/4  L Biceps 2+/4; R Biceps 2+/4  L Triceps 2+/4; R Triceps 2+/4  L Patellar 2+/4: R Patellar 2+/4  L Achilles 2+/4; R Achilles 2+/4  + kulkarni b/l  No clonus  Neg spurlings b/l      Studies Review:     Mri C multilevel degenerative disc disease with a C6-7 left-sided lateral recess and foraminal disc. Assessment and Plan:      1.  Herniation of cervical intervertebral disc with radiculopathy          Plan: No distinct dermatomal distribution as far as the radicular complaints though there is a left-sided C6-7 disc that may be affecting the C7 nerve root. Recommend continuation of conservative measures including PT for traction gentle strengthening and myofascial release. Also would recommend continuation of pain management referral for cervical epidural injection. This point no ominous signs or symptoms or central cord compression warranting immediate intervention. Followup: No follow-ups on file. Prescriptions Ordered:  No orders of the defined types were placed in this encounter. Orders Placed:  No orders of the defined types were placed in this encounter. Electronically signed by Sylvia Lindsay DO on 9/26/2022 at 3:49 PM    Please note that this chart was generated using voice recognition Dragon dictation software. Although every effort was made to ensure the accuracy of this automated transcription, some errors in transcription may have occurred.

## 2022-10-01 LAB
6-ACETYLMORPHINE, UR: NOT DETECTED
7-AMINOCLONAZEPAM, URINE: NOT DETECTED
ALPHA-OH-ALPRAZ, URINE: NOT DETECTED
ALPHA-OH-MIDAZOLAM, URINE: NOT DETECTED
ALPRAZOLAM, URINE: NOT DETECTED
AMPHETAMINES, URINE: NOT DETECTED
BARBITURATES, URINE: NOT DETECTED
BENZOYLECGONINE, UR: NOT DETECTED
BUPRENORPHINE URINE: NOT DETECTED
CARISOPRODOL, UR: NOT DETECTED
CLONAZEPAM, URINE: NOT DETECTED
CODEINE, URINE: NOT DETECTED
CREATININE URINE: 152.2 MG/DL (ref 20–400)
DIAZEPAM, URINE: NOT DETECTED
DRUGS EXPECTED, UR: NORMAL
EER HI RES INTERP UR: NORMAL
ETHYL GLUCURONIDE UR: PRESENT
FENTANYL URINE: NOT DETECTED
GABAPENTIN: PRESENT
HYDROCODONE, URINE: NOT DETECTED
HYDROMORPHONE, URINE: NOT DETECTED
LORAZEPAM, URINE: NOT DETECTED
MARIJUANA METAB, UR: NOT DETECTED
MDA, UR: NOT DETECTED
MDEA, EVE, UR: NOT DETECTED
MDMA URINE: NOT DETECTED
MEPERIDINE METAB, UR: NOT DETECTED
METHADONE, URINE: NOT DETECTED
METHAMPHETAMINE, URINE: NOT DETECTED
METHYLPHENIDATE: NOT DETECTED
MIDAZOLAM, URINE: NOT DETECTED
MORPHINE URINE: NOT DETECTED
NALOXONE URINE: NOT DETECTED
NORBUPRENORPHINE, URINE: NOT DETECTED
NORDIAZEPAM, URINE: NOT DETECTED
NORFENTANYL, URINE: NOT DETECTED
NORHYDROCODONE, URINE: NOT DETECTED
NOROXYCODONE, URINE: NOT DETECTED
NOROXYMORPHONE, URINE: NOT DETECTED
OXAZEPAM, URINE: NOT DETECTED
OXYCODONE URINE: NOT DETECTED
OXYMORPHONE, URINE: NOT DETECTED
PAIN MANAGEMENT DRUG PANEL INTERP, URINE: NORMAL
PAIN MGT DRUG PANEL, HI RES, UR: NORMAL
PCP,URINE: NOT DETECTED
PHENTERMINE, UR: NOT DETECTED
PREGABALIN: NOT DETECTED
TAPENTADOL, URINE: NOT DETECTED
TAPENTADOL-O-SULFATE, URINE: NOT DETECTED
TEMAZEPAM, URINE: NOT DETECTED
TRAMADOL, URINE: NOT DETECTED
ZOLPIDEM METABOLITE (ZCA), URINE: NOT DETECTED
ZOLPIDEM, URINE: NOT DETECTED

## 2022-10-27 ENCOUNTER — TELEPHONE (OUTPATIENT)
Dept: FAMILY MEDICINE CLINIC | Age: 52
End: 2022-10-27

## 2022-10-27 NOTE — TELEPHONE ENCOUNTER
PATIENT CALLED STATING HER GABAPENTIN 300 mg IS NOT LASTING AND SHE IS WONDERING IF THE DOSAGE CAN BE CHANGED.   PLEASE ADVISE

## 2022-10-28 DIAGNOSIS — M54.12 CERVICAL RADICULOPATHY: ICD-10-CM

## 2022-10-28 DIAGNOSIS — M79.7 FIBROMYALGIA: ICD-10-CM

## 2022-10-28 DIAGNOSIS — M50.30 DDD (DEGENERATIVE DISC DISEASE), CERVICAL: ICD-10-CM

## 2022-10-31 RX ORDER — GABAPENTIN 300 MG/1
CAPSULE ORAL
Qty: 90 CAPSULE | Refills: 0 | Status: SHIPPED | OUTPATIENT
Start: 2022-10-31 | End: 2022-12-01 | Stop reason: SDUPTHER

## 2022-10-31 NOTE — TELEPHONE ENCOUNTER
Please Approve or Refuse.   Send to Pharmacy per Pt's Request: [facility]      Next Visit Date:  11/9/2022   Last Visit Date: 8/4/2022    Hemoglobin A1C (%)   Date Value   04/08/2019 5.6             ( goal A1C is < 7)   BP Readings from Last 3 Encounters:   09/26/22 117/77   08/12/22 105/62   08/03/22 (!) 139/97          (goal 120/80)  BUN   Date Value Ref Range Status   08/23/2021 13 6 - 20 mg/dL Final     Creatinine   Date Value Ref Range Status   08/23/2021 0.87 0.50 - 0.90 mg/dL Final     Potassium   Date Value Ref Range Status   08/23/2021 4.8 3.7 - 5.3 mmol/L Final

## 2022-11-07 NOTE — PROGRESS NOTES
[facility] PHYSICIANS  [facility] PHYSICIANS [facility]  [address]  [address] [address]  Dept: 1 [facility]  (:  1970) is a 46 y.o. female. Patient is here for evaluation of the following chief complaint(s):  Chief Complaint   Patient presents with    Medication Problem    Cough    Pharyngitis    Congestion    Alopecia       SUBJECTIVE/OBJECTIVE:  SHYAM Sebastian is a 46 y.o. female patient. Patient is an established patient of mine. . Patient has a known history of hyperlipoidemia, cervical radiculopathy, fibromyalgia, low back pain, vitamin B12 deficiency, overactive bladder, hair loss, hyperglycemia, and obesity. ACUTE BACTERIAL BRONCHITIS  Patient reported some worsening productive cough, postnasal drip, runny nose, some mild shortness of breath. Patient was treated 2 weeks ago for sinusitis. Patient reported being. Exposed to RSV, not tested. However, patient did check herself for COVID-19 virus which came back negative. Denies any fever chills but continues to cough. CERVICAL RADICULOPATHY/LUMBAR RADICULOPATHY/ FIBROMYALGIA  Patient has a known history of degenerative disc disease of the cervical spine. She had some severe exacerbation of her neck pain when she started working as a  at [employer]. Due to some mopping and repetitive motion. 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 Patient is doing better however, MRI showed facet arthropathy and disc protrusion. Patient was eventually referred to the neurosurgeon. Patient seen the neurosurgeon and eventually sent to pain management. Patient has some been set up for physical therapy. Started gabapentin doing better. Patient have not been doing Physical therapy  Seen Dr Justin Cox in September. Recommended PT and pain management. Pain management 10/05/2020 Jase Vang- not seen. Pain legs arms.  She does complain of some numbness on both arms  Patient currently on  naproxen, gabapentin, tizanidine, reports fair relief. 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 Future Appointments   Date Time Provider Derek Rosas   12/21/2022 10:00 AM DO Arslan Schneider   2/9/2023 11:30 AM Elieser Rodriguez MD [facility]   PREVIOUS NOTES  Patient states that her pain is \"unbearable and intense\"  She went to the ER yesterday was given some medication/ Norco. A few days. She has an appointment with the neuro surgeon sooner. Pain radiation on her Left jaw and neck. 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 She missed some work. She felt some cracking noises on her cervical area after bending. Posterior muscles stiffening and left body. No falls, no weakness. No loss of bowel, bladder control  Patient have always had some chronic neck and back pain so we struggled with this. NEW MRI RESULT       Impression   Since prior MRI, worsening canal and foraminal stenosis at C6-C7. At C3-C4, grade 1 retrolisthesis and central disc protrusion. At C6-C7, moderate canal stenosis and severe bilateral neural foraminal   narrowing       At C7-T1, right paracentral disc protrusion. cervical MRI 2019       Impression   Multilevel degenerative disc disease with associated uncovertebral and facet   hypertrophy.        Moderate right and severe left foraminal narrowing at C6-7.      9/26/22 16:24   Alpha-OH-Midazolam, Urine Not Detected   Benzoylecgonine, Ur Not Detected   Carisoprodol, Ur Not Detected   Drugs Expected, Ur .Random Urine   EER Hi Res Interp Ur See Note   Gabapentin Present   Marijuana Metab, Ur Not Detected   MDA, Ur Not Detected   MDEA, LUZ, Ur Not Detected   Meperidine Metab, Ur Not Detected   Pain Mgt Drug Panel, Hi Res, Ur See Below   Pain Management Drug Panel Interp, Urine See Note   Tapentadol, Urine Not Detected   Tramadol, Urine Not Detected   Zolpidem Metabolite (ZCA), Urine Not Detected   Zolpidem, Urine Not Detected     Nicol Torres is currently on OTC Fish Oil. Patient denies adverse reaction to this medication. Compliance with treatment thus far has been good. The patient is not known to have coexisting coronary artery disease. The 10-year CVD risk score (D'Agostino, et al., 2008) is: 4.2%    Values used to calculate the score:      Age: 46 years      Sex: Female      Diabetic: No      Tobacco smoker: No      Systolic Blood Pressure: 742 mmHg      Is BP treated: No      HDL Cholesterol: 46 mg/dL      Total Cholesterol: 188 mg/dL  Lab Results   Component Value Date/Time    CHOLFAST 198 04/09/2019 11:32 AM    CHOL 188 02/13/2021 12:48 PM    HDL 46 02/13/2021 12:48 PM    LDLCHOLESTEROL 117 02/13/2021 12:48 PM    TRIGLYCFAST 114 04/09/2019 11:32 AM    CHOLHDLRATIO 4.1 02/13/2021 12:48 PM    TRIG 127 02/13/2021 12:48 PM    VLDL NOT REPORTED 02/13/2021 12:48 PM      Kim Gann 's Diabetes Screening -Hemoglobin A1c level was normal. Patient no familial history of diabetes. We will continue to screen annually. Lab Results   Component Value Date/Time    LABA1C 5.9 11/09/2022 10:48 AM     DEPRESSION AND ANXIETY  Michelle Moy reported some ongoing issues with depression and anxiety. S  Current therapy includes Zoloft, which is working well for her. she denies adverse reaction to current therapy. she also denies suicidal/homicidal ideation, plan or intent.          TROY-7 SCREENING 12/14/2021 9/15/2020 11/7/2018   Feeling nervous, anxious, or on edge Not at all - -   Not being able to stop or control worrying Not at all - -   Worrying too much about different things More than half the days - -   Trouble relaxing Not at all - -   Being so restless that it is hard to sit still Not at all - -   Becoming easily annoyed or irritable Not at all - -   Feeling afraid as if something awful might happen Not at all - -   TROY-7 Total Score 2 - -   How difficult have these problems made it for you to do your work, take care of things at home, or get along with other people? Not difficult at all - -   Feeling nervous, anxious, or on edge - 0-Not at all 3-Nearly every day   Not able to stop or control worrying - 0-Not at all 3-Nearly every day   Worrying too much about different things - 0-Not at all 3-Nearly every day   Trouble relaxing - 0-Not at all 3-Nearly every day   Being so restless that it's hard to sit still - 0-Not at all 1-Several days   Becoming easily annoyed or irritable - 1-Several days 3-Nearly every day   Feeling afraid as if something awful might happen - 0-Not at all 3-Nearly every day   TROY-7 Total Score - 1 19        ZULMA Moy is a 46 y.o. female patient Patient currently on  oxybutinin. Patient reports fair relief. VITAMIN B 12 /FOLATE DEFICIENCIES  Kim  is not taking Vitamin B12 supplementation. Kim reports some numbness and tingling and fatigue. Lab Results   Component Value Date    [id number] 956 02/13/2021     Lab Results   Component Value Date/Time    FOLATE 14.9 02/13/2021 12:48 PM   WEIGHT  Patient's BMI is Body mass index is 39.93 kg/m². kg/m2. BMI is increasing. Patient understands that this condition increases the patient's risk for chronic conditions. Wt Readings from Last 3 Encounters:   11/09/22 225 lb 6.4 oz (102.2 kg)   09/26/22 222 lb (100.7 kg)   08/25/22 219 lb (99.3 kg)       DEPRESSION SCREENING: Negative  PHQ Scores 6/21/2022 12/14/2021 10/11/2021 3/12/2021 9/15/2020 11/7/2018 1/27/2015   PHQ2 Score 0 0 0 0 0 6 6   PHQ9 Score 1 0 0 0 0 15 19     Kim is due for Varicella vaccine. her  indication is age over 48. Benefits of getting immunization against shingles discussed, and patient is agreeable. she  denies side effects to prior immunizations. .    Patient is due for colon cancer screening.   Kim denies nausea, vomiting, diarrhea, constipation, blood in the stool or abdominal pain. We discussed options, she would like to have: Jose F. VITAL SIGNS:  Vitals:    11/09/22 1014   BP: 119/74   Site: Right Upper Arm   Position: Sitting   Cuff Size: Medium Adult   Pulse: 75   SpO2: 97%   Weight: 225 lb 6.4 oz (102.2 kg)   Height: 5' 3\" (1.6 m)   Estimated body mass index is 39.93 kg/m² as calculated from the following:    Height as of this encounter: 5' 3\" (1.6 m). Weight as of this encounter: 225 lb 6.4 oz (102.2 kg). Review of Systems   Constitutional:  Positive for activity change. Negative for chills and fever. HENT:  Positive for congestion, postnasal drip and sore throat. Eyes:  Negative for discharge and itching. Respiratory:  Positive for cough. Negative for shortness of breath and wheezing. Cardiovascular:  Positive for chest pain. Negative for palpitations. Gastrointestinal:  Negative for abdominal pain. Genitourinary:  Negative for dysuria. Musculoskeletal:  Positive for arthralgias, back pain, gait problem, joint swelling and neck pain. Allergic/Immunologic: Positive for environmental allergies. Neurological:  Positive for numbness. Negative for weakness and headaches. Psychiatric/Behavioral:  Negative for dysphoric mood, sleep disturbance and suicidal ideas. The patient is nervous/anxious. Physical Exam  Vitals and nursing note reviewed. Constitutional:       Appearance: Normal appearance. She is obese. HENT:      Head: Normocephalic. Nose: Nose normal.   Cardiovascular:      Rate and Rhythm: Normal rate and regular rhythm. Pulmonary:      Effort: Pulmonary effort is normal.      Breath sounds: Normal breath sounds. Musculoskeletal:      Cervical back: No tenderness or bony tenderness. Pain with movement present. Decreased range of motion. Thoracic back: No tenderness. Decreased range of motion. Lumbar back: Decreased range of motion.  Negative right straight leg raise test and negative left straight leg raise test.   Skin:     General: Skin is warm and dry. Neurological:      Mental Status: She is alert and oriented to person, place, and time. Sensory: No sensory deficit. Motor: No weakness or tremor. Gait: Gait normal.   Psychiatric:         Mood and Affect: Mood is anxious. MEDICAL HISTORY      Diagnosis Date    Anxiety     unison ,on meds in past     CAD (coronary artery disease) 5/5/2014    Chronic back pain     Depression     post partum and childhood    Fatty liver 3/10/2021    Fibromyalgia     Headache(784.0)     High cholesterol     Palpitations       MEDICATIONS  Prior to Visit Medications    Medication Sig Taking? Authorizing Provider   vitamin B-12 250 MCG tablet Take 1 tablet by mouth daily Yes ИВАН Van CNP   zoster recombinant adjuvanted vaccine (SHINGRIX) 50 MCG/0.5ML SUSR injection Inject 0.5 mLs into the muscle once for 1 dose 1 dose now and repeat in 2-6 months Yes ИВАН Van CNP   guaiFENesin (MUCINEX) 600 MG extended release tablet Take 1 tablet by mouth 2 times daily for 15 days Yes ИВАН Van CNP   doxycycline hyclate (VIBRA-TABS) 100 MG tablet Take 1 tablet by mouth 2 times daily for 5 days Yes ИВАН Van CNP   fluticasone (FLONASE) 50 MCG/ACT nasal spray 1 spray by Each Nostril route daily Yes ИВАН Van CNP   gabapentin (NEURONTIN) 300 MG capsule TAKE ONE CAPSULE BY MOUTH IN THE MORNING, 1 CAPSULE AT NOON, AND 1 CAPSULE BEFORE BEDTIME Yes Hui Rai MD   sertraline (ZOLOFT) 50 MG tablet TAKE 1 TABLET BY MOUTH DAILY Yes ИВАН Van CNP   naproxen (NAPROSYN) 500 MG tablet Take 1 tablet by mouth in the morning and 1 tablet in the evening. Take with meals.  Yes ИВАН Van CNP   tiZANidine (ZANAFLEX) 2 MG tablet Take 1 tablet by mouth nightly as needed (stiffness) Yes ИВАН Van CNP oxybutynin (DITROPAN-XL) 10 mg extended release tablet TAKE 1 TABLET BY MOUTH DAILY Yes Raina Almanzar MD   VITAMIN E/D-ALPHA NATURAL 268 MG (400 UNIT) CAPS TAKE 1 CAPSULE BY MOUTH DAILY Yes Raina Almanzar MD   diclofenac sodium (VOLTAREN) 1 % GEL APPLY 2 GRAMS TOPICALLY EVERY 6 HOURS AS NEEDED FOR PAIN AND SWELLING IN JOINT Yes Raina Almanzar MD   Melatonin 1 MG CHEW Take by mouth Yes Historical Provider, MD   MAGNESIUM-OXIDE 400 (241.3 Mg) MG TABS tablet TAKE 1 TABLET BY MOUTH DAILY Yes ИВАН Van CNP   Omega-3 Fatty Acids (FISH OIL PO) Take by mouth daily  Yes Historical Provider, MD   Multiple Vitamins-Minerals (IMMUNE SUPPORT PO) Take by mouth daily Yes Historical Provider, MD   lidocaine (LMX) 4 % cream Apply 2-3 times a day as needed for pain Yes Stu Vargas MD   vitamin D (CHOLECALCIFEROL) 25 MCG (1000 UT) TABS tablet Take 1 tablet by mouth daily  ИВАН Van CNP     Controlled Substance Monitoring:  Acute and Chronic Pain Monitoring:   RX Monitoring 11/9/2022   Attestation -   Periodic Controlled Substance Monitoring No signs of potential drug abuse or diversion identified. ASSESSMENT/PLAN:  1. Acute bacterial bronchitis  Worsening  DISCUSSED AND ADVISED TO:  Rest.  Advised to increase fluid intake. Eat more fruits and vegetables. Take medications as discussed. Report for worsening symptoms.    - guaiFENesin (MUCINEX) 600 MG extended release tablet; Take 1 tablet by mouth 2 times daily for 15 days  Dispense: 30 tablet; Refill: 0  - doxycycline hyclate (VIBRA-TABS) 100 MG tablet; Take 1 tablet by mouth 2 times daily for 5 days  Dispense: 10 tablet; Refill: 0  - fluticasone (FLONASE) 50 MCG/ACT nasal spray; 1 spray by Each Nostril route daily  Dispense: 16 g; Refill: 1    2. Hyperlipidemia with target LDL less than 100  Stable  Continue therapy. Advised to decrease the consumption of red meats, fried foods, trans fats, sweets, sugary beverages.    Advised to increase fish, vegetables, and fruits consumption. Advised to add fiber or OTC supplements in diet. Discussed weight loss which will result in improvement of lipids levels. Advised to increase daily physical activities and add regular exercises. 3. Cervical radiculopathy  Worsening  Continue current therapy. DISCUSSED and ADVISED TO:  Stay at a healthy weight. Continue exercises/PT  Stretch to help prevent stiffness and to prevent injury before exercise. Gentle forms of yoga help keep joints and muscles flexible. Walk instead of jog, ride a bike, swim, and water exercise. Lift weights as tolerated. strong muscles help reduce stress on joints. Take pain medicines exactly as directed and only as needed. 4. Chronic midline low back pain without sciatica  Failure to Improve  Continue current therapy. DISCUSSED AND ADVISED TO:  Use heat packs 15 to 20 mins every 2-3 hours. Do some back stretches as tolerated. Refer to hand out for instructions. Call for worsening, numbness, weakness. 5. Fibromyalgia  Failure to Improve  DISCUSSED AND ADVISED TO:  Exercise often. Get a good night's sleep. Make healthy changes to diet. Quit smoking   Try to reduce stress  Carefully take medications as discussed  Report for worsening symptoms            6. DDD (degenerative disc disease), cervical  Failure to Improve  Continue current therapy. DISCUSSED and ADVISED TO:  Stay at a healthy weight. Continue exercises/PT  Stretch to help prevent stiffness and to prevent injury before exercise. Gentle forms of yoga help keep joints and muscles flexible. Walk instead of jog, ride a bike, swim, and water exercise. Lift weights as tolerated. strong muscles help reduce stress on joints. Take pain medicines exactly as directed and only as needed. 7. Recurrent major depressive disorder, in full remission ([address]  Stable  Continue current therapy.   DISCUSSED and ADVISED TO:  Not stopping medication suddenly. See the specialist as discussed. Report for feelings of SI, HI, and hallucinations. Go to the ER for increasing urge to hurt yourself. 8. Vitamin B 12 deficiency  Failure to Improve  Continue current therapy  Continue to monitor    - vitamin B-12 250 MCG tablet; Take 1 tablet by mouth daily  Dispense: 30 tablet; Refill: 2    9. OAB (overactive bladder)  Stable  Continue current therapy      10. Hair loss  Failure to Improve  Continue to evaluate  Recommend     - TSH; Future    11. Hyperglycemia  Failure to Improve  Continue to evaluate  Recommend     - POCT glycosylated hemoglobin (Hb A1C); Future  - POCT glycosylated hemoglobin (Hb A1C)    12. Class 2 severe obesity due to excess calories with serious comorbidity and body mass index (BMI) of 39.0 to 39.9 in adult [facility])  Failure to Improve  BMI increasing  DISCUSSED AND ADVISED TO:  Eat a low-fat and low carbohydrates diet. Avoid fried foods especially fast food. Choose healthier options for snacks. Have 5-6 servings of fruits and vegetables per day. Cut down on eating processed food. Add 30 minutes to 1 hour aerobic exercise for 3-4 days a week. 13. Colon cancer screening  Failure to Improve  Continue to evaluate  Recommend     - Cologuard (Fecal DNA Colorectal Cancer Screening)    14. Need for varicella vaccine  Stable  Recommended by CDC. No current infection. Denies previous adverse reaction to vaccination. - zoster recombinant adjuvanted vaccine [facility]) 50 MCG/0.5ML SUSR injection; Inject 0.5 mLs into the muscle once for 1 dose 1 dose now and repeat in 2-6 months  Dispense: 0.5 mL; Refill: 0   On this date 11/9/2022 I have spent 35 minutes reviewing previous notes, test results and face to face with the patient discussing the diagnosis and importance of compliance with the treatment plan as well as documenting on the day of the visit.     Return in about 3 months (around 2/9/2023) for Chronic conditions, Neto dumont/  LATANYA.    This note was completed by using the assistance of a speech-recognition program. However, inadvertent computerized transcription errors may be present. Although every effort was made to ensure accuracy, no guarantees can be provided that every mistake has been identified and corrected by editing.   Electronically signed by ИВАН Smith CNP on 11/6/22 at 10:08 PM EST     --ИВАН Van CNP

## 2022-11-08 ASSESSMENT — ENCOUNTER SYMPTOMS
BACK PAIN: 1
EYE ITCHING: 0
ABDOMINAL PAIN: 0
SHORTNESS OF BREATH: 0
WHEEZING: 0
EYE DISCHARGE: 0

## 2022-11-09 ENCOUNTER — TELEPHONE (OUTPATIENT)
Dept: FAMILY MEDICINE CLINIC | Age: 52
End: 2022-11-09

## 2022-11-09 ENCOUNTER — OFFICE VISIT (OUTPATIENT)
Dept: FAMILY MEDICINE CLINIC | Age: 52
End: 2022-11-09
Payer: MEDICARE

## 2022-11-09 VITALS
HEIGHT: 63 IN | OXYGEN SATURATION: 97 % | DIASTOLIC BLOOD PRESSURE: 74 MMHG | BODY MASS INDEX: 39.94 KG/M2 | HEART RATE: 75 BPM | WEIGHT: 225.4 LBS | SYSTOLIC BLOOD PRESSURE: 119 MMHG

## 2022-11-09 DIAGNOSIS — L65.9 HAIR LOSS: ICD-10-CM

## 2022-11-09 DIAGNOSIS — M54.12 CERVICAL RADICULOPATHY: ICD-10-CM

## 2022-11-09 DIAGNOSIS — Z12.11 COLON CANCER SCREENING: ICD-10-CM

## 2022-11-09 DIAGNOSIS — J20.8 ACUTE BACTERIAL BRONCHITIS: Primary | ICD-10-CM

## 2022-11-09 DIAGNOSIS — M54.50 CHRONIC MIDLINE LOW BACK PAIN WITHOUT SCIATICA: ICD-10-CM

## 2022-11-09 DIAGNOSIS — M50.30 DDD (DEGENERATIVE DISC DISEASE), CERVICAL: ICD-10-CM

## 2022-11-09 DIAGNOSIS — M50.30 DDD (DEGENERATIVE DISC DISEASE), CERVICAL: Primary | ICD-10-CM

## 2022-11-09 DIAGNOSIS — M79.7 FIBROMYALGIA: ICD-10-CM

## 2022-11-09 DIAGNOSIS — G89.29 CHRONIC MIDLINE LOW BACK PAIN WITHOUT SCIATICA: ICD-10-CM

## 2022-11-09 DIAGNOSIS — E66.01 CLASS 2 SEVERE OBESITY DUE TO EXCESS CALORIES WITH SERIOUS COMORBIDITY AND BODY MASS INDEX (BMI) OF 39.0 TO 39.9 IN ADULT (HCC): ICD-10-CM

## 2022-11-09 DIAGNOSIS — E78.5 HYPERLIPIDEMIA WITH TARGET LDL LESS THAN 100: ICD-10-CM

## 2022-11-09 DIAGNOSIS — F33.42 RECURRENT MAJOR DEPRESSIVE DISORDER, IN FULL REMISSION (HCC): ICD-10-CM

## 2022-11-09 DIAGNOSIS — E53.8 VITAMIN B 12 DEFICIENCY: ICD-10-CM

## 2022-11-09 DIAGNOSIS — B96.89 ACUTE BACTERIAL BRONCHITIS: Primary | ICD-10-CM

## 2022-11-09 DIAGNOSIS — R73.9 HYPERGLYCEMIA: ICD-10-CM

## 2022-11-09 DIAGNOSIS — N32.81 OAB (OVERACTIVE BLADDER): ICD-10-CM

## 2022-11-09 DIAGNOSIS — Z23 NEED FOR VARICELLA VACCINE: ICD-10-CM

## 2022-11-09 LAB — HBA1C MFR BLD: 5.9 %

## 2022-11-09 PROCEDURE — G8427 DOCREV CUR MEDS BY ELIG CLIN: HCPCS | Performed by: FAMILY MEDICINE

## 2022-11-09 PROCEDURE — G8484 FLU IMMUNIZE NO ADMIN: HCPCS | Performed by: FAMILY MEDICINE

## 2022-11-09 PROCEDURE — 1036F TOBACCO NON-USER: CPT | Performed by: FAMILY MEDICINE

## 2022-11-09 PROCEDURE — 83036 HEMOGLOBIN GLYCOSYLATED A1C: CPT | Performed by: FAMILY MEDICINE

## 2022-11-09 PROCEDURE — G8417 CALC BMI ABV UP PARAM F/U: HCPCS | Performed by: FAMILY MEDICINE

## 2022-11-09 PROCEDURE — 3017F COLORECTAL CA SCREEN DOC REV: CPT | Performed by: FAMILY MEDICINE

## 2022-11-09 PROCEDURE — 99214 OFFICE O/P EST MOD 30 MIN: CPT | Performed by: FAMILY MEDICINE

## 2022-11-09 RX ORDER — DOXYCYCLINE HYCLATE 100 MG
100 TABLET ORAL 2 TIMES DAILY
Qty: 10 TABLET | Refills: 0 | Status: SHIPPED | OUTPATIENT
Start: 2022-11-09 | End: 2022-11-14

## 2022-11-09 RX ORDER — GUAIFENESIN 600 MG/1
600 TABLET, EXTENDED RELEASE ORAL 2 TIMES DAILY
Qty: 30 TABLET | Refills: 0 | Status: SHIPPED | OUTPATIENT
Start: 2022-11-09 | End: 2022-11-24

## 2022-11-09 RX ORDER — FLUTICASONE PROPIONATE 50 MCG
1 SPRAY, SUSPENSION (ML) NASAL DAILY
Qty: 16 G | Refills: 1 | Status: SHIPPED | OUTPATIENT
Start: 2022-11-09 | End: 2022-12-09

## 2022-11-09 RX ORDER — ZOSTER VACCINE RECOMBINANT, ADJUVANTED 50 MCG/0.5
0.5 KIT INTRAMUSCULAR ONCE
Qty: 0.5 ML | Refills: 0 | Status: SHIPPED | OUTPATIENT
Start: 2022-11-09 | End: 2022-11-09

## 2022-11-09 SDOH — ECONOMIC STABILITY: FOOD INSECURITY: WITHIN THE PAST 12 MONTHS, YOU WORRIED THAT YOUR FOOD WOULD RUN OUT BEFORE YOU GOT MONEY TO BUY MORE.: NEVER TRUE

## 2022-11-09 SDOH — ECONOMIC STABILITY: FOOD INSECURITY: WITHIN THE PAST 12 MONTHS, THE FOOD YOU BOUGHT JUST DIDN'T LAST AND YOU DIDN'T HAVE MONEY TO GET MORE.: NEVER TRUE

## 2022-11-09 ASSESSMENT — ENCOUNTER SYMPTOMS
SORE THROAT: 1
COUGH: 1

## 2022-11-09 ASSESSMENT — SOCIAL DETERMINANTS OF HEALTH (SDOH): HOW HARD IS IT FOR YOU TO PAY FOR THE VERY BASICS LIKE FOOD, HOUSING, MEDICAL CARE, AND HEATING?: NOT HARD AT ALL

## 2022-11-09 NOTE — LETTER
CONTROLLED SUBSTANCE MEDICATION AGREEMENT     Patient Name: Caro Arizmendi  Patient YOB: 1970   I understand, that controlled substance medications may be used to help better manage my symptoms and to improve my ability to function at home, work and in social settings. However, I also understand that these medications do have risks, which have been discussed with me, including possible development of physical or psychological dependence. I understand that successful treatment requires mutual trust and honesty between me and my provider. I understand and agree that following this Medication Agreement is necessary in continuing my provider-patient relationship and the success of my treatment plan. Explanation from my Provider: Benefits and Goals of Controlled Substance Medications: There are two potential goals for your treatment: (1) decreased pain and suffering (2) improved daily life functions. There are many possible treatments for your chronic condition(s). Alternatives such as physical therapy, yoga, massage, home daily exercise, meditation, relaxation techniques, injections, chiropractic manipulations, surgery, cognitive therapy, hypnosis and many medications that are not habit-forming may be used. Use of controlled substance medications may be helpful, but they are unlikely to resolve all symptoms or restore all function. Explanation from my Provider: Risks of Controlled Substance Medications:  Opioid pain medications: These medications can lead to problems such as addiction/dependence, sedation, lightheadedness/dizziness, memory issues, falls, constipation, nausea, or vomiting. They may also impair the ability to drive or operate machinery. Additionally, these medications may lower testosterone levels, leading to loss of bone strength, stamina and sex drive.   They may cause problems with breathing, sleep apnea and reduced coughing, which is especially dangerous for patients with lung disease. Overdose or dangerous interactions with alcohol and other medications may occur, leading to death. Hyperalgesia may develop, which means patients receiving opioids for the treatment of pain may become more sensitive to certain painful stimuli, and in some cases, experience pain from ordinarily non-painful stimuli. Women between the ages of 14-53 who could become pregnant should carefully weigh the risks and benefits of opioids with their physicians, as these medications increase the risk of pregnancy complications, including miscarriage,  delivery and stillbirth. It is also possible for babies to be born addicted to opioids. Opioid dependence withdrawal symptoms may include; feelings of uneasiness, increased pain, irritability, belly pain, diarrhea, sweats and goose-flesh. Benzodiazepines and non-benzodiazepine sleep medications: These medications can lead to problems such as addiction/dependence, sedation, fatigue, lightheadedness, dizziness, incoordination, falls, depression, hallucinations, and impaired judgment, memory and concentration. The ability to drive and operate machinery may also be affected. Abnormal sleep-related behaviors have been reported, including sleepwalking, driving, making telephone calls, eating, or having sex while not fully awake. These medications can suppress breathing and worsen sleep apnea, particularly when combined with alcohol or other sedating medications, potentially leading to death. Dependence withdrawal symptoms may include tremors, anxiety, hallucinations and seizures. Stimulants:  Common adverse effects include addiction/dependence, increased blood  pressure and heart rate, decreased appetite, nausea, involuntary weight loss, insomnia,                                                                                                                     Initials:_______   irritability, and headaches.   These risks may increase when these medications are combined with other stimulants, such as caffeine pills or energy drinks, certain weight loss supplements and oral decongestants. Dependence withdrawal symptoms may include depressed mood, loss of interest, suicidal thoughts, anxiety, fatigue, appetite changes and agitation. Testosterone replacement therapy:  Potential side effects include increased risk of stroke and heart attack, blood clots, increased blood pressure, increased cholesterol, enlarged prostate, sleep apnea, irritability/aggression and other mood disorders, and decreased fertility. I agree and understand that I and my prescriber have the following rights and responsibilities regarding my treatment plan:     1. MY RIGHTS:  To be informed of my treatment and medication plan. To be an active participant in my health and wellbeing. 2. MY RESPONSIBILITY AND UNDERSTANDING FOR USE OF MEDICATIONS   I will take medications at the dose and frequency as directed. For my safety, I will not increase or change how I take my medications without the recommendation of my healthcare provider.  I will actively participate in any program recommended by my provider which may improve function, including social, physical, psychological programs.  I will not take my medications with alcohol or other drugs not prescribed to me. I understand that drinking alcohol with my medications increases the chances of side effects, including reduced breathing rate and could lead to personal injury when operating machinery.  I understand that if I have a history of substance use disorders, including alcohol or other illicit drugs, that I may be at increased risk of addiction to my medications.  I agree to notify my provider immediately if I should become pregnant so that my treatment plan can be adjusted.    I agree and understand that I shall only receive controlled substance medications from the prescriber that signed this agreement unless there is written agreement among other prescribers of controlled substances outlining the responsibility of the medications being prescribed.  I understand that the if the controlled medication is not helping to achieve goals, the dosage may be tapered and no longer prescribed. 3. MY RESPONSIBILITY FOR COMMUNICATION / PRESCRIPTION RENEWALS   I agree that all controlled substance medications that I take will be prescribed only by my provider. If another healthcare provider prescribes me medication in an emergency, I will notify my provider within seventy-two (72) hours.  I will arrange for refills at the prescribed interval ONLY during regular office hours. I will not ask for refills earlier than agreed, after-hours, on holidays or weekends. Refills may take up to 72 hours for processing and prescriptions to reach the pharmacy.  I will inform my other health care providers that I am taking these medications and of the existence of this [id number]. In the event of an emergency, I will provide the same information to the emergency department prescribers.  I will keep my provider updated on the pharmacy I am using for controlled medication prescription filling. Initials:_______  4. MY RESPONSIBILITY FOR PROTECTING MEDICATIONS   I will protect my prescriptions and medications. I understand that lost or misplaced prescriptions will not be replaced.  I will keep medications only for my own use and will not share them with others. I will keep all medications away from children.  I agree that if my medications are adjusted or discontinued, I will properly dispose of any remaining medications. I understand that I will be required to dispose of any remaining controlled medications as, directed by my prescriber, prior to being provided with any prescriptions for other controlled medications.   Medication drop box locations can be found at: [employer]    5. MY RESPONSIBILITY WITH ILLEGAL DRUGS    I will not use illegal or street drugs or another person's prescription medications not prescribed to me.  If there are identified addiction type symptoms, then referral to a program may be provided by my provider and I agree to follow through with this recommendation. 6. MY RESPONSIBILITY FOR COOPERATION WITH INVESTIGATIONS   I understand that my provider will comply with any applicable law and may discuss my use and/or possible misuse/abuse of controlled substances and alcohol, as appropriate, with any health care provider involved in my care, pharmacist, or legal authority.  I authorize my provider and pharmacy to cooperate fully with law enforcement agencies (as permitted by law) in the investigation of any possible misuse, sale, or other diversion of my controlled substances.  I agree to waive any applicable privilege or right of privacy or confidentiality with respect to these authorizations. 7. PROVIDERS RIGHT TO MONITOR FOR SAFETY: PRESCRIPTION MONITORING / DRUG TESTING   I consent to drug/toxicology screening and will submit to a drug screen upon my providers request to assure I am only taking the prescribed drugs for my safety monitoring. I understand that a drug screen is a laboratory test in which a sample of my urine, blood or saliva is checked to see what drugs I have been taking. This may entail an observed urine specimen, which means that a nurse or other health care provider may watch me provide urine, and I will cooperate if I am asked to provide an observed specimen.  I understand that my provider will check a copy of my State Prescription Monitoring Program () Report in order to safely prescribe medications.  Pill Counts: I consent to pill counts when requested.   I may be asked to bring all my prescribed controlled substance medications, in their original bottles, to all of my scheduled appointments. In addition, my provider may ask me to come to the practice at any time for a random pill count. 8. TERMINATION OF THIS AGREEMENT  For my safety, my prescriber has the right to stop prescribing controlled substance medications and may end this agreement. Initials:_______   Conditions that may result in termination of this agreement:  a. I do not show any improvement in pain, or my activity has not improved. b. I develop rapid tolerance or loss of improvement, as described in my treatment plan.  c. I develop significant side effects from the medication. d. My behavior is not consistent with the responsibilities outlined above, thereby causing safety concerns to continue prescribing controlled substance medications. e. I fail to follow the terms of this agreement. f. Other:____________________________       UNDERSTANDING THIS MEDICATION AGREEMENT:    I have read the above and have had all my questions answered. For chronic disease management, I know that my symptoms can be managed with many types of treatments. A chronic medication trial may be part of my treatment, but I must be an active participant in my care. Medication therapy is only one part of my symptom management plan. In some cases, there may be limited scientific evidence to support the chronic use of certain medications to improve symptoms and daily function. Furthermore, in certain circumstances, there may be scientific information that suggests that the use of chronic controlled substances may worsen my symptoms and increase my risk of unintentional death directly related to this medication therapy. I know that if my provider feels my risk from controlled medications is greater than my benefit, I will have my controlled substance medication(s) compassionately lowered or removed altogether.      I further agree to allow this office to contact my HIPAA contact if there are concerns about my safety and use of the controlled medications. I have agreed to use the prescribed controlled substance medications to me as instructed by my provider and as stated in this Medication Agreement. My initial on each page and my signature below shows that I have read each page and I have had the opportunity to ask questions with answers provided by my provider.     Patient Name (Printed): _____________________________________  Patient Signature:  ______________________   Date: _____________    Prescriber Name (Printed): ___________________________________  Prescriber Signature: _____________________  Date: _____________

## 2022-11-09 NOTE — TELEPHONE ENCOUNTER
Pt called in stating that she was referred for physical therapy and when she contacted them they told her the referral was only good for 30 days and they need a new one submitted. Writer adv PCP did refer her and to contact neuro.  Pt states it is very difficult to get through to them and is inquiring if one could be submitted for her for     M48.02, G99.2 (ICD-10-CM) - Stenosis of cervical spine with myelopathy (HCC)  M54.12 (ICD-10-CM) - Cervical radiculopathy

## 2022-11-09 NOTE — PATIENT INSTRUCTIONS
New Updates for [facility] MyChart/ [employer] ([facility]) [facility]    Thank you for choosing US to give you the best care! [email or web address] ([facility]) is always trying to think of new ways to help their patients. We are asking all patients to try out the new digital registration that is now available through your [employer] account or the new [facility], [employer] ([facility]). Via the delfino you're now able to update your personal and registration information prior to your upcoming appointment. This will save you time once you arrive at the office to check-in, not to mention your information remains safe!! Many other perks come from signing up for an account, such as:  Requesting refills  Scheduling an appointment  Completing an E-Visit  Sending a message to the office/provider  Having access to your medication list  Paying your bill/copay prior to your appointment  Scheduling your yearly mammogram  Review your test results    If you are not familiar with [employer] or the [employer] ([facility]) DELFINO, please ask one of us and we will be happy to answer any questions or help you set-up your account.       Your [facility] office,  Serena

## 2022-11-21 DIAGNOSIS — E55.9 VITAMIN D DEFICIENCY: ICD-10-CM

## 2022-11-21 DIAGNOSIS — K76.0 FATTY LIVER: ICD-10-CM

## 2022-11-21 RX ORDER — CALCIUM CARBONATE/VITAMIN D3 600 MG-10
TABLET ORAL
Qty: 90 CAPSULE | Refills: 0 | Status: SHIPPED | OUTPATIENT
Start: 2022-11-21

## 2022-11-21 RX ORDER — MELATONIN
Qty: 90 TABLET | Refills: 0 | Status: SHIPPED | OUTPATIENT
Start: 2022-11-21

## 2022-12-01 DIAGNOSIS — M50.30 DDD (DEGENERATIVE DISC DISEASE), CERVICAL: ICD-10-CM

## 2022-12-01 DIAGNOSIS — M54.12 CERVICAL RADICULOPATHY: ICD-10-CM

## 2022-12-01 DIAGNOSIS — M79.7 FIBROMYALGIA: ICD-10-CM

## 2022-12-01 RX ORDER — GABAPENTIN 300 MG/1
CAPSULE ORAL
Qty: 90 CAPSULE | Refills: 0 | Status: SHIPPED | OUTPATIENT
Start: 2022-12-01 | End: 2023-01-01

## 2022-12-01 NOTE — TELEPHONE ENCOUNTER
Please Approve or Refuse.   Send to Pharmacy per Pt's Request: [facility]      Next Visit Date:  2/9/2023   Last Visit Date: 11/9/2022    Hemoglobin A1C (%)   Date Value   11/09/2022 5.9   04/08/2019 5.6             ( goal A1C is < 7)   BP Readings from Last 3 Encounters:   11/09/22 119/74   09/26/22 117/77   08/12/22 105/62          (goal 120/80)  BUN   Date Value Ref Range Status   08/23/2021 13 6 - 20 mg/dL Final     Creatinine   Date Value Ref Range Status   08/23/2021 0.87 0.50 - 0.90 mg/dL Final     Potassium   Date Value Ref Range Status   08/23/2021 4.8 3.7 - 5.3 mmol/L Final
(4) completely dependent

## 2022-12-21 ENCOUNTER — HOSPITAL ENCOUNTER (OUTPATIENT)
Dept: PAIN MANAGEMENT | Age: 52
Discharge: HOME | End: 2022-12-21
Payer: MEDICARE

## 2022-12-21 VITALS
TEMPERATURE: 97.3 F | OXYGEN SATURATION: 97 % | SYSTOLIC BLOOD PRESSURE: 137 MMHG | RESPIRATION RATE: 20 BRPM | HEART RATE: 78 BPM | DIASTOLIC BLOOD PRESSURE: 67 MMHG

## 2022-12-21 DIAGNOSIS — M48.02 CERVICAL SPINAL STENOSIS: ICD-10-CM

## 2022-12-21 DIAGNOSIS — M54.12 CERVICAL RADICULOPATHY: Primary | ICD-10-CM

## 2022-12-21 DIAGNOSIS — M50.30 DDD (DEGENERATIVE DISC DISEASE), CERVICAL: ICD-10-CM

## 2022-12-21 PROCEDURE — 99204 OFFICE O/P NEW MOD 45 MIN: CPT | Performed by: STUDENT IN AN ORGANIZED HEALTH CARE EDUCATION/TRAINING PROGRAM

## 2022-12-21 PROCEDURE — 99203 OFFICE O/P NEW LOW 30 MIN: CPT

## 2022-12-21 RX ORDER — GABAPENTIN 400 MG/1
400 CAPSULE ORAL 3 TIMES DAILY
Qty: 90 CAPSULE | Refills: 2 | Status: SHIPPED | OUTPATIENT
Start: 2022-12-21 | End: 2023-03-21
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
## 2023-01-25 ENCOUNTER — TELEPHONE (OUTPATIENT)
Dept: FAMILY MEDICINE CLINIC | Age: 53
End: 2023-01-25

## 2023-01-25 NOTE — TELEPHONE ENCOUNTER
CALLED TO SCHEDULE MAMMOGRAM WITH PATIENT/ STATES WILL BE GOING TO [facility] AND WILL CALL TO SCHEDULE THIS

## 2023-01-27 DIAGNOSIS — M54.12 CERVICAL RADICULOPATHY: ICD-10-CM

## 2023-01-27 RX ORDER — NAPROXEN 500 MG/1
500 TABLET ORAL 2 TIMES DAILY WITH MEALS
Qty: 180 TABLET | Refills: 1 | Status: SHIPPED | OUTPATIENT
Start: 2023-01-27

## 2023-01-27 NOTE — TELEPHONE ENCOUNTER
Please Approve or Refuse.   Send to Pharmacy per Pt's Request:      Next Visit Date:  2/9/2023   Last Visit Date: 11/9/2022    Hemoglobin A1C (%)   Date Value   11/09/2022 5.9   04/08/2019 5.6             ( goal A1C is < 7)   BP Readings from Last 3 Encounters:   12/21/22 137/67   11/09/22 119/74   09/26/22 117/77          (goal 120/80)  BUN   Date Value Ref Range Status   08/23/2021 13 6 - 20 mg/dL Final     Creatinine   Date Value Ref Range Status   08/23/2021 0.87 0.50 - 0.90 mg/dL Final     Potassium   Date Value Ref Range Status   08/23/2021 4.8 3.7 - 5.3 mmol/L Final

## 2023-03-07 RX ORDER — GABAPENTIN 400 MG/1
CAPSULE ORAL
Qty: 90 CAPSULE | Refills: 2 | Status: SHIPPED | OUTPATIENT
Start: 2023-03-07 | End: 2023-04-06

## 2023-03-13 DIAGNOSIS — E55.9 VITAMIN D DEFICIENCY: ICD-10-CM

## 2023-03-13 RX ORDER — MELATONIN
Qty: 90 TABLET | Refills: 3 | Status: SHIPPED | OUTPATIENT
Start: 2023-03-13

## 2023-03-13 RX ORDER — MAGNESIUM OXIDE 400 MG/1
400 TABLET ORAL DAILY
Qty: 90 TABLET | Refills: 3 | Status: SHIPPED | OUTPATIENT
Start: 2023-03-13

## 2023-03-13 NOTE — TELEPHONE ENCOUNTER
Please Approve or Refuse.   Send to Pharmacy per Pt's Request:      Next Visit Date:  3/20/2023   Last Visit Date: 11/9/2022    Hemoglobin A1C (%)   Date Value   11/09/2022 5.9   04/08/2019 5.6             ( goal A1C is < 7)   BP Readings from Last 3 Encounters:   12/21/22 137/67   11/09/22 119/74   09/26/22 117/77          (goal 120/80)  BUN   Date Value Ref Range Status   08/23/2021 13 6 - 20 mg/dL Final     Creatinine   Date Value Ref Range Status   08/23/2021 0.87 0.50 - 0.90 mg/dL Final     Potassium   Date Value Ref Range Status   08/23/2021 4.8 3.7 - 5.3 mmol/L Final

## 2023-03-20 ENCOUNTER — TELEPHONE (OUTPATIENT)
Dept: FAMILY MEDICINE CLINIC | Age: 53
End: 2023-03-20

## 2023-03-20 NOTE — TELEPHONE ENCOUNTER
Pt no showed for the 3rd time within the past 365 days. Pt no showed her NTP appt today with Dr. Juli Martinez to [facility].      No Show #1 - 5/24/2022 w/ Melissa  No Show #2 - 9/23/2022 w/Dr. Mar Left  No Show #3 - 3/20/2023 - NTP w/ Dr. Juli Martinez

## 2023-03-29 LAB — MAMMOGRAPHY, EXTERNAL: NORMAL

## 2024-01-31 ENCOUNTER — OFFICE VISIT (OUTPATIENT)
Dept: NEUROSURGERY | Age: 54
End: 2024-01-31
Payer: MEDICAID

## 2024-01-31 VITALS
DIASTOLIC BLOOD PRESSURE: 71 MMHG | WEIGHT: 225 LBS | HEIGHT: 63 IN | SYSTOLIC BLOOD PRESSURE: 112 MMHG | OXYGEN SATURATION: 97 % | BODY MASS INDEX: 39.87 KG/M2 | HEART RATE: 88 BPM

## 2024-01-31 DIAGNOSIS — M50.10 HERNIATION OF CERVICAL INTERVERTEBRAL DISC WITH RADICULOPATHY: Primary | ICD-10-CM

## 2024-01-31 DIAGNOSIS — M51.16 LUMBAR DISC DISEASE WITH RADICULOPATHY: ICD-10-CM

## 2024-01-31 PROCEDURE — 99204 OFFICE O/P NEW MOD 45 MIN: CPT | Performed by: NEUROLOGICAL SURGERY

## 2024-01-31 RX ORDER — MELOXICAM 7.5 MG/1
TABLET ORAL
COMMUNITY
Start: 2023-12-13

## 2024-01-31 RX ORDER — PREGABALIN 150 MG/1
CAPSULE ORAL
COMMUNITY

## 2024-01-31 RX ORDER — DULAGLUTIDE 0.75 MG/.5ML
INJECTION, SOLUTION SUBCUTANEOUS
COMMUNITY

## 2024-01-31 RX ORDER — TRAMADOL HYDROCHLORIDE 50 MG/1
50 TABLET ORAL EVERY 8 HOURS PRN
COMMUNITY

## 2024-01-31 NOTE — PROGRESS NOTES
[facility] NEUROSURGERY CENTER [place]  [address]  [id number]  [address] - GROUND FLOOR, [address]  [facility] [address]  Dept: [phone]    Patient:  Kim Webb  YOB: 1970  Date: 1/31/24    The patient is a 53 y.o. female who presents today for consult of the following problems:     Chief Complaint   Patient presents with    Back Pain    Neck Pain             HPI:     Kim Webb is a 53 y.o. female on whom neurosurgical consultation was requested by Meenu Mueller DO for management of cervical discomfort along with radiation on the left upper extremity that has been progressively worsening and radiates into approximate C7 dermatome distribution involving the index finger and middle finger.  Pain is persistent around-the-clock with some exacerbation with changes in motion and neck position with now recently intermittent symptoms along the right upper extremity as well.  She has been on multiple pharmacotherapeutic measures and takes approximately tramadol 50 mg once a day and rarely twice a day.  In addition she is on Lyrica 150 mg 3 times a day and has been off of Neurontin for some time and rarely if ever takes the muscle relaxant.  She has been through physical therapy extensively both previously when I saw her in 2022 as well as currently.  Had seen pain management and orthopedic surgery evaluation of rotator cuff and epidural injection.  She has been seeing pain management remotely did have cervical epidural injection without significant improvement.  Most recently has had lumbar epidurals    Patient also with significant spasmodic bilateral paraspinal achy pain 7-10 out of 10 without any specific positional correlation with intermittent radiation down the lower extremities.  Radiation does appear to occur approximate L4 dermatomal distribution along the anterior aspect of the thigh into the anterior aspect along

## 2024-02-20 ENCOUNTER — HOSPITAL ENCOUNTER (OUTPATIENT)
Dept: MRI IMAGING | Age: 54
Discharge: HOME | End: 2024-02-22
Attending: NEUROLOGICAL SURGERY
Payer: MEDICAID

## 2024-02-20 DIAGNOSIS — M50.10 HERNIATION OF CERVICAL INTERVERTEBRAL DISC WITH RADICULOPATHY: ICD-10-CM

## 2024-02-20 DIAGNOSIS — M51.16 LUMBAR DISC DISEASE WITH RADICULOPATHY: ICD-10-CM

## 2024-02-20 PROCEDURE — 72148 MRI LUMBAR SPINE W/O DYE: CPT

## 2024-02-20 PROCEDURE — 72141 MRI NECK SPINE W/O DYE: CPT

## 2024-03-18 ENCOUNTER — OFFICE VISIT (OUTPATIENT)
Dept: NEUROSURGERY | Age: 54
End: 2024-03-18
Payer: MEDICAID

## 2024-03-18 VITALS
BODY MASS INDEX: 36.86 KG/M2 | WEIGHT: 208 LBS | HEART RATE: 69 BPM | DIASTOLIC BLOOD PRESSURE: 75 MMHG | HEIGHT: 63 IN | SYSTOLIC BLOOD PRESSURE: 110 MMHG

## 2024-03-18 DIAGNOSIS — M51.16 LUMBAR DISC DISEASE WITH RADICULOPATHY: ICD-10-CM

## 2024-03-18 DIAGNOSIS — M50.10 HERNIATION OF CERVICAL INTERVERTEBRAL DISC WITH RADICULOPATHY: Primary | ICD-10-CM

## 2024-03-18 PROCEDURE — 99213 OFFICE O/P EST LOW 20 MIN: CPT | Performed by: NEUROLOGICAL SURGERY

## 2024-03-19 RX ORDER — SODIUM CHLORIDE, SODIUM LACTATE, POTASSIUM CHLORIDE, CALCIUM CHLORIDE 600; 310; 30; 20 MG/100ML; MG/100ML; MG/100ML; MG/100ML
INJECTION, SOLUTION INTRAVENOUS CONTINUOUS
Status: CANCELLED | OUTPATIENT
Start: 2024-03-19

## 2024-03-19 NOTE — DISCHARGE INSTRUCTIONS
Oxaprozin (Daypro), Piroxicam (Feldene), Excedrin (has aspirin in it)    Herbals/supplements: Bromelain, Cinnamon, Cayenne Pepper, Dong quai (female ginseng), Fish oil, Garlic, Ginger,Ginkgo biloba, Grape seed extract, Turmeric, Vitamin E, etc....)    You may continue the rest of your medications through the night before surgery unless instructed otherwise.    PLEASE NOTE:  THE ABOVE (IF ANY) DISCONTINUED MEDS MAY ONLY BE FROM   \"CLEANING UP\" THE MED LIST AND WERE NOT ACTUALLY CANCELLED; SEE CHART FOR DETAILS AND ALWAYS CHECK WITH PRESCRIBING PROVIDER BEFORE DISCONTINUING ANY MEDICATIONS          ___________________  _______________________  Signature (Provider)              Signature (Patient)         Day of Surgery/Procedure    As a patient at [facility] you can expect quality medical and nursing care that is centered on your individual needs.  Our goal is to make your surgical experience as comfortable as possible    Directions to the Surgery Center  The surgery Center at [facility] is located in the Emergency Room parking lot on [address] or there is additional parking across the street. The address is [address] [place] [address]. Please check in at the Surgery Center upon arrival.     Patient Instructions  In case of any illness please contact your surgeons office for instructions prior to coming to the hospital.    Due to current restrictions you are only allowed 2 adults to be with you. Masks are to be worn and screening will take place on arrival.     Bring your current list of medications, vitamins, herbals and anything you might take on an  \"as needed \" basis.  It is important we have a correct list with dosages and frequencies. Please verify your list with your medications at home or bring all of your bottles with you.    If you have been given a blood band be sure to bring it with you on the day of surgery. Do not put it on or close the clasp.    Use and bring any

## 2024-03-20 ENCOUNTER — HOSPITAL ENCOUNTER (OUTPATIENT)
Dept: PREADMISSION TESTING | Age: 54
Discharge: HOME | End: 2024-03-20

## 2024-03-21 ENCOUNTER — HOSPITAL ENCOUNTER (OUTPATIENT)
Dept: PREADMISSION TESTING | Age: 54
Discharge: HOME | End: 2024-03-21
Payer: MEDICAID

## 2024-03-21 VITALS
HEIGHT: 63 IN | WEIGHT: 206 LBS | SYSTOLIC BLOOD PRESSURE: 103 MMHG | BODY MASS INDEX: 36.5 KG/M2 | DIASTOLIC BLOOD PRESSURE: 73 MMHG | TEMPERATURE: 97.1 F | OXYGEN SATURATION: 96 % | RESPIRATION RATE: 16 BRPM | HEART RATE: 61 BPM

## 2024-03-21 LAB
ABO + RH BLD: NORMAL
ANION GAP SERPL CALCULATED.3IONS-SCNC: 11 MMOL/L (ref 9–16)
ARM BAND NUMBER: NORMAL
BACTERIA URNS QL MICRO: NORMAL
BILIRUB UR QL STRIP: NEGATIVE
BLOOD BANK SAMPLE EXPIRATION: NORMAL
BLOOD GROUP ANTIBODIES SERPL: NEGATIVE
BUN SERPL-MCNC: 9 MG/DL (ref 6–20)
CASTS #/AREA URNS LPF: NORMAL /LPF (ref 0–8)
CHLORIDE SERPL-SCNC: 105 MMOL/L (ref 98–107)
CLARITY UR: ABNORMAL
CO2 SERPL-SCNC: 24 MMOL/L (ref 20–31)
COLOR UR: YELLOW
CREAT SERPL-MCNC: 1.1 MG/DL (ref 0.5–0.9)
EPI CELLS #/AREA URNS HPF: NORMAL /HPF (ref 0–5)
ERYTHROCYTE [DISTWIDTH] IN BLOOD BY AUTOMATED COUNT: 12.8 % (ref 11.8–14.4)
GFR SERPL CREATININE-BSD FRML MDRD: >60 ML/MIN/1.73M2
GLUCOSE SERPL-MCNC: 102 MG/DL (ref 74–99)
GLUCOSE UR STRIP-MCNC: NEGATIVE MG/DL
HCT VFR BLD AUTO: 42.6 % (ref 36.3–47.1)
HGB BLD-MCNC: 14.1 G/DL (ref 11.9–15.1)
HGB UR QL STRIP.AUTO: ABNORMAL
KETONES UR STRIP-MCNC: NEGATIVE MG/DL
LEUKOCYTE ESTERASE UR QL STRIP: NEGATIVE
MCH RBC QN AUTO: 30.2 PG (ref 25.2–33.5)
MCHC RBC AUTO-ENTMCNC: 33.1 G/DL (ref 28.4–34.8)
MCV RBC AUTO: 91.2 FL (ref 82.6–102.9)
NITRITE UR QL STRIP: NEGATIVE
NRBC BLD-RTO: 0 PER 100 WBC
PH UR STRIP: 5.5 [PH] (ref 5–8)
PLATELET # BLD AUTO: 188 K/UL (ref 138–453)
PMV BLD AUTO: 11.6 FL (ref 8.1–13.5)
POTASSIUM SERPL-SCNC: 4.3 MMOL/L (ref 3.7–5.3)
PROT UR STRIP-MCNC: NEGATIVE MG/DL
RBC # BLD AUTO: 4.67 M/UL (ref 3.95–5.11)
RBC #/AREA URNS HPF: NORMAL /HPF (ref 0–4)
SODIUM SERPL-SCNC: 140 MMOL/L (ref 136–145)
SP GR UR STRIP: 1.02 (ref 1–1.03)
UROBILINOGEN UR STRIP-ACNC: NORMAL EU/DL (ref 0–1)
WBC #/AREA URNS HPF: NORMAL /HPF (ref 0–5)
WBC OTHER # BLD: 5.9 K/UL (ref 3.5–11.3)

## 2024-03-21 PROCEDURE — 80051 ELECTROLYTE PANEL: CPT

## 2024-03-21 PROCEDURE — 81001 URINALYSIS AUTO W/SCOPE: CPT

## 2024-03-21 PROCEDURE — 86900 BLOOD TYPING SEROLOGIC ABO: CPT

## 2024-03-21 PROCEDURE — 36415 COLL VENOUS BLD VENIPUNCTURE: CPT

## 2024-03-21 PROCEDURE — 86901 BLOOD TYPING SEROLOGIC RH(D): CPT

## 2024-03-21 PROCEDURE — 85027 COMPLETE CBC AUTOMATED: CPT

## 2024-03-21 PROCEDURE — 86850 RBC ANTIBODY SCREEN: CPT

## 2024-03-21 PROCEDURE — 84520 ASSAY OF UREA NITROGEN: CPT

## 2024-03-21 PROCEDURE — 82947 ASSAY GLUCOSE BLOOD QUANT: CPT

## 2024-03-21 PROCEDURE — 82565 ASSAY OF CREATININE: CPT

## 2024-03-21 PROCEDURE — 93005 ELECTROCARDIOGRAM TRACING: CPT | Performed by: ANESTHESIOLOGY

## 2024-03-21 RX ORDER — SERTRALINE HYDROCHLORIDE 100 MG/1
100 TABLET, FILM COATED ORAL NIGHTLY
COMMUNITY
Start: 2024-01-21

## 2024-03-21 ASSESSMENT — PAIN DESCRIPTION - ORIENTATION: ORIENTATION: RIGHT;LEFT

## 2024-03-21 ASSESSMENT — PAIN DESCRIPTION - LOCATION: LOCATION: HAND

## 2024-03-21 ASSESSMENT — PAIN DESCRIPTION - DESCRIPTORS: DESCRIPTORS: CRAMPING

## 2024-03-21 ASSESSMENT — PAIN SCALES - GENERAL: PAINLEVEL_OUTOF10: 8

## 2024-03-21 NOTE — H&P (VIEW-ONLY)
History and Physical    Pt Name: Kim Webb  MRN: [id number]  YOB: 1970  Date of evaluation: 3/21/2024    SUBJECTIVE:   History of Chief Complaint:    Patient presents for PAT appointment. She reports bilateral upper extremity pain, intermittent lower extremity pain.  She says that initially it was mostly her left side but now is bilateral.  She reports sharp/shooting pains as well.  Patient has been following with pain management, injections no longer are helping.  She has had lumbar spine surgery in the past, when she was in her 30s.   She has been scheduled for C6-C7 ANTERIOR CERVICAL DISCECTOMY/ DECOMPRESSION FUSION.   Past Medical History    has a past medical history of Abnormal brain MRI, Anxiety, Arthritis, Chronic back pain, Depression, Environmental allergies, Fatty liver, Fibromyalgia, Headache(784.0), Herniation of cervical intervertebral disc with radiculopathy, High cholesterol, Palpitations, Pre-diabetes, Under care of team, Under care of team, Under care of team, and Wears glasses.  Past Surgical History   has a past surgical history that includes Appendectomy; Tubal ligation; Lumbar spine surgery; Cystoscopy (N/A, 2019); Colonoscopy; and  section.  Medications  Prior to Admission medications    Medication Sig Start Date End Date Taking? Authorizing Provider   sertraline (ZOLOFT) 100 MG tablet Take 1 tablet by mouth at bedtime 24  Yes Seth Castillo MD   traMADol (ULTRAM) 50 MG tablet Take 1 tablet by mouth every 8 hours as needed for Pain.    Seth Castillo MD   pregabalin (LYRICA) 150 MG capsule Take 1 capsule by mouth daily.    Seth Castillo MD   meloxicam (MOBIC) 7.5 MG tablet Take 1 tablet by mouth daily 23   Seth Castillo MD   TRULICITY 0.75 MG/0.5ML SOPN ADMINISTER 0.75 MG UNDER THE SKIN EVERY 7 DAYS    Seth Castillo MD   vitamin D3 (CHOLECALCIFEROL) 25 MCG (1000 UT) TABS tablet TAKE 1 TABLET BY MOUTH DAILY    HEENT:   negative for tinnitus, epistaxis and sore throat     RESPIRATORY:   negative for cough, shortness of breath, wheezing     CARDIOVASCULAR:   negative for chest pain, palpitations, syncope, edema     GASTROINTESTINAL:   negative for nausea, vomiting     GENITOURINARY/RENAL:   negative for incontinence     MUSCULOSKELETAL:   See HPI   NEUROLOGICAL:   Negative for headaches, dizziness, weakness and tingling    PSYCHIATRIC:   negative for anxiety         OBJECTIVE:   VITALS:  height is 1.6 m (5' 3\") and weight is 93.4 kg (206 lb). Her temporal temperature is 97.1 °F (36.2 °C). Her blood pressure is 103/73 and her pulse is 61. Her respiration is 16 and oxygen saturation is 96%.   CONSTITUTIONAL:alert & cooperative, no acute distress.  Very pleasant and talkative.  SKIN:  Brief inspection of skin, Warm and dry, no rashes on exposed areas of skin   HEAD:  Normocephalic, atraumatic   EYES: EOMs intact.    EARS:  Hearing grossly WNL.    NOSE:  Nares patent.  No rhinorrhea   MOUTH/THROAT:  benign  NECK:decreased/restricted ROM   LUNGS: Clear to auscultation bilaterally, no wheezes.  CARDIOVASCULAR: Heart sounds are normal.  Regular rate and rhythm without murmur.   ABDOMEN: non distended.  EXTREMITIES: no edema bilateral lower extremities.    Testing:   EK24  Labs pending: drawn 3/21/2024     IMPRESSIONS:   Cervical disc disease   has a past medical history of Abnormal brain MRI, Anxiety, Arthritis, Chronic back pain, Depression, Environmental allergies, Fatty liver (03/10/2021), Fibromyalgia, Headache(784.0), Herniation of cervical intervertebral disc with radiculopathy, High cholesterol, Palpitations, Pre-diabetes, Under care of team (2024), Under care of team (2024), Under care of team, and Wears glasses.   PLANS:   C6-C7 ANTERIOR CERVICAL DISCECTOMY/ DECOMPRESSION FUSION     ALBERTA HATHAWAY PA-C  Electronically signed 3/21/2024 at 12:51 PM

## 2024-03-21 NOTE — PROGRESS NOTES
Anesthesia Focused Assessment      STOP-BANG Sleep Apnea Questionnaire    SNORE loudly (heard through closed doors)?   No  TIRED, fatigued, sleepy during daytime?    No  OBSERVED stopping breathing during sleep?   No  High blood PRESSURE being treated?    No    BMI over 35?        Yes  AGE over 50?        Yes  NECK circumference over 16\"?     No  GENDER (male)?       No             Total 2  High risk 5-8  Intermediate risk 3-4  Low risk 0-2    Obstructive Sleep Apnea: denies  If YES, machine used: no     Type 1 DM:   no  T2DM:  prediabetes, on trulicity    Coronary Artery Disease:  denies  Hypertension:  no    Active smoker:  no  Drinks alcohol:  rarely  Recreational drugs: no    Dentition: molar crown x 2    Defib / AICD / Pacemaker: no      Renal Failure/dialysis:  no    Patient was evaluated in PAT & anesthesia guidelines were applied.   NPO guidelines, medication instructions and scheduled arrival time were reviewed with patient.  I advised patient to please contact the surgeon's office, ahead of time if possible, if any new signs or symptoms of illness, infection, rash, etc    Hx of anesthesia complications:  no  Family hx of anesthesia complications:  mom - prolonged emergence from general anesthesia.                                                                                                                     Patient is without cardiac or pulmonary complaint. She has \"low risk\" stress test on file from 2023.    ALBERTA HATHAWAY PA-C  3/21/24  12:29 PM

## 2024-03-21 NOTE — DISCHARGE INSTRUCTIONS
for at home on the day of surgery.  Other children are not permitted in recovery room and we want you to be able to spend time with the patient.  If other arrangements are not available then we suggest that you have a second adult to stay in the waiting room.      If you have any other questions regarding your procedure or the day of surgery, please call [phone], or [phone]

## 2024-03-21 NOTE — H&P
History and Physical    Pt Name: Kim Webb  MRN: [id number]  YOB: 1970  Date of evaluation: 3/21/2024    SUBJECTIVE:   History of Chief Complaint:    Patient presents for PAT appointment. She reports bilateral upper extremity pain, intermittent lower extremity pain.  She says that initially it was mostly her left side but now is bilateral.  She reports sharp/shooting pains as well.  Patient has been following with pain management, injections no longer are helping.  She has had lumbar spine surgery in the past, when she was in her 30s.   She has been scheduled for C6-C7 ANTERIOR CERVICAL DISCECTOMY/ DECOMPRESSION FUSION.   Past Medical History    has a past medical history of Abnormal brain MRI, Anxiety, Arthritis, Chronic back pain, Depression, Environmental allergies, Fatty liver, Fibromyalgia, Headache(784.0), Herniation of cervical intervertebral disc with radiculopathy, High cholesterol, Palpitations, Pre-diabetes, Under care of team, Under care of team, Under care of team, and Wears glasses.  Past Surgical History   has a past surgical history that includes Appendectomy; Tubal ligation; Lumbar spine surgery; Cystoscopy (N/A, 2019); Colonoscopy; and  section.  Medications  Prior to Admission medications    Medication Sig Start Date End Date Taking? Authorizing Provider   sertraline (ZOLOFT) 100 MG tablet Take 1 tablet by mouth at bedtime 24  Yes Seth Castillo MD   traMADol (ULTRAM) 50 MG tablet Take 1 tablet by mouth every 8 hours as needed for Pain.    Seth Castillo MD   pregabalin (LYRICA) 150 MG capsule Take 1 capsule by mouth daily.    Seth Castillo MD   meloxicam (MOBIC) 7.5 MG tablet Take 1 tablet by mouth daily 23   Seth Castillo MD   TRULICITY 0.75 MG/0.5ML SOPN ADMINISTER 0.75 MG UNDER THE SKIN EVERY 7 DAYS    Seth Castillo MD   vitamin D3 (CHOLECALCIFEROL) 25 MCG (1000 UT) TABS tablet TAKE 1 TABLET BY MOUTH DAILY

## 2024-03-22 LAB
EKG ATRIAL RATE: 57 BPM
EKG P AXIS: 63 DEGREES
EKG P-R INTERVAL: 130 MS
EKG Q-T INTERVAL: 418 MS
EKG QRS DURATION: 80 MS
EKG QTC CALCULATION (BAZETT): 406 MS
EKG R AXIS: 74 DEGREES
EKG T AXIS: 38 DEGREES
EKG VENTRICULAR RATE: 57 BPM

## 2024-03-22 PROCEDURE — 93010 ELECTROCARDIOGRAM REPORT: CPT | Performed by: INTERNAL MEDICINE

## 2024-03-29 ENCOUNTER — ANESTHESIA EVENT (OUTPATIENT)
Dept: OPERATING ROOM | Age: 54
End: 2024-03-29
Payer: MEDICAID

## 2024-03-29 ENCOUNTER — HOSPITAL ENCOUNTER (INPATIENT)
Age: 54
LOS: 1 days | Discharge: HOME | DRG: 321 | End: 2024-03-30
Attending: NEUROLOGICAL SURGERY | Admitting: NEUROLOGICAL SURGERY
Payer: MEDICAID

## 2024-03-29 ENCOUNTER — ANESTHESIA (OUTPATIENT)
Dept: OPERATING ROOM | Age: 54
End: 2024-03-29
Payer: MEDICAID

## 2024-03-29 ENCOUNTER — APPOINTMENT (OUTPATIENT)
Dept: GENERAL RADIOLOGY | Age: 54
DRG: 321 | End: 2024-03-29
Attending: NEUROLOGICAL SURGERY
Payer: MEDICAID

## 2024-03-29 DIAGNOSIS — G89.18 ACUTE POST-OPERATIVE PAIN: Primary | ICD-10-CM

## 2024-03-29 PROBLEM — M50.90 CERVICAL DISC DISEASE: Status: ACTIVE | Noted: 2024-03-29

## 2024-03-29 PROBLEM — M47.22 CERVICAL SPONDYLOSIS WITH RADICULOPATHY: Status: ACTIVE | Noted: 2022-06-21

## 2024-03-29 LAB
ABO + RH BLD: NORMAL
ABO + RH BLD: NORMAL
ARM BAND NUMBER: NORMAL
ARM BAND NUMBER: NORMAL
BLOOD BANK SAMPLE EXPIRATION: NORMAL
BLOOD BANK SAMPLE EXPIRATION: NORMAL
BLOOD GROUP ANTIBODIES SERPL: NEGATIVE
BLOOD GROUP ANTIBODIES SERPL: NEGATIVE

## 2024-03-29 PROCEDURE — 2580000003 HC RX 258: Performed by: ANESTHESIOLOGY

## 2024-03-29 PROCEDURE — 86901 BLOOD TYPING SEROLOGIC RH(D): CPT

## 2024-03-29 PROCEDURE — 2709999900 HC NON-CHARGEABLE SUPPLY: Performed by: NEUROLOGICAL SURGERY

## 2024-03-29 PROCEDURE — 7100000000 HC PACU RECOVERY - FIRST 15 MIN: Performed by: NEUROLOGICAL SURGERY

## 2024-03-29 PROCEDURE — 22853 INSJ BIOMECHANICAL DEVICE: CPT | Performed by: NEUROLOGICAL SURGERY

## 2024-03-29 PROCEDURE — 6360000002 HC RX W HCPCS: Performed by: NEUROLOGICAL SURGERY

## 2024-03-29 PROCEDURE — 22551 ARTHRD ANT NTRBDY CERVICAL: CPT | Performed by: NEUROLOGICAL SURGERY

## 2024-03-29 PROCEDURE — 3700000000 HC ANESTHESIA ATTENDED CARE: Performed by: NEUROLOGICAL SURGERY

## 2024-03-29 PROCEDURE — APPSS15 APP SPLIT SHARED TIME 0-15 MINUTES: Performed by: NURSE PRACTITIONER

## 2024-03-29 PROCEDURE — 1200000000 HC SEMI PRIVATE

## 2024-03-29 PROCEDURE — 0RB30ZZ EXCISION OF CERVICAL VERTEBRAL DISC, OPEN APPROACH: ICD-10-PCS | Performed by: NEUROLOGICAL SURGERY

## 2024-03-29 PROCEDURE — 0RG10AJ FUSION OF CERVICAL VERTEBRAL JOINT WITH INTERBODY FUSION DEVICE, POSTERIOR APPROACH, ANTERIOR COLUMN, OPEN APPROACH: ICD-10-PCS | Performed by: NEUROLOGICAL SURGERY

## 2024-03-29 PROCEDURE — 3700000001 HC ADD 15 MINUTES (ANESTHESIA): Performed by: NEUROLOGICAL SURGERY

## 2024-03-29 PROCEDURE — 3600000004 HC SURGERY LEVEL 4 BASE: Performed by: NEUROLOGICAL SURGERY

## 2024-03-29 PROCEDURE — 86900 BLOOD TYPING SEROLOGIC ABO: CPT

## 2024-03-29 PROCEDURE — 2500000003 HC RX 250 WO HCPCS: Performed by: NURSE ANESTHETIST, CERTIFIED REGISTERED

## 2024-03-29 PROCEDURE — 22845 INSERT SPINE FIXATION DEVICE: CPT | Performed by: NEUROLOGICAL SURGERY

## 2024-03-29 PROCEDURE — C1713 ANCHOR/SCREW BN/BN,TIS/BN: HCPCS | Performed by: NEUROLOGICAL SURGERY

## 2024-03-29 PROCEDURE — 2720000010 HC SURG SUPPLY STERILE: Performed by: NEUROLOGICAL SURGERY

## 2024-03-29 PROCEDURE — 6370000000 HC RX 637 (ALT 250 FOR IP): Performed by: NURSE PRACTITIONER

## 2024-03-29 PROCEDURE — 6370000000 HC RX 637 (ALT 250 FOR IP): Performed by: NEUROLOGICAL SURGERY

## 2024-03-29 PROCEDURE — 7100000001 HC PACU RECOVERY - ADDTL 15 MIN: Performed by: NEUROLOGICAL SURGERY

## 2024-03-29 PROCEDURE — 6360000002 HC RX W HCPCS: Performed by: NURSE PRACTITIONER

## 2024-03-29 PROCEDURE — 86850 RBC ANTIBODY SCREEN: CPT

## 2024-03-29 PROCEDURE — 6360000002 HC RX W HCPCS: Performed by: NURSE ANESTHETIST, CERTIFIED REGISTERED

## 2024-03-29 PROCEDURE — 2580000003 HC RX 258: Performed by: NURSE ANESTHETIST, CERTIFIED REGISTERED

## 2024-03-29 PROCEDURE — C1889 IMPLANT/INSERT DEVICE, NOC: HCPCS | Performed by: NEUROLOGICAL SURGERY

## 2024-03-29 PROCEDURE — 2580000003 HC RX 258: Performed by: NEUROLOGICAL SURGERY

## 2024-03-29 PROCEDURE — 3600000014 HC SURGERY LEVEL 4 ADDTL 15MIN: Performed by: NEUROLOGICAL SURGERY

## 2024-03-29 PROCEDURE — 2500000003 HC RX 250 WO HCPCS: Performed by: NEUROLOGICAL SURGERY

## 2024-03-29 DEVICE — IMPLANTABLE DEVICE: Type: IMPLANTABLE DEVICE | Status: FUNCTIONAL

## 2024-03-29 DEVICE — SCREW SPNL L 14 MM DIA 3.5 MM TI ANTR CERV SELF DRILLING VA: Type: IMPLANTABLE DEVICE | Status: FUNCTIONAL

## 2024-03-29 DEVICE — PLATE SPNL L 10 MM TI ANTR CERV 1 LEVEL NONSTERILE CODA: Type: IMPLANTABLE DEVICE | Status: FUNCTIONAL

## 2024-03-29 DEVICE — GRAFT BNE SUB SM 1ML CRYOPRESERVED VIABLE CORT CANC BNE: Type: IMPLANTABLE DEVICE | Status: FUNCTIONAL

## 2024-03-29 RX ORDER — OXYCODONE HYDROCHLORIDE 5 MG/1
10 TABLET ORAL EVERY 4 HOURS PRN
Status: DISCONTINUED | OUTPATIENT
Start: 2024-03-29 | End: 2024-03-30 | Stop reason: HOSPADM

## 2024-03-29 RX ORDER — NALOXONE HYDROCHLORIDE 0.4 MG/ML
INJECTION, SOLUTION INTRAMUSCULAR; INTRAVENOUS; SUBCUTANEOUS PRN
Status: DISCONTINUED | OUTPATIENT
Start: 2024-03-29 | End: 2024-03-29 | Stop reason: HOSPADM

## 2024-03-29 RX ORDER — LIDOCAINE HYDROCHLORIDE 10 MG/ML
INJECTION, SOLUTION EPIDURAL; INFILTRATION; INTRACAUDAL; PERINEURAL PRN
Status: DISCONTINUED | OUTPATIENT
Start: 2024-03-29 | End: 2024-03-29 | Stop reason: SDUPTHER

## 2024-03-29 RX ORDER — ONDANSETRON 2 MG/ML
4 INJECTION INTRAMUSCULAR; INTRAVENOUS EVERY 6 HOURS PRN
Status: DISCONTINUED | OUTPATIENT
Start: 2024-03-29 | End: 2024-03-30 | Stop reason: HOSPADM

## 2024-03-29 RX ORDER — ONDANSETRON 4 MG/1
4 TABLET, ORALLY DISINTEGRATING ORAL EVERY 8 HOURS PRN
Status: DISCONTINUED | OUTPATIENT
Start: 2024-03-29 | End: 2024-03-30 | Stop reason: HOSPADM

## 2024-03-29 RX ORDER — ROCURONIUM BROMIDE 10 MG/ML
INJECTION, SOLUTION INTRAVENOUS PRN
Status: DISCONTINUED | OUTPATIENT
Start: 2024-03-29 | End: 2024-03-29 | Stop reason: SDUPTHER

## 2024-03-29 RX ORDER — FENTANYL CITRATE 50 UG/ML
50 INJECTION, SOLUTION INTRAMUSCULAR; INTRAVENOUS EVERY 5 MIN PRN
Status: DISCONTINUED | OUTPATIENT
Start: 2024-03-29 | End: 2024-03-29 | Stop reason: HOSPADM

## 2024-03-29 RX ORDER — LIDOCAINE HYDROCHLORIDE AND EPINEPHRINE 10; 10 MG/ML; UG/ML
INJECTION, SOLUTION INFILTRATION; PERINEURAL PRN
Status: DISCONTINUED | OUTPATIENT
Start: 2024-03-29 | End: 2024-03-29 | Stop reason: ALTCHOICE

## 2024-03-29 RX ORDER — PROPOFOL 10 MG/ML
INJECTION, EMULSION INTRAVENOUS PRN
Status: DISCONTINUED | OUTPATIENT
Start: 2024-03-29 | End: 2024-03-29 | Stop reason: SDUPTHER

## 2024-03-29 RX ORDER — METHYLPREDNISOLONE ACETATE 40 MG/ML
INJECTION, SUSPENSION INTRA-ARTICULAR; INTRALESIONAL; INTRAMUSCULAR; SOFT TISSUE PRN
Status: DISCONTINUED | OUTPATIENT
Start: 2024-03-29 | End: 2024-03-29 | Stop reason: ALTCHOICE

## 2024-03-29 RX ORDER — FENTANYL CITRATE 50 UG/ML
25 INJECTION, SOLUTION INTRAMUSCULAR; INTRAVENOUS EVERY 5 MIN PRN
Status: DISCONTINUED | OUTPATIENT
Start: 2024-03-29 | End: 2024-03-29 | Stop reason: HOSPADM

## 2024-03-29 RX ORDER — SODIUM CHLORIDE, SODIUM LACTATE, POTASSIUM CHLORIDE, CALCIUM CHLORIDE 600; 310; 30; 20 MG/100ML; MG/100ML; MG/100ML; MG/100ML
INJECTION, SOLUTION INTRAVENOUS CONTINUOUS
Status: DISCONTINUED | OUTPATIENT
Start: 2024-03-29 | End: 2024-03-29 | Stop reason: HOSPADM

## 2024-03-29 RX ORDER — SENNA AND DOCUSATE SODIUM 50; 8.6 MG/1; MG/1
1 TABLET, FILM COATED ORAL 2 TIMES DAILY
Status: DISCONTINUED | OUTPATIENT
Start: 2024-03-29 | End: 2024-03-30 | Stop reason: HOSPADM

## 2024-03-29 RX ORDER — FENTANYL CITRATE 50 UG/ML
INJECTION, SOLUTION INTRAMUSCULAR; INTRAVENOUS PRN
Status: DISCONTINUED | OUTPATIENT
Start: 2024-03-29 | End: 2024-03-29 | Stop reason: SDUPTHER

## 2024-03-29 RX ORDER — M-VIT,TX,IRON,MINS/CALC/FOLIC 27MG-0.4MG
1 TABLET ORAL DAILY
Status: DISCONTINUED | OUTPATIENT
Start: 2024-03-29 | End: 2024-03-30 | Stop reason: HOSPADM

## 2024-03-29 RX ORDER — KETOROLAC TROMETHAMINE 30 MG/ML
INJECTION, SOLUTION INTRAMUSCULAR; INTRAVENOUS PRN
Status: DISCONTINUED | OUTPATIENT
Start: 2024-03-29 | End: 2024-03-29 | Stop reason: SDUPTHER

## 2024-03-29 RX ORDER — SODIUM CHLORIDE 0.9 % (FLUSH) 0.9 %
5-40 SYRINGE (ML) INJECTION EVERY 12 HOURS SCHEDULED
Status: DISCONTINUED | OUTPATIENT
Start: 2024-03-29 | End: 2024-03-29 | Stop reason: HOSPADM

## 2024-03-29 RX ORDER — ONDANSETRON 2 MG/ML
4 INJECTION INTRAMUSCULAR; INTRAVENOUS
Status: DISCONTINUED | OUTPATIENT
Start: 2024-03-29 | End: 2024-03-29

## 2024-03-29 RX ORDER — OXYCODONE HYDROCHLORIDE 5 MG/1
5 TABLET ORAL EVERY 4 HOURS PRN
Status: DISCONTINUED | OUTPATIENT
Start: 2024-03-29 | End: 2024-03-30 | Stop reason: HOSPADM

## 2024-03-29 RX ORDER — CEFAZOLIN SODIUM 2 G/50ML
SOLUTION INTRAVENOUS PRN
Status: DISCONTINUED | OUTPATIENT
Start: 2024-03-29 | End: 2024-03-29 | Stop reason: SDUPTHER

## 2024-03-29 RX ORDER — CYCLOBENZAPRINE HCL 10 MG
10 TABLET ORAL 3 TIMES DAILY PRN
Status: DISCONTINUED | OUTPATIENT
Start: 2024-03-29 | End: 2024-03-30 | Stop reason: HOSPADM

## 2024-03-29 RX ORDER — PHENYLEPHRINE HCL IN 0.9% NACL 1 MG/10 ML
SYRINGE (ML) INTRAVENOUS PRN
Status: DISCONTINUED | OUTPATIENT
Start: 2024-03-29 | End: 2024-03-29 | Stop reason: SDUPTHER

## 2024-03-29 RX ORDER — ACETAMINOPHEN 325 MG/1
650 TABLET ORAL EVERY 6 HOURS
Status: DISCONTINUED | OUTPATIENT
Start: 2024-03-29 | End: 2024-03-30 | Stop reason: HOSPADM

## 2024-03-29 RX ORDER — GLYCOPYRROLATE 0.2 MG/ML
INJECTION INTRAMUSCULAR; INTRAVENOUS PRN
Status: DISCONTINUED | OUTPATIENT
Start: 2024-03-29 | End: 2024-03-29 | Stop reason: SDUPTHER

## 2024-03-29 RX ORDER — OXYBUTYNIN CHLORIDE 10 MG/1
10 TABLET, EXTENDED RELEASE ORAL DAILY
Status: DISCONTINUED | OUTPATIENT
Start: 2024-03-29 | End: 2024-03-30 | Stop reason: HOSPADM

## 2024-03-29 RX ORDER — MIDAZOLAM HYDROCHLORIDE 1 MG/ML
INJECTION INTRAMUSCULAR; INTRAVENOUS PRN
Status: DISCONTINUED | OUTPATIENT
Start: 2024-03-29 | End: 2024-03-29 | Stop reason: SDUPTHER

## 2024-03-29 RX ORDER — SODIUM CHLORIDE 9 MG/ML
INJECTION, SOLUTION INTRAVENOUS PRN
Status: DISCONTINUED | OUTPATIENT
Start: 2024-03-29 | End: 2024-03-29 | Stop reason: HOSPADM

## 2024-03-29 RX ORDER — SODIUM CHLORIDE 0.9 % (FLUSH) 0.9 %
5-40 SYRINGE (ML) INJECTION PRN
Status: DISCONTINUED | OUTPATIENT
Start: 2024-03-29 | End: 2024-03-29 | Stop reason: HOSPADM

## 2024-03-29 RX ORDER — KETAMINE HCL IN NACL, ISO-OSM 100MG/10ML
SYRINGE (ML) INJECTION PRN
Status: DISCONTINUED | OUTPATIENT
Start: 2024-03-29 | End: 2024-03-29 | Stop reason: SDUPTHER

## 2024-03-29 RX ORDER — MAGNESIUM HYDROXIDE 1200 MG/15ML
LIQUID ORAL CONTINUOUS PRN
Status: COMPLETED | OUTPATIENT
Start: 2024-03-29 | End: 2024-03-29

## 2024-03-29 RX ORDER — DEXAMETHASONE SODIUM PHOSPHATE 4 MG/ML
INJECTION, SOLUTION INTRA-ARTICULAR; INTRALESIONAL; INTRAMUSCULAR; INTRAVENOUS; SOFT TISSUE PRN
Status: DISCONTINUED | OUTPATIENT
Start: 2024-03-29 | End: 2024-03-29 | Stop reason: SDUPTHER

## 2024-03-29 RX ORDER — LANOLIN ALCOHOL/MO/W.PET/CERES
400 CREAM (GRAM) TOPICAL DAILY
Status: DISCONTINUED | OUTPATIENT
Start: 2024-03-29 | End: 2024-03-30 | Stop reason: HOSPADM

## 2024-03-29 RX ORDER — PREGABALIN 75 MG/1
150 CAPSULE ORAL DAILY
Status: DISCONTINUED | OUTPATIENT
Start: 2024-03-29 | End: 2024-03-30 | Stop reason: HOSPADM

## 2024-03-29 RX ORDER — NEOSTIGMINE METHYLSULFATE 5 MG/5 ML
SYRINGE (ML) INTRAVENOUS PRN
Status: DISCONTINUED | OUTPATIENT
Start: 2024-03-29 | End: 2024-03-29 | Stop reason: SDUPTHER

## 2024-03-29 RX ORDER — ONDANSETRON 2 MG/ML
INJECTION INTRAMUSCULAR; INTRAVENOUS PRN
Status: DISCONTINUED | OUTPATIENT
Start: 2024-03-29 | End: 2024-03-29 | Stop reason: SDUPTHER

## 2024-03-29 RX ADMIN — SERTRALINE 100 MG: 50 TABLET, FILM COATED ORAL at 20:38

## 2024-03-29 RX ADMIN — ACETAMINOPHEN 325MG 650 MG: 325 TABLET ORAL at 23:44

## 2024-03-29 RX ADMIN — SODIUM CHLORIDE, POTASSIUM CHLORIDE, SODIUM LACTATE AND CALCIUM CHLORIDE: 600; 310; 30; 20 INJECTION, SOLUTION INTRAVENOUS at 10:09

## 2024-03-29 RX ADMIN — OXYBUTYNIN CHLORIDE 10 MG: 10 TABLET, EXTENDED RELEASE ORAL at 15:45

## 2024-03-29 RX ADMIN — ONDANSETRON 4 MG: 2 INJECTION INTRAMUSCULAR; INTRAVENOUS at 07:59

## 2024-03-29 RX ADMIN — CEFAZOLIN SODIUM 2000 MG: 2 SOLUTION INTRAVENOUS at 07:51

## 2024-03-29 RX ADMIN — KETOROLAC TROMETHAMINE 30 MG: 30 INJECTION, SOLUTION INTRAMUSCULAR; INTRAVENOUS at 10:10

## 2024-03-29 RX ADMIN — ONDANSETRON 4 MG: 4 TABLET, ORALLY DISINTEGRATING ORAL at 12:28

## 2024-03-29 RX ADMIN — LIDOCAINE HYDROCHLORIDE 50 MG: 10 INJECTION, SOLUTION EPIDURAL; INFILTRATION; INTRACAUDAL; PERINEURAL at 07:40

## 2024-03-29 RX ADMIN — MIDAZOLAM 2 MG: 1 INJECTION INTRAMUSCULAR; INTRAVENOUS at 07:35

## 2024-03-29 RX ADMIN — FENTANYL CITRATE 50 MCG: 50 INJECTION, SOLUTION INTRAMUSCULAR; INTRAVENOUS at 08:53

## 2024-03-29 RX ADMIN — PROPOFOL 140 MG: 10 INJECTION, EMULSION INTRAVENOUS at 07:40

## 2024-03-29 RX ADMIN — DOCUSATE SODIUM 50 MG AND SENNOSIDES 8.6 MG 1 TABLET: 8.6; 5 TABLET, FILM COATED ORAL at 20:38

## 2024-03-29 RX ADMIN — CYCLOBENZAPRINE 10 MG: 10 TABLET, FILM COATED ORAL at 22:38

## 2024-03-29 RX ADMIN — Medication 25 MG: at 08:53

## 2024-03-29 RX ADMIN — GLYCOPYRROLATE 0.4 MG: 0.2 INJECTION INTRAMUSCULAR; INTRAVENOUS at 10:28

## 2024-03-29 RX ADMIN — Medication 200 MCG: at 07:50

## 2024-03-29 RX ADMIN — DOCUSATE SODIUM 50 MG AND SENNOSIDES 8.6 MG 1 TABLET: 8.6; 5 TABLET, FILM COATED ORAL at 15:44

## 2024-03-29 RX ADMIN — PHENYLEPHRINE HYDROCHLORIDE 50 MCG/MIN: 10 INJECTION INTRAVENOUS at 08:06

## 2024-03-29 RX ADMIN — HYPROMELLOSE: 0 GEL OPHTHALMIC at 18:49

## 2024-03-29 RX ADMIN — PREGABALIN 150 MG: 75 CAPSULE ORAL at 15:44

## 2024-03-29 RX ADMIN — ACETAMINOPHEN 325MG 650 MG: 325 TABLET ORAL at 12:28

## 2024-03-29 RX ADMIN — Medication 5 MG: at 10:28

## 2024-03-29 RX ADMIN — CYCLOBENZAPRINE 10 MG: 10 TABLET, FILM COATED ORAL at 12:28

## 2024-03-29 RX ADMIN — OXYCODONE 10 MG: 5 TABLET ORAL at 13:03

## 2024-03-29 RX ADMIN — Medication 25 MG: at 08:24

## 2024-03-29 RX ADMIN — SODIUM CHLORIDE, POTASSIUM CHLORIDE, SODIUM LACTATE AND CALCIUM CHLORIDE: 600; 310; 30; 20 INJECTION, SOLUTION INTRAVENOUS at 06:35

## 2024-03-29 RX ADMIN — OXYCODONE 10 MG: 5 TABLET ORAL at 20:40

## 2024-03-29 RX ADMIN — ACETAMINOPHEN 325MG 650 MG: 325 TABLET ORAL at 18:49

## 2024-03-29 RX ADMIN — ROCURONIUM BROMIDE 50 MG: 10 INJECTION, SOLUTION INTRAVENOUS at 07:40

## 2024-03-29 RX ADMIN — MAGNESIUM GLUCONATE 500 MG ORAL TABLET 400 MG: 500 TABLET ORAL at 15:45

## 2024-03-29 RX ADMIN — HYDROMORPHONE HYDROCHLORIDE 0.5 MG: 1 INJECTION, SOLUTION INTRAMUSCULAR; INTRAVENOUS; SUBCUTANEOUS at 11:46

## 2024-03-29 RX ADMIN — DEXAMETHASONE SODIUM PHOSPHATE 10 MG: 4 INJECTION, SOLUTION INTRAMUSCULAR; INTRAVENOUS at 07:59

## 2024-03-29 RX ADMIN — FENTANYL CITRATE 50 MCG: 50 INJECTION, SOLUTION INTRAMUSCULAR; INTRAVENOUS at 07:40

## 2024-03-29 RX ADMIN — Medication 100 MCG: at 08:03

## 2024-03-29 RX ADMIN — ROCURONIUM BROMIDE 20 MG: 10 INJECTION, SOLUTION INTRAVENOUS at 08:24

## 2024-03-29 RX ADMIN — Medication 1 TABLET: at 15:44

## 2024-03-29 ASSESSMENT — PAIN DESCRIPTION - FREQUENCY
FREQUENCY: INTERMITTENT

## 2024-03-29 ASSESSMENT — PAIN DESCRIPTION - ORIENTATION
ORIENTATION: LEFT
ORIENTATION: LEFT
ORIENTATION: RIGHT
ORIENTATION: RIGHT
ORIENTATION: LEFT
ORIENTATION: RIGHT

## 2024-03-29 ASSESSMENT — PAIN SCALES - GENERAL
PAINLEVEL_OUTOF10: 5
PAINLEVEL_OUTOF10: 7
PAINLEVEL_OUTOF10: 5
PAINLEVEL_OUTOF10: 9
PAINLEVEL_OUTOF10: 8
PAINLEVEL_OUTOF10: 8
PAINLEVEL_OUTOF10: 6

## 2024-03-29 ASSESSMENT — PAIN DESCRIPTION - LOCATION
LOCATION: EYE
LOCATION: EYE
LOCATION: NECK
LOCATION: EYE
LOCATION: NECK
LOCATION: EYE
LOCATION: NECK

## 2024-03-29 ASSESSMENT — PAIN DESCRIPTION - DESCRIPTORS
DESCRIPTORS: CRAMPING
DESCRIPTORS: SPASM;SORE
DESCRIPTORS: PRESSURE
DESCRIPTORS: CRAMPING
DESCRIPTORS: ACHING;DISCOMFORT;DULL;TIGHTNESS

## 2024-03-29 ASSESSMENT — PAIN - FUNCTIONAL ASSESSMENT
PAIN_FUNCTIONAL_ASSESSMENT: ACTIVITIES ARE NOT PREVENTED
PAIN_FUNCTIONAL_ASSESSMENT: PREVENTS OR INTERFERES SOME ACTIVE ACTIVITIES AND ADLS
PAIN_FUNCTIONAL_ASSESSMENT: PREVENTS OR INTERFERES SOME ACTIVE ACTIVITIES AND ADLS
PAIN_FUNCTIONAL_ASSESSMENT: NONE - DENIES PAIN
PAIN_FUNCTIONAL_ASSESSMENT: PREVENTS OR INTERFERES SOME ACTIVE ACTIVITIES AND ADLS

## 2024-03-29 ASSESSMENT — PAIN DESCRIPTION - ONSET
ONSET: ON-GOING

## 2024-03-29 ASSESSMENT — PAIN DESCRIPTION - PAIN TYPE
TYPE: SURGICAL PAIN
TYPE: INTRACTABLE PAIN
TYPE: SURGICAL PAIN
TYPE: SURGICAL PAIN

## 2024-03-29 NOTE — ANESTHESIA POSTPROCEDURE EVALUATION
Department of Anesthesiology  Postprocedure Note    Patient: Kim Webb  MRN: [id number]  YOB: 1970  Date of evaluation: 3/29/2024    Procedure Summary       Date: 03/29/24 Room / Location: [address]    Anesthesia Start: 0735 Anesthesia Stop: 1045    Procedure: C6-C7 ANTERIOR CERVICAL DISCECTOMY/DECOMPRESSION FUSION Diagnosis:       Herniation of cervical intervertebral disc with radiculopathy      (Herniation of cervical intervertebral disc with radiculopathy [M50.10])    Surgeons: Ephraim Mcknight DO Responsible Provider: Rylan Arceo MD    Anesthesia Type: general ASA Status: 3            Anesthesia Type: No value filed.    Solitario Phase I: Solitario Score: 9    Solitario Phase II:    POST-OP ANESTHESIA NOTE       /75   Pulse 73   Temp 97.2 °F (36.2 °C) (Temporal)   Resp 15   Ht 1.6 m (5' 3\")   Wt 91.6 kg (202 lb)   SpO2 94%   BMI 35.78 kg/m²    Pain Assessment: Face, Legs, Activity, Cry, and Consolability (FLACC)  Pain Level: 8         Anesthesia Post Evaluation    Patient location during evaluation: PACU  Patient participation: complete - patient participated  Level of consciousness: awake  Pain score: 8  Airway patency: patent  Nausea & Vomiting: no vomiting and no nausea  Cardiovascular status: hemodynamically stable  Respiratory status: acceptable  Hydration status: stable  Pain management: adequate        No notable events documented.

## 2024-03-29 NOTE — PROGRESS NOTES
Pt transferred from post op observation.Came on unit via stretcher with neck collar on and JARED draining small amount of serosanguineous fluid.  No visual sign of distress or discomfort noted upon arrival. Pt attached to telemetry monitor , oriented to surrounding.Safety measures initiated. Will continue with current plan of care.

## 2024-03-29 NOTE — ANESTHESIA PRE PROCEDURE
Department of Anesthesiology  Preprocedure Note       Name:  Kim Webb   Age:  53 y.o.  :  1970                                          MRN:  [id number]         Date:  3/29/2024      Surgeon: Surgeon(s):  Ephraim Mcknight DO    Procedure: Procedure(s):  C6-C7 ANTERIOR CERVICAL DISCECTOMY/DECOMPRESSION FUSION   (SUGITA TABLE, SUPINE, C-ARM, GEL DONUT HEADHOLDER, MICROSCOPE, SYNTHES)  *SHORT STAY*    Medications prior to admission:   Prior to Admission medications    Medication Sig Start Date End Date Taking? Authorizing Provider   sertraline (ZOLOFT) 100 MG tablet Take 1 tablet by mouth at bedtime 24   Seth Castillo MD   traMADol (ULTRAM) 50 MG tablet Take 1 tablet by mouth every 8 hours as needed for Pain.    Seth Castillo MD   pregabalin (LYRICA) 150 MG capsule Take 1 capsule by mouth daily.    Seth Castillo MD   meloxicam (MOBIC) 7.5 MG tablet Take 1 tablet by mouth daily 23   Seth Castillo MD   TRULICITY 0.75 MG/0.5ML SOPN ADMINISTER 0.75 MG UNDER THE SKIN EVERY 7 DAYS    Seth Castillo MD   vitamin D3 (CHOLECALCIFEROL) 25 MCG (1000 UT) TABS tablet TAKE 1 TABLET BY MOUTH DAILY 3/13/23   Ruth Howell MD   naproxen (NAPROSYN) 500 MG tablet Take 1 tablet by mouth 2 times daily (with meals) 23   Ruth Howell MD   VITAMIN E/D-ALPHA NATURAL 268 MG (400 UNIT) CAPS TAKE 1 CAPSULE BY MOUTH EVERY DAY 22   Melissa Willis APRN - CNP   vitamin B-12 250 MCG tablet Take 1 tablet by mouth daily 11/9/22 3/21/24  Melissa Willis APRN - CNP   tiZANidine (ZANAFLEX) 2 MG tablet Take 1 tablet by mouth nightly as needed (stiffness) 22   Melissa Willis APRN - CNP   oxybutynin (DITROPAN-XL) 10 mg extended release tablet TAKE 1 TABLET BY MOUTH DAILY 22   Ruth Howell MD   diclofenac sodium (VOLTAREN) 1 % GEL APPLY 2 GRAMS TOPICALLY EVERY 6 HOURS AS NEEDED FOR PAIN AND SWELLING IN JOINT 22   Ruth Howell MD   Melatonin 1 MG

## 2024-03-29 NOTE — INTERVAL H&P NOTE
Pt Name: Kim Webb  MRN: [id number]  YOB: 1970  Date of evaluation: 3/29/2024    I have reviewed the patient's history and physical examination completed in pre-admission testing.    Changes to history or on examination, if any, are as follows:  none    ALBERTA HATHAWAY PA-C  3/29/24  6:44 AM

## 2024-03-29 NOTE — PLAN OF CARE
Problem: Discharge Planning  Goal: Discharge to home or other facility with appropriate resources  Outcome: Progressing  Flowsheets (Taken 3/29/2024 1917)  Discharge to home or other facility with appropriate resources: Identify barriers to discharge with patient and caregiver     Problem: Pain  Goal: Verbalizes/displays adequate comfort level or baseline comfort level  Outcome: Progressing  Flowsheets (Taken 3/29/2024 1917)  Verbalizes/displays adequate comfort level or baseline comfort level: Encourage patient to monitor pain and request assistance     Problem: Safety - Adult  Goal: Free from fall injury  Outcome: Progressing  Flowsheets (Taken 3/29/2024 1917)  Free From Fall Injury: Instruct family/caregiver on patient safety

## 2024-03-29 NOTE — OP NOTE
Operative Note      Patient: Kim Webb  YOB: 1970  MRN: [id number]    Date of Procedure: 3/29/2024    Preoperative diagnosis.  Cervical stenosis with radiculopathy.    Post-Op Diagnosis: Same    Indications for procedure.  Patient with persistent radiculopathy despite conservative measures including physical therapy epidural injection and steroid taper.  Radiculopathy fairly disabling with weakness and numbness that is persistent and progressive despite conservative measures.  Extensive review of the imaging revealed C6-C7 foraminal stenosis left greater than right side concordant with her symptoms.  Decision made to proceed with intracervical discectomy and fusion at C6-C7 and risk and benefits discussed with the patient.       Procedure  Anterior cervical discectomy and osteophytectomy with arthrodesis at C6-C7  Implantation of titanium interbody cage  Anterior fixation with titanium plate and screws  Use of operative microscope  Use of allograft    Surgeon(s):  Ephraim Mcknight DO    Assistant:   * No surgical staff found *    Anesthesia: General    Estimated Blood Loss (mL): less than 50     Complications: None    Specimens:   * No specimens in log *    Implants:  Implant Name Type Inv. Item Serial No.  Lot No. LRB No. Used Action   CAGE SPNL 8 DEG SM [id number] MM CERV TI CONDUIT - [id number]  CAGE SPNL 8 DEG SM [id number] MM CERV TI CONDUIT  [employer]WD [id number] N/A 1 Implanted   GRAFT BNE SUB SM 1ML CRYOPRESERVED VIABLE ANICETO CANC BNE - [id number]  GRAFT BNE SUB SM 1ML CRYOPRESERVED VIABLE ANICETO CANC BNE  [facility] T[facility]-  N/A 1 Implanted   PLATE SPNL L 10 MM TI ANTR CERV 1 LEVEL NONSTERILE CODA - [id number]  PLATE SPNL L 10 MM TI ANTR CERV 1 LEVEL NONSTERILE CODA  [facility] [employer]-WD  N/A 1 Implanted   SCREW SPNL L 14 MM ROOPA 3.5 MM TI ANTR CERV SELF DRILLING VA - [id number]  SCREW SPNL L 14 MM ROOPA 3.5 MM TI ANTR CERV SELF DRILLING VA  [employer]  along with distraction of the disc space.  Operative microscope was then brought into the field.  15 blade is used to make the annulotomy followed by 4 curette to remove the bulk of the disc and arthrodesis the endplates.  3 punch was used to circumferentially resect the disc and overhanging osteophyte.  Drill was used to drill down the posterior elements all the way to the PLL.  2 curette was used to penetrate the PLL followed by 2 punch used to circumferentially resect the posterior longitudinal ligament posterior osteophyte circumferentially.  After complete decompression Floseal and Gelfoam were used to obtain hemostasis and a 7 mm height trial was placed and noted to be appropriate.  Cage was then opened and filled with Vivigen allograft and placed within the displaced slightly countersunk.  Anterior margin was drilled down flat.  Anterior fixation with titanium plate and screws convergent and aimed away from the relative disc space were performed.  After final tightening AP and lateral x-rays showed good positioning of all hardware.  7 flat drain was placed below the platysma and tunneled and secured with a drain stitch.  Gelfoam soaked in Depo-Medrol was placed directly over the plate behind the esophagus to limit the severity of dysphagia.  After hemostasis was obtained with Floseal Gelfoam and bipolar cautery the wound was reapproximated using a combination of interrupted 3-0 Vicryl for the platysma followed by running 4-0 Monocryl with Dermabond for the skin.    Electronically signed by Ephraim Mcknight DO on 3/29/2024 at 5:37 PM

## 2024-03-29 NOTE — PROGRESS NOTES
Neurosurgery Post op Progress Note      POD# 0    s/p C6-C7 ANTERIOR CERVICAL DISCECTOMY/DECOMPRESSION FUSION     SUBJECTIVE:      Patient presents with many months of numbness and shooting pain down her arms- right arm started couple months ago, numbness in middle three fingers , she was on Gabapentin which was changed to Lyrica which improved her symptoms       OBJECTIVE      Physical exam   VITALS:    Vitals:    03/29/24 1115   BP: 106/75   Pulse: 73   Resp: 15   Temp: 97.2 °F (36.2 °C)   SpO2: 94%     INTAKE:    Intake/Output Summary (Last 24 hours) at 3/29/2024 1301  Last data filed at 3/29/2024 1038  Gross per 24 hour   Intake 1050 ml   Output 50 ml   Net 1000 ml            Neurological exam   alert, oriented x3, affect appropriate, no focal neurological deficits, moves all extremities well, and no involuntary movements        Wound   Post op wound:  open to air   Drain: intact     ASSESSMENT AND PLAN    53 y.o. female status post C6-C7 ANTERIOR CERVICAL DISCECTOMY/DECOMPRESSION FUSION  post op day # 0    - Analgesia: Roxicodone 5-10mg q4hrs PRN dilaudid PRN  - Periop Antibiotics: Ancef 2g q8hrs for 3 doses   - Activity: As tolerated  - DVT prophylaxis: SCDs  - Diet: Advance as tolerated  - drain record output   - cervical xrays   - ok for PT and OT  - collar on while out of bed ok to remove while resting in bed eating and drinking        Electronically signed by ИВАН Cowart NP on 3/29/2024 at 1:01 PM

## 2024-03-30 ENCOUNTER — APPOINTMENT (OUTPATIENT)
Dept: GENERAL RADIOLOGY | Age: 54
DRG: 321 | End: 2024-03-30
Attending: NEUROLOGICAL SURGERY
Payer: MEDICAID

## 2024-03-30 VITALS
WEIGHT: 202 LBS | RESPIRATION RATE: 12 BRPM | SYSTOLIC BLOOD PRESSURE: 99 MMHG | OXYGEN SATURATION: 92 % | HEIGHT: 63 IN | DIASTOLIC BLOOD PRESSURE: 56 MMHG | TEMPERATURE: 98.7 F | BODY MASS INDEX: 35.79 KG/M2 | HEART RATE: 68 BPM

## 2024-03-30 PROCEDURE — APPSS30 APP SPLIT SHARED TIME 16-30 MINUTES: Performed by: REGISTERED NURSE

## 2024-03-30 PROCEDURE — 6370000000 HC RX 637 (ALT 250 FOR IP): Performed by: NURSE PRACTITIONER

## 2024-03-30 PROCEDURE — 2580000003 HC RX 258: Performed by: REGISTERED NURSE

## 2024-03-30 PROCEDURE — 2580000003 HC RX 258: Performed by: NURSE PRACTITIONER

## 2024-03-30 PROCEDURE — 72040 X-RAY EXAM NECK SPINE 2-3 VW: CPT

## 2024-03-30 PROCEDURE — 97162 PT EVAL MOD COMPLEX 30 MIN: CPT

## 2024-03-30 PROCEDURE — 6360000002 HC RX W HCPCS: Performed by: NURSE PRACTITIONER

## 2024-03-30 RX ORDER — SODIUM CHLORIDE 9 MG/ML
INJECTION, SOLUTION INTRAVENOUS PRN
Status: DISCONTINUED | OUTPATIENT
Start: 2024-03-30 | End: 2024-03-30 | Stop reason: HOSPADM

## 2024-03-30 RX ORDER — VITAMIN B COMPLEX
1000 TABLET ORAL DAILY
Status: DISCONTINUED | OUTPATIENT
Start: 2024-03-30 | End: 2024-03-30 | Stop reason: HOSPADM

## 2024-03-30 RX ORDER — OXYCODONE HYDROCHLORIDE 5 MG/1
5-10 TABLET ORAL EVERY 6 HOURS PRN
Qty: 56 TABLET | Refills: 0 | Status: SHIPPED | OUTPATIENT
Start: 2024-03-30 | End: 2024-04-06

## 2024-03-30 RX ORDER — SODIUM CHLORIDE 0.9 % (FLUSH) 0.9 %
5-40 SYRINGE (ML) INJECTION EVERY 12 HOURS SCHEDULED
Status: DISCONTINUED | OUTPATIENT
Start: 2024-03-30 | End: 2024-03-30 | Stop reason: HOSPADM

## 2024-03-30 RX ORDER — SODIUM CHLORIDE 0.9 % (FLUSH) 0.9 %
5-40 SYRINGE (ML) INJECTION PRN
Status: DISCONTINUED | OUTPATIENT
Start: 2024-03-30 | End: 2024-03-30 | Stop reason: HOSPADM

## 2024-03-30 RX ORDER — SODIUM CHLORIDE 9 MG/ML
INJECTION, SOLUTION INTRAVENOUS CONTINUOUS
Status: DISCONTINUED | OUTPATIENT
Start: 2024-03-30 | End: 2024-03-30 | Stop reason: HOSPADM

## 2024-03-30 RX ORDER — VITAMIN E 268 MG
400 CAPSULE ORAL DAILY
Status: DISCONTINUED | OUTPATIENT
Start: 2024-03-30 | End: 2024-03-30 | Stop reason: HOSPADM

## 2024-03-30 RX ORDER — BISACODYL 10 MG
10 SUPPOSITORY, RECTAL RECTAL DAILY PRN
Status: DISCONTINUED | OUTPATIENT
Start: 2024-03-30 | End: 2024-03-30 | Stop reason: HOSPADM

## 2024-03-30 RX ORDER — 0.9 % SODIUM CHLORIDE 0.9 %
500 INTRAVENOUS SOLUTION INTRAVENOUS ONCE
Status: COMPLETED | OUTPATIENT
Start: 2024-03-30 | End: 2024-03-30

## 2024-03-30 RX ORDER — CYCLOBENZAPRINE HCL 10 MG
10 TABLET ORAL 3 TIMES DAILY PRN
Qty: 30 TABLET | Refills: 0 | Status: SHIPPED | OUTPATIENT
Start: 2024-03-30 | End: 2024-04-09

## 2024-03-30 RX ADMIN — Medication 1 TABLET: at 08:11

## 2024-03-30 RX ADMIN — OXYBUTYNIN CHLORIDE 10 MG: 10 TABLET, EXTENDED RELEASE ORAL at 08:11

## 2024-03-30 RX ADMIN — ACETAMINOPHEN 325MG 650 MG: 325 TABLET ORAL at 11:17

## 2024-03-30 RX ADMIN — OXYCODONE 10 MG: 5 TABLET ORAL at 06:04

## 2024-03-30 RX ADMIN — CYCLOBENZAPRINE 10 MG: 10 TABLET, FILM COATED ORAL at 06:52

## 2024-03-30 RX ADMIN — MAGNESIUM GLUCONATE 500 MG ORAL TABLET 400 MG: 500 TABLET ORAL at 08:11

## 2024-03-30 RX ADMIN — DOCUSATE SODIUM 50 MG AND SENNOSIDES 8.6 MG 1 TABLET: 8.6; 5 TABLET, FILM COATED ORAL at 08:11

## 2024-03-30 RX ADMIN — ACETAMINOPHEN 325MG 650 MG: 325 TABLET ORAL at 05:57

## 2024-03-30 RX ADMIN — Medication 400 UNITS: at 08:11

## 2024-03-30 RX ADMIN — Medication 2000 MG: at 08:11

## 2024-03-30 RX ADMIN — PREGABALIN 150 MG: 75 CAPSULE ORAL at 08:11

## 2024-03-30 RX ADMIN — ACETAMINOPHEN 325MG 650 MG: 325 TABLET ORAL at 16:27

## 2024-03-30 RX ADMIN — VITAMIN D, TAB 1000IU (100/BT) 1000 UNITS: 25 TAB at 08:11

## 2024-03-30 RX ADMIN — SODIUM CHLORIDE, PRESERVATIVE FREE 10 ML: 5 INJECTION INTRAVENOUS at 08:12

## 2024-03-30 RX ADMIN — SODIUM CHLORIDE: 9 INJECTION, SOLUTION INTRAVENOUS at 08:13

## 2024-03-30 RX ADMIN — Medication 2000 MG: at 15:39

## 2024-03-30 RX ADMIN — SODIUM CHLORIDE 500 ML: 9 INJECTION, SOLUTION INTRAVENOUS at 00:15

## 2024-03-30 ASSESSMENT — PAIN DESCRIPTION - LOCATION
LOCATION: NECK

## 2024-03-30 ASSESSMENT — PAIN DESCRIPTION - ONSET: ONSET: ON-GOING

## 2024-03-30 ASSESSMENT — PAIN DESCRIPTION - ORIENTATION
ORIENTATION: LEFT
ORIENTATION: RIGHT
ORIENTATION: RIGHT
ORIENTATION: LEFT

## 2024-03-30 ASSESSMENT — PAIN DESCRIPTION - DESCRIPTORS
DESCRIPTORS: TIGHTNESS;ACHING
DESCRIPTORS: SORE
DESCRIPTORS: SORE

## 2024-03-30 ASSESSMENT — PAIN SCALES - GENERAL
PAINLEVEL_OUTOF10: 7

## 2024-03-30 ASSESSMENT — PAIN DESCRIPTION - PAIN TYPE: TYPE: SURGICAL PAIN

## 2024-03-30 ASSESSMENT — PAIN - FUNCTIONAL ASSESSMENT
PAIN_FUNCTIONAL_ASSESSMENT: PREVENTS OR INTERFERES SOME ACTIVE ACTIVITIES AND ADLS
PAIN_FUNCTIONAL_ASSESSMENT: PREVENTS OR INTERFERES SOME ACTIVE ACTIVITIES AND ADLS

## 2024-03-30 ASSESSMENT — PAIN DESCRIPTION - FREQUENCY: FREQUENCY: INTERMITTENT

## 2024-03-30 NOTE — PROGRESS NOTES
Neurosurgery MAKSIM/Resident    Daily Progress Note   No chief complaint on file.    3/30/2024  10:30 AM    Chart reviewed.  No acute events overnight.  No new complaints. Reports numbness improved post op. Pain controlled. Soreness while swallowing. No dysphagia. Tolerating oral diet. Ambulating in room.     Vitals:    03/29/24 2348 03/30/24 0557 03/30/24 0634 03/30/24 0744   BP: (!) 87/64 94/66  95/63   Pulse: 72 86  63   Resp: 14 21 18 16   Temp: 97.7 °F (36.5 °C)   98.3 °F (36.8 °C)   TempSrc: Oral   Oral   SpO2: 96% 95%  92%   Weight:       Height:             PE:   AOx3   Motor   L deltoid 5/5; R deltoid 5/5  L biceps 5/5; R biceps 5/5  L triceps 5/5; R triceps 5/5  L wrist extension 5/5; R wrist extension 5/5  L intrinsics 5/5; R intrinsics 5/5      L iliopsoas 5/5 , R iliopsoas 5/5  L quadriceps 5/5; R quadriceps 5/5  L Dorsiflexion 5/5; R dorsiflexion 5/5  L Plantarflexion 5/5; R plantarflexion 5/5  L EHL 5/5; R EHL 5/5    Sensation intact    Drain output   JARED drain 0 ml/12h  Incision anterior cervical site, open to air, c/d       Lab Results   Component Value Date    WBC 5.9 03/21/2024    HGB 14.1 03/21/2024    HCT 42.6 03/21/2024     03/21/2024    CHOL 188 02/13/2021    TRIG 127 02/13/2021    HDL 46 02/13/2021    ALT 22 02/17/2022    AST 17 02/17/2022     03/21/2024    K 4.3 03/21/2024     03/21/2024    CREATININE 1.1 (H) 03/21/2024    BUN 9 03/21/2024    CO2 24 03/21/2024    TSH 1.43 02/13/2021    LABA1C 5.9 11/09/2022       Radiology   XR CERVICAL SPINE (2-3 VIEWS)    Result Date: 3/30/2024  EXAMINATION: 2 XRAY VIEWS OF THE CERVICAL SPINE 3/30/2024 10:25 am COMPARISON: None. HISTORY: ORDERING SYSTEM PROVIDED HISTORY: f/u post op TECHNOLOGIST PROVIDED HISTORY: Upright AP and lateral f/u post op FINDINGS: There is anterior fixation at C6-7 without evidence for complication.  The vertebral body heights are maintained.  The remaining disc spaces are maintained.  The facet joints are aligned.

## 2024-03-30 NOTE — CARE COORDINATION
Case Management Assessment  Initial Evaluation    Date/Time of Evaluation: 3/30/2024 11:36 AM  Assessment Completed by: Lucia Barrett RN    If patient is discharged prior to next notation, then this note serves as note for discharge by case management.    Patient Name: Kim Webb                   YOB: 1970  Diagnosis: Herniation of cervical intervertebral disc with radiculopathy [M50.10]  Cervical disc disease [M50.90]                   Date / Time: 3/29/2024  5:49 AM    Patient Admission Status: Inpatient   Readmission Risk (Low < 19, Mod (19-27), High > 27): Readmission Risk Score: 4.4    Current PCP: Meenu Mueller, DO  PCP verified by CM? (P) Yes (Dr. Meenu Mueller)    Chart Reviewed: Yes      History Provided by: (P) Patient  Patient Orientation: (P) Alert and Oriented, Person, Place, Situation    Patient Cognition: (P) Alert    Hospitalization in the last 30 days (Readmission):  No    If yes, Readmission Assessment in CM Navigator will be completed.    Advance Directives:      Code Status: Full Code   Patient's Primary Decision Maker is: (P) Patient Declined (Legal Next of Kin Remains as Decision Maker)      Discharge Planning:    Patient lives with: (P) Children Type of Home: (P) House  Primary Care Giver: (P) Self  Patient Support Systems include: (P) Children, Family Members   Current Financial resources: (P) Medicaid  Current community resources: (P) None  Current services prior to admission: (P) None            Current DME:              Type of Home Care services:  (P) None    ADLS  Prior functional level: (P) Independent in ADLs/IADLs  Current functional level: (P) Independent in ADLs/IADLs    PT AM-PAC:   /24  OT AM-PAC:   /24    Family can provide assistance at DC: (P) Yes  Would you like Case Management to discuss the discharge plan with any other family members/significant others, and if so, who? (P) No  Plans to Return to Present Housing: (P) Yes  Other Identified

## 2024-03-30 NOTE — PROGRESS NOTES
Pt discharge to home at this time, IV removed, pt voiced understanding of discharge instructions and medications

## 2024-03-30 NOTE — DISCHARGE INSTRUCTIONS
Anterior Cervical Discectomy and Fusion (ACDF) Surgery Discharge Instructions     Thank you for choosing [facility] for your surgical needs. The following instructions will help to ensure your comfort and that you are well prepared after your surgery.       Post-Operative Visit:   The office is located at:    [facility]    [facility] [address], [address], [place] [address]    [phone]     Please also call your primary care physician to schedule an appointment for further evaluation and care.     Nutrition:   You may resume your regular diet.   It is normal to have a sore throat and some difficulty swallowing solid foods. You are able to resume your regular diet, however you may be more comfortable at first eating softer foods such as mashed potatoes, ice cream, jello or soup.   Be sure to eat a well-balanced diet. Protein promotes wound healing.   Pain medication and decreased activity can cause constipation. Drink 8-10 glasses of water a day, eat fresh fruits and vegetables, and add prunes, raisins and bran cereals to your diet if you do become constipated. A stool softener taken 1-2 times a day is helpful. Dulcolax suppositories or Fleets enemas are also available without a prescription. Call our office if the problem continues.     Activity and Exercise:   No driving until you are seen in the office.   Avoid riding in a car for the first two weeks until you come to the office for your scheduled follow-up.   Start taking short, frequent walks in the beginning. [place], more frequent walks throughout the day are more beneficial than one long walk each day. You may gradually increase the distance; as tolerated. Your brace will help give support to your muscles while you walk.   If your pain increases, you may be walking too much or too far. Try backing off for a day or two and then resume slowly.   No  check your incisions twice daily.   Fevers greater than 101.5 degrees   Flu-like symptoms, chills, shakes, chest pain, shortness of breath, nausea, vomiting, diarrhea   New or increased pain, numbness or tingling in the arms or legs, as well as new or increased balance or coordination issues.   New difficulty with urinating or holding your bladder or your bowels     *If you are unable to contact someone at the office and your symptoms persist or increase, call 911 or go to the emergency department.

## 2024-03-30 NOTE — PLAN OF CARE
Problem: Discharge Planning  Goal: Discharge to home or other facility with appropriate resources  3/30/2024 0553 by Kelly Roche RN  Outcome: Progressing  3/29/2024 1917 by Luz Ortiz, RN  Outcome: Progressing  Flowsheets (Taken 3/29/2024 1917)  Discharge to home or other facility with appropriate resources: Identify barriers to discharge with patient and caregiver     Problem: Pain  Goal: Verbalizes/displays adequate comfort level or baseline comfort level  3/30/2024 0553 by Kelly Roche RN  Outcome: Progressing  3/29/2024 1917 by Luz Ortiz, RN  Outcome: Progressing  Flowsheets (Taken 3/29/2024 1917)  Verbalizes/displays adequate comfort level or baseline comfort level: Encourage patient to monitor pain and request assistance     Problem: Safety - Adult  Goal: Free from fall injury  3/30/2024 0553 by Kelly Roche RN  Outcome: Progressing  Flowsheets (Taken 3/29/2024 2000)  Free From Fall Injury: Instruct family/caregiver on patient safety  3/29/2024 1917 by Luz Ortiz, RN  Outcome: Progressing  Flowsheets (Taken 3/29/2024 1917)  Free From Fall Injury: Instruct family/caregiver on patient safety

## 2024-03-30 NOTE — PLAN OF CARE
Problem: Discharge Planning  Goal: Discharge to home or other facility with appropriate resources  3/30/2024 1637 by Zully Salas RN  Outcome: Adequate for Discharge  3/30/2024 0553 by Kelly Roche RN  Outcome: Progressing     Problem: Pain  Goal: Verbalizes/displays adequate comfort level or baseline comfort level  3/30/2024 1637 by Zully Salas RN  Outcome: Adequate for Discharge  3/30/2024 0553 by Kelly Roche RN  Outcome: Progressing     Problem: Safety - Adult  Goal: Free from fall injury  3/30/2024 1637 by Zully Salas RN  Outcome: Adequate for Discharge  3/30/2024 0553 by Kelly Roche RN  Outcome: Progressing  Flowsheets (Taken 3/29/2024 2000)  Free From Fall Injury: Instruct family/caregiver on patient safety

## 2024-03-30 NOTE — PROGRESS NOTES
Physical Therapy  Facility/Department: [address] STEPDOWN  Physical Therapy Initial Assessment    Name: Kim Webb  : 1970  MRN: [id number]  Date of Service: 3/30/2024    Discharge Recommendations:  Home with assist PRN          Patient Diagnosis(es): There were no encounter diagnoses.  Past Medical History:  has a past medical history of Abnormal brain MRI, Anxiety, Arthritis, Chronic back pain, Depression, Environmental allergies, Fatty liver, Fibromyalgia, Headache(784.0), Herniation of cervical intervertebral disc with radiculopathy, High cholesterol, Palpitations, Pre-diabetes, Under care of team, Under care of team, Under care of team, and Wears glasses.  Past Surgical History:  has a past surgical history that includes Appendectomy; Tubal ligation; Lumbar spine surgery; Cystoscopy (N/A, 2019); Colonoscopy;  section; and cervical fusion (2024).    Assessment   Assessment: Pt currently performing all functional mobility independently; will discharge PT.  Treatment Diagnosis: neck pain  Therapy Prognosis: Good  Decision Making: Medium Complexity  No Skilled PT: Safe to return home  Requires PT Follow-Up: No  Activity Tolerance  Activity Tolerance: Patient tolerated treatment well     Plan   Physical Therapy Plan  General Plan: Discharge with evaluation only  Safety Devices  Type of Devices: All fall risk precautions in place     Restrictions  Restrictions/Precautions  Restrictions/Precautions: Surgical Protocols, General Precautions  Required Braces or Orthoses?: Yes  Required Braces or Orthoses  Cervical: c-collar     Subjective   General  Chart Reviewed: Yes  Patient assessed for rehabilitation services?: Yes  Response To Previous Treatment: Not applicable  Family / Caregiver Present: No  Follows Commands: Within Functional Limits  Subjective  Subjective: Agrees to PT eval; has been up to bathroom with walker         Social/Functional History  Social/Functional History  Lives

## 2024-03-31 NOTE — DISCHARGE SUMMARY
Department of Neurosurgery                                            Discharge Summary       PATIENT NAME: Kim Webb  YOB: 1970  MEDICAL RECORD NO. [id number]  DATE: 3/31/2024  PRIMARY CARE PHYSICIAN: Meenu Mueller DO  DISCHARGE DATE:  3/30/2024  5:54 PM  DISCHARGE DIAGNOSIS:   Patient Active Problem List   Diagnosis Code    Cervical spinal stenosis M48.02    Isolated memory impairment R41.3    Fibromyalgia M79.7    Depression F32.A    Headache R51.9    Anxiety F41.9    Vertigo R42    Ataxia R27.0    Chronic fatigue R53.82    Chronic nonintractable headache R51.9, G89.29    Lower extremity weakness R29.898    Demyelinating disease of central nervous system (HCC) G37.9    Family history of diabetes mellitus Z83.3    Anemia D64.9    Vitamin D deficiency E55.9    Chronic migraine without aura, intractable, without status migrainosus G43.719    Family history of breast cancer Z80.3    OAB (overactive bladder) N32.81    Other microscopic hematuria R31.29    Vitamin B 12 deficiency E53.8    Hyperlipidemia with target LDL less than 100 E78.5    Chronic midline low back pain without sciatica M54.50, G89.29    DDD (degenerative disc disease), lumbar M51.36    Recurrent major depressive disorder, in full remission (HCC) F33.42    Fatty liver K76.0    Adjustment disorder with anxious mood F43.22    Acute allergic conjunctivitis of both eyes H10.13    Retrolisthesis M43.10    Cervical radiculopathy M54.12    Cervical spondylosis with radiculopathy M47.22    Cervical disc disease M50.90     DISPOSITION: Home    PROCEDURES:    Anterior cervical discectomy and osteophytectomy with arthrodesis at C6-C7  Implantation of titanium interbody cage  Anterior fixation with titanium plate and screws  Use of operative microscope  Use of allograft    HOSPITAL COURSE     Kim Webb originally presented to the hospital on 3/29/2024  5:49 AM with cervical stenosis with radiculopathy. She was

## 2024-04-01 NOTE — PROGRESS NOTES
CLINICAL PHARMACY NOTE: MEDS TO BEDS    Total # of Prescriptions Filled: 2   The following medications were delivered to the patient:  Cyclobenzaprine 10mg  Oxycodone 5mg    Additional Documentation: delivered to patient in room 142 3/30 at 1pm. No co-pay. Anabella delivered.

## 2024-05-22 ENCOUNTER — OFFICE VISIT (OUTPATIENT)
Dept: NEUROSURGERY | Age: 54
End: 2024-05-22
Payer: MEDICAID

## 2024-05-22 VITALS
OXYGEN SATURATION: 96 % | DIASTOLIC BLOOD PRESSURE: 67 MMHG | HEIGHT: 63 IN | HEART RATE: 71 BPM | BODY MASS INDEX: 35.79 KG/M2 | SYSTOLIC BLOOD PRESSURE: 102 MMHG | WEIGHT: 202 LBS

## 2024-05-22 DIAGNOSIS — M50.10 HERNIATION OF CERVICAL INTERVERTEBRAL DISC WITH RADICULOPATHY: Primary | ICD-10-CM

## 2024-05-22 DIAGNOSIS — M51.16 LUMBAR DISC DISEASE WITH RADICULOPATHY: ICD-10-CM

## 2024-05-22 PROCEDURE — 99213 OFFICE O/P EST LOW 20 MIN: CPT | Performed by: NEUROLOGICAL SURGERY

## 2024-05-22 NOTE — PROGRESS NOTES
[facility] NEUROSURGERY [facility]  [address]  [address]  [address], [address]  [facility] [address]  Dept: [phone]    Patient:  Kim Webb  YOB: 1970  Date: 5/22/24    The patient is a 53 y.o. female who presents today for consult of the following problems:     Chief Complaint   Patient presents with    Other    Post-Op Check     Post Op. Slight pain in arms, legs, and buttocks. Brain Fog.             HPI:     Kim Webb is a 53 y.o. female on whom neurosurgical consultation was requested by Meenu Mueller DO for management of almost complete resolution of left C7 radiculopathy. Infrequent L radicular pain for couple seconds daily far better than prior. No significant neck pain. Slowly improving activity and minimal neck pain with mostly some stiffness.     Only had minimal tingling in the LLE down posterior leg to close to ankle but not into foot anymore. Pain is approx 6/10 and intermittent. No RLE pain. Pain mgmt gives her tramadol for the leg symptoms & pregabalin. Does use a heating pad and intermittently leg lifts in chair position involving back and leg stretches.       History:     Past Medical History:   Diagnosis Date    Abnormal brain MRI     [facility] (states provider was not certain it was MS); neuro note 2019 does not state MS    Anxiety     unison ,on meds in past     Arthritis     Chronic back pain     Depression     post partum and childhood    Environmental allergies     Fatty liver 03/10/2021    Fibromyalgia     Headache(784.0)     Herniation of cervical intervertebral disc with radiculopathy     High cholesterol     Palpitations     Pre-diabetes     Under care of team 03/21/2024    PCP: Meenu Mueller DO, [place], last visit 11/2023    Under care of team 03/21/2024    Neurology: Ephraim Mcknight DO, [facility], las visit 1/31/2024    Under care of team     pain

## 2024-07-22 ENCOUNTER — TELEPHONE (OUTPATIENT)
Dept: NEUROSURGERY | Age: 54
End: 2024-07-22

## 2024-07-22 DIAGNOSIS — M51.16 LUMBAR DISC DISEASE WITH RADICULOPATHY: Primary | ICD-10-CM

## 2024-07-22 DIAGNOSIS — M50.10 HERNIATION OF CERVICAL INTERVERTEBRAL DISC WITH RADICULOPATHY: ICD-10-CM

## 2024-07-22 RX ORDER — TRAMADOL HYDROCHLORIDE 50 MG/1
50 TABLET ORAL EVERY 6 HOURS PRN
Qty: 28 TABLET | Refills: 0 | Status: SHIPPED | OUTPATIENT
Start: 2024-07-22 | End: 2024-07-29

## 2024-07-22 NOTE — TELEPHONE ENCOUNTER
Patient is outside of post-operative window for pain medication. Does look like she follows with pain management, who most recently prescribed her tramadol. Would recommend reaching out to her pain specialist for recommendations.

## 2024-07-22 NOTE — TELEPHONE ENCOUNTER
I called the patient to let her know we had to move her appointment from 8/5/24 to 9/9/24 and she stated that she is no longer taking the Tramadol  and her pain has increased in her left lower shoulder. Patient wanted to know since she had to move the appointment out further if she could get something for pain until her appointment.

## 2024-09-09 ENCOUNTER — OFFICE VISIT (OUTPATIENT)
Dept: NEUROSURGERY | Age: 54
End: 2024-09-09
Payer: MEDICAID

## 2024-09-09 ENCOUNTER — HOSPITAL ENCOUNTER (OUTPATIENT)
Age: 54
Discharge: HOME | End: 2024-09-11
Payer: MEDICAID

## 2024-09-09 ENCOUNTER — HOSPITAL ENCOUNTER (OUTPATIENT)
Dept: GENERAL RADIOLOGY | Age: 54
Discharge: HOME | End: 2024-09-11
Payer: MEDICAID

## 2024-09-09 VITALS
HEIGHT: 63 IN | DIASTOLIC BLOOD PRESSURE: 65 MMHG | HEART RATE: 76 BPM | WEIGHT: 203 LBS | SYSTOLIC BLOOD PRESSURE: 93 MMHG | BODY MASS INDEX: 35.97 KG/M2

## 2024-09-09 DIAGNOSIS — M50.10 HERNIATION OF CERVICAL INTERVERTEBRAL DISC WITH RADICULOPATHY: ICD-10-CM

## 2024-09-09 DIAGNOSIS — M51.16 LUMBAR DISC DISEASE WITH RADICULOPATHY: Primary | ICD-10-CM

## 2024-09-09 PROCEDURE — 99213 OFFICE O/P EST LOW 20 MIN: CPT | Performed by: NEUROLOGICAL SURGERY

## 2024-09-09 PROCEDURE — 72040 X-RAY EXAM NECK SPINE 2-3 VW: CPT

## 2024-09-09 RX ORDER — TRAMADOL HYDROCHLORIDE 50 MG/1
50 TABLET ORAL EVERY 6 HOURS PRN
COMMUNITY
Start: 2024-08-07

## (undated) DEVICE — GARMENT,MEDLINE,DVT,INT,CALF,MED, GEN2: Brand: MEDLINE

## (undated) DEVICE — PAD,NON-ADHERENT,3X8,STERILE,LF,1/PK: Brand: MEDLINE

## (undated) DEVICE — MARKER,SKIN,WI/RULER AND LABELS: Brand: MEDLINE

## (undated) DEVICE — LIQUIBAND RAPID ADHESIVE 36/CS 0.8ML: Brand: MEDLINE

## (undated) DEVICE — DRAPE,REIN 53X77,STERILE: Brand: MEDLINE

## (undated) DEVICE — GLOVE SURG SZ 75 CRM LTX FREE POLYISOPRENE POLYMER BEAD ANTI

## (undated) DEVICE — ADAPTER URO SCP UROLOK LL

## (undated) DEVICE — STERILE POLYISOPRENE POWDER-FREE SURGICAL GLOVES WITH EMOLLIENT COATING: Brand: PROTEXIS

## (undated) DEVICE — SYRINGE, LUER LOCK, 10ML: Brand: MEDLINE

## (undated) DEVICE — SUTURE NONABSORBABLE MONOFILAMENT 3-0 PS-1 18 IN BLK ETHILON 1663H

## (undated) DEVICE — SUTURE MCRYL SZ 4-0 L18IN ABSRB UD L16MM PC-3 3/8 CIR PRIM Y845G

## (undated) DEVICE — 3.0MM PRECISION NEURO (MATCH HEAD)

## (undated) DEVICE — ABS MED DISTRACTION PIN , 12MM POUCH: Brand: ABS MED DISTRACTION PIN

## (undated) DEVICE — SHEET,DRAPE,70X100,STERILE: Brand: MEDLINE

## (undated) DEVICE — Z DISC USE 2220190 SUTURE VCRL SZ 3-0 L27IN ABSRB UD L26MM SH 1/2 CIR J416H

## (undated) DEVICE — AGENT HEMOSTATIC SURGIFLOW MATRIX KIT W/THROMBIN

## (undated) DEVICE — COVER,MAYO STAND,STERILE: Brand: MEDLINE

## (undated) DEVICE — BLADE ES ELASTOMERIC COAT INSUL DURABLE BEND UPTO 90DEG

## (undated) DEVICE — GLOVE ORANGE PI 7   MSG9070

## (undated) DEVICE — STVZ LUMBAR SPINE PACK: Brand: MEDLINE INDUSTRIES, INC.

## (undated) DEVICE — 1LYRTR 16FR10ML100%SIL UMS SNP: Brand: MEDLINE INDUSTRIES, INC.

## (undated) DEVICE — COVER,MAYO STAND,XL,STERILE: Brand: MEDLINE

## (undated) DEVICE — STRAP,POSITIONING,KNEE/BODY,FOAM,4X60": Brand: MEDLINE

## (undated) DEVICE — 450 ML BOTTLE OF 0.05% CHLORHEXIDINE GLUCONATE IN 99.95% STERILE WATER FOR IRRIGATION, USP AND APPLICATOR.: Brand: IRRISEPT ANTIMICROBIAL WOUND LAVAGE

## (undated) DEVICE — STRAP ARMBRD W1.5XL32IN FOAM STR YET SFT W/ HK AND LOOP

## (undated) DEVICE — DRESSING BORDERED ADH GZ UNIV GEN USE 5IN 4IN AND 2 1/2IN

## (undated) DEVICE — TUBING, SUCTION, 9/32" X 20', STRAIGHT: Brand: MEDLINE INDUSTRIES, INC.

## (undated) DEVICE — GAUZE,SPONGE,FLUFF,6"X6.75",STRL,5/TRAY: Brand: MEDLINE

## (undated) DEVICE — KIT DRN FLAT W/ 100CC EVAC 7MM FULL PERF

## (undated) DEVICE — SYRINGE,CONTROL,LL,FINGER,GRIP: Brand: MEDLINE INDUSTRIES, INC.

## (undated) DEVICE — NEEDLE, QUINCKE, 18GX3.5": Brand: MEDLINE

## (undated) DEVICE — DRAPE,THYROID,SOFT,STERILE: Brand: MEDLINE

## (undated) DEVICE — ELECTRODE PT RET AD L9FT HI MOIST COND ADH HYDRGEL CORDED

## (undated) DEVICE — APPLICATOR MEDICATED 10.5 CC SOLUTION HI LT ORNG CHLORAPREP

## (undated) DEVICE — SPONGE,PEANUT,XRAY,ST,SM,3/8",5/CARD: Brand: MEDLINE INDUSTRIES, INC.

## (undated) DEVICE — DRAPE MICSCP W117XL305CM DIA65MM LENS W VARI LENS2 FOR LEICA

## (undated) DEVICE — PACK PROCEDURE SURG CYSTO SVMMC LF

## (undated) DEVICE — C-ARM: Brand: UNBRANDED

## (undated) DEVICE — SUTURE VCRL SZ 3-0 L18IN ABSRB UD L26MM SH 1/2 CIR J864D